# Patient Record
Sex: FEMALE | Race: WHITE | NOT HISPANIC OR LATINO | Employment: FULL TIME | URBAN - METROPOLITAN AREA
[De-identification: names, ages, dates, MRNs, and addresses within clinical notes are randomized per-mention and may not be internally consistent; named-entity substitution may affect disease eponyms.]

---

## 2023-03-08 LAB
ALANINE AMINOTRANSFERASE (SGPT) (U/L) IN SER/PLAS: 8 U/L (ref 7–45)
ALBUMIN (G/DL) IN SER/PLAS: 4.3 G/DL (ref 3.4–5)
ALKALINE PHOSPHATASE (U/L) IN SER/PLAS: 42 U/L (ref 33–110)
ANION GAP IN SER/PLAS: 10 MMOL/L (ref 10–20)
ASPARTATE AMINOTRANSFERASE (SGOT) (U/L) IN SER/PLAS: 13 U/L (ref 9–39)
BASOPHILS (10*3/UL) IN BLOOD BY AUTOMATED COUNT: 0.07 X10E9/L (ref 0–0.1)
BASOPHILS/100 LEUKOCYTES IN BLOOD BY AUTOMATED COUNT: 0.9 % (ref 0–2)
BILIRUBIN TOTAL (MG/DL) IN SER/PLAS: 0.3 MG/DL (ref 0–1.2)
CALCIUM (MG/DL) IN SER/PLAS: 9.4 MG/DL (ref 8.6–10.3)
CARBON DIOXIDE, TOTAL (MMOL/L) IN SER/PLAS: 29 MMOL/L (ref 21–32)
CHLORIDE (MMOL/L) IN SER/PLAS: 107 MMOL/L (ref 98–107)
CREATININE (MG/DL) IN SER/PLAS: 0.68 MG/DL (ref 0.5–1.05)
EOSINOPHILS (10*3/UL) IN BLOOD BY AUTOMATED COUNT: 0.34 X10E9/L (ref 0–0.7)
EOSINOPHILS/100 LEUKOCYTES IN BLOOD BY AUTOMATED COUNT: 4.4 % (ref 0–6)
ERYTHROCYTE DISTRIBUTION WIDTH (RATIO) BY AUTOMATED COUNT: 14.4 % (ref 11.5–14.5)
ERYTHROCYTE MEAN CORPUSCULAR HEMOGLOBIN CONCENTRATION (G/DL) BY AUTOMATED: 32.3 G/DL (ref 32–36)
ERYTHROCYTE MEAN CORPUSCULAR VOLUME (FL) BY AUTOMATED COUNT: 97 FL (ref 80–100)
ERYTHROCYTES (10*6/UL) IN BLOOD BY AUTOMATED COUNT: 4.24 X10E12/L (ref 4–5.2)
GFR FEMALE: >90 ML/MIN/1.73M2
GLUCOSE (MG/DL) IN SER/PLAS: 83 MG/DL (ref 74–99)
HEMATOCRIT (%) IN BLOOD BY AUTOMATED COUNT: 41.2 % (ref 36–46)
HEMOGLOBIN (G/DL) IN BLOOD: 13.3 G/DL (ref 12–16)
IMMATURE GRANULOCYTES/100 LEUKOCYTES IN BLOOD BY AUTOMATED COUNT: 0.3 % (ref 0–0.9)
LACTATE DEHYDROGENASE (U/L) IN SER/PLAS BY LAC->PYR RXN: 169 U/L (ref 84–246)
LEUKOCYTES (10*3/UL) IN BLOOD BY AUTOMATED COUNT: 7.7 X10E9/L (ref 4.4–11.3)
LYMPHOCYTES (10*3/UL) IN BLOOD BY AUTOMATED COUNT: 1.81 X10E9/L (ref 1.2–4.8)
LYMPHOCYTES/100 LEUKOCYTES IN BLOOD BY AUTOMATED COUNT: 23.4 % (ref 13–44)
MONOCYTES (10*3/UL) IN BLOOD BY AUTOMATED COUNT: 0.63 X10E9/L (ref 0.1–1)
MONOCYTES/100 LEUKOCYTES IN BLOOD BY AUTOMATED COUNT: 8.2 % (ref 2–10)
NEUTROPHILS (10*3/UL) IN BLOOD BY AUTOMATED COUNT: 4.86 X10E9/L (ref 1.2–7.7)
NEUTROPHILS/100 LEUKOCYTES IN BLOOD BY AUTOMATED COUNT: 62.8 % (ref 40–80)
PLATELETS (10*3/UL) IN BLOOD AUTOMATED COUNT: 222 X10E9/L (ref 150–450)
POTASSIUM (MMOL/L) IN SER/PLAS: 4.6 MMOL/L (ref 3.5–5.3)
PROTEIN TOTAL: 6.6 G/DL (ref 6.4–8.2)
SODIUM (MMOL/L) IN SER/PLAS: 141 MMOL/L (ref 136–145)
UREA NITROGEN (MG/DL) IN SER/PLAS: 11 MG/DL (ref 6–23)

## 2023-03-15 LAB
CHLAMYDIA TRACH., AMPLIFIED: NEGATIVE
CLUE CELLS: PRESENT
N. GONORRHEA, AMPLIFIED: NEGATIVE
NUGENT SCORE: 10
TRICHOMONAS VAGINALIS: NEGATIVE
YEAST: ABNORMAL

## 2023-03-28 LAB — FOLLITROPIN (IU/L) IN SER/PLAS: 47.8 IU/L

## 2023-03-29 DIAGNOSIS — E53.8 VITAMIN B12 DEFICIENCY: ICD-10-CM

## 2023-03-29 RX ORDER — ALBUTEROL SULFATE 90 UG/1
2 AEROSOL, METERED RESPIRATORY (INHALATION)
COMMUNITY
Start: 2019-07-15 | End: 2023-05-19 | Stop reason: SDUPTHER

## 2023-03-29 RX ORDER — LEVETIRACETAM 500 MG/1
500 TABLET, FILM COATED ORAL 2 TIMES DAILY
COMMUNITY
Start: 2022-08-29 | End: 2024-06-03 | Stop reason: ALTCHOICE

## 2023-03-29 RX ORDER — ACETAMINOPHEN 500 MG
1 TABLET ORAL DAILY
COMMUNITY
Start: 2022-02-23

## 2023-03-29 RX ORDER — MINERAL OIL
1 ENEMA (ML) RECTAL DAILY PRN
COMMUNITY

## 2023-03-29 RX ORDER — SYRINGE WITH NEEDLE, 1 ML 25GX5/8"
SYRINGE, EMPTY DISPOSABLE MISCELLANEOUS
COMMUNITY
Start: 2023-02-18 | End: 2023-03-29 | Stop reason: SDUPTHER

## 2023-03-29 RX ORDER — MOMETASONE FUROATE 50 UG/1
1 SPRAY, METERED NASAL DAILY
COMMUNITY

## 2023-03-29 RX ORDER — COPPER 313.4 MG/1
INTRAUTERINE DEVICE INTRAUTERINE
COMMUNITY

## 2023-03-29 RX ORDER — ACETAMINOPHEN 500 MG
500 TABLET ORAL
COMMUNITY
Start: 2021-04-07

## 2023-03-29 RX ORDER — CYANOCOBALAMIN 1000 UG/ML
1000 INJECTION, SOLUTION INTRAMUSCULAR; SUBCUTANEOUS 2 TIMES WEEKLY
COMMUNITY
Start: 2019-05-30 | End: 2024-04-01 | Stop reason: SDUPTHER

## 2023-03-29 RX ORDER — CHOLECALCIFEROL (VITAMIN D3) 25 MCG
TABLET ORAL
COMMUNITY
Start: 2020-10-27 | End: 2023-05-18 | Stop reason: ALTCHOICE

## 2023-03-29 RX ORDER — SYRINGE WITH NEEDLE, 1 ML 25GX5/8"
SYRINGE, EMPTY DISPOSABLE MISCELLANEOUS
Qty: 8 EACH | Refills: 11 | Status: SHIPPED | OUTPATIENT
Start: 2023-03-29 | End: 2024-04-01 | Stop reason: SDUPTHER

## 2023-05-18 PROBLEM — R05.9 COUGH: Status: ACTIVE | Noted: 2023-05-18

## 2023-05-18 PROBLEM — D49.6 BRAIN TUMOR (MULTI): Status: ACTIVE | Noted: 2023-05-18

## 2023-05-18 PROBLEM — N93.0 BLEEDING AFTER INTERCOURSE: Status: ACTIVE | Noted: 2023-05-18

## 2023-05-18 PROBLEM — F41.9 ANXIETY: Status: ACTIVE | Noted: 2023-05-18

## 2023-05-18 PROBLEM — N83.291 OTHER OVARIAN CYST, RIGHT SIDE: Status: ACTIVE | Noted: 2022-12-13

## 2023-05-18 PROBLEM — E89.0 STATUS POST PARTIAL THYROIDECTOMY: Status: ACTIVE | Noted: 2023-05-18

## 2023-05-18 PROBLEM — N94.6 DYSMENORRHEA: Status: ACTIVE | Noted: 2023-05-18

## 2023-05-18 PROBLEM — R51.9 NEW ONSET HEADACHE: Status: ACTIVE | Noted: 2023-05-18

## 2023-05-18 PROBLEM — N91.2 AMENORRHEA: Status: ACTIVE | Noted: 2023-05-18

## 2023-05-18 PROBLEM — R63.4 WEIGHT LOSS: Status: ACTIVE | Noted: 2023-05-18

## 2023-05-18 PROBLEM — G43.909 MIGRAINES: Status: ACTIVE | Noted: 2023-05-18

## 2023-05-18 PROBLEM — Q62.5 DUPLICATED URETER, RIGHT: Status: ACTIVE | Noted: 2023-05-18

## 2023-05-18 PROBLEM — R06.89 DECREASED BREATH SOUNDS: Status: ACTIVE | Noted: 2023-05-18

## 2023-05-18 PROBLEM — Q14.1 CONGENITAL HYPERTROPHY OF RETINAL PIGMENT EPITHELIUM: Status: ACTIVE | Noted: 2023-05-18

## 2023-05-18 PROBLEM — N85.2 ENLARGED UTERUS: Status: ACTIVE | Noted: 2023-05-18

## 2023-05-18 PROBLEM — B96.89 BACTERIAL VAGINOSIS: Status: ACTIVE | Noted: 2023-05-18

## 2023-05-18 PROBLEM — N64.89 ACQUIRED BREAST DEFORMITY: Status: ACTIVE | Noted: 2023-05-18

## 2023-05-18 PROBLEM — E04.1 RIGHT THYROID NODULE: Status: ACTIVE | Noted: 2023-05-18

## 2023-05-18 PROBLEM — E66.9 OBESITY (BMI 30.0-34.9): Status: ACTIVE | Noted: 2023-05-18

## 2023-05-18 PROBLEM — R06.00 DYSPNEA: Status: ACTIVE | Noted: 2023-05-18

## 2023-05-18 PROBLEM — R30.0 BURNING WITH URINATION: Status: ACTIVE | Noted: 2023-05-18

## 2023-05-18 PROBLEM — N97.0 ANOVULATORY (DYSFUNCTIONAL UTERINE) BLEEDING: Status: ACTIVE | Noted: 2023-05-18

## 2023-05-18 PROBLEM — Z85.820 PERSONAL HISTORY OF MALIGNANT MELANOMA OF SKIN: Status: ACTIVE | Noted: 2022-09-26

## 2023-05-18 PROBLEM — R33.9 INCOMPLETE EMPTYING OF BLADDER: Status: ACTIVE | Noted: 2023-05-18

## 2023-05-18 PROBLEM — N89.8 VAGINAL DISCHARGE: Status: ACTIVE | Noted: 2023-05-18

## 2023-05-18 PROBLEM — E66.3 OVERWEIGHT: Status: ACTIVE | Noted: 2023-05-18

## 2023-05-18 PROBLEM — C79.31: Status: ACTIVE | Noted: 2022-12-13

## 2023-05-18 PROBLEM — Z86.39 PERSONAL HISTORY OF OTHER ENDOCRINE, NUTRITIONAL AND METABOLIC DISEASE: Status: ACTIVE | Noted: 2022-12-20

## 2023-05-18 PROBLEM — N76.0 BACTERIAL VAGINOSIS: Status: ACTIVE | Noted: 2023-05-18

## 2023-05-18 PROBLEM — R82.89 ABNORMAL URINE CYTOLOGY: Status: ACTIVE | Noted: 2023-05-18

## 2023-05-18 PROBLEM — R10.9 RIGHT FLANK PAIN: Status: ACTIVE | Noted: 2023-05-18

## 2023-05-18 PROBLEM — Z90.49 ACQUIRED ABSENCE OF OTHER SPECIFIED PARTS OF DIGESTIVE TRACT: Status: ACTIVE | Noted: 2022-12-13

## 2023-05-18 PROBLEM — H53.9 VISION DISTURBANCE: Status: ACTIVE | Noted: 2023-05-18

## 2023-05-18 PROBLEM — R11.0 NAUSEA IN ADULT: Status: ACTIVE | Noted: 2023-05-18

## 2023-05-18 PROBLEM — R59.0 LOCALIZED ENLARGED LYMPH NODES: Status: ACTIVE | Noted: 2022-09-26

## 2023-05-18 PROBLEM — R73.09 ELEVATED GLUCOSE: Status: ACTIVE | Noted: 2023-05-18

## 2023-05-18 PROBLEM — I95.9 HYPOTENSION: Status: ACTIVE | Noted: 2023-05-18

## 2023-05-18 PROBLEM — R63.5 WEIGHT GAIN: Status: ACTIVE | Noted: 2023-05-18

## 2023-05-18 PROBLEM — J45.901 ASTHMA WITH ACUTE EXACERBATION (HHS-HCC): Status: ACTIVE | Noted: 2023-05-18

## 2023-05-18 PROBLEM — L03.90 CELLULITIS: Status: ACTIVE | Noted: 2023-05-18

## 2023-05-18 PROBLEM — J30.9 ALLERGIC RHINITIS: Status: ACTIVE | Noted: 2023-05-18

## 2023-05-18 PROBLEM — R42 DIZZINESS: Status: ACTIVE | Noted: 2023-05-18

## 2023-05-18 PROBLEM — I89.0 LYMPHEDEMA: Status: ACTIVE | Noted: 2023-05-18

## 2023-05-18 PROBLEM — R56.9 UNSPECIFIED CONVULSIONS (MULTI): Status: ACTIVE | Noted: 2022-12-20

## 2023-05-18 PROBLEM — E55.9 VITAMIN D DEFICIENCY: Status: ACTIVE | Noted: 2023-05-18

## 2023-05-18 PROBLEM — E27.8 OTHER SPECIFIED DISORDERS OF ADRENAL GLAND (MULTI): Status: ACTIVE | Noted: 2022-12-13

## 2023-05-18 PROBLEM — Z97.5 CONTRACEPTION, DEVICE INTRAUTERINE: Status: ACTIVE | Noted: 2023-05-18

## 2023-05-18 PROBLEM — E66.811 OBESITY (BMI 30.0-34.9): Status: ACTIVE | Noted: 2023-05-18

## 2023-05-19 ENCOUNTER — OFFICE VISIT (OUTPATIENT)
Dept: PRIMARY CARE | Facility: CLINIC | Age: 43
End: 2023-05-19
Payer: COMMERCIAL

## 2023-05-19 VITALS
TEMPERATURE: 98 F | SYSTOLIC BLOOD PRESSURE: 104 MMHG | DIASTOLIC BLOOD PRESSURE: 72 MMHG | HEIGHT: 68 IN | OXYGEN SATURATION: 98 % | WEIGHT: 191 LBS | BODY MASS INDEX: 28.95 KG/M2 | HEART RATE: 66 BPM

## 2023-05-19 DIAGNOSIS — D49.6 BRAIN TUMOR (MULTI): ICD-10-CM

## 2023-05-19 DIAGNOSIS — Z12.31 SCREENING MAMMOGRAM, ENCOUNTER FOR: ICD-10-CM

## 2023-05-19 DIAGNOSIS — J45.20 MILD INTERMITTENT ASTHMA, UNSPECIFIED WHETHER COMPLICATED (HHS-HCC): ICD-10-CM

## 2023-05-19 DIAGNOSIS — C77.9 MALIGNANT MELANOMA METASTATIC TO LYMPH NODE (MULTI): ICD-10-CM

## 2023-05-19 DIAGNOSIS — R56.9 SEIZURE (MULTI): ICD-10-CM

## 2023-05-19 DIAGNOSIS — C79.31 MALIGNANT MELANOMA METASTATIC TO BRAIN (MULTI): ICD-10-CM

## 2023-05-19 DIAGNOSIS — J30.9 ALLERGIC RHINITIS, UNSPECIFIED SEASONALITY, UNSPECIFIED TRIGGER: ICD-10-CM

## 2023-05-19 DIAGNOSIS — G47.9 SLEEP DIFFICULTIES: ICD-10-CM

## 2023-05-19 DIAGNOSIS — Z00.00 WELL ADULT EXAM: Primary | ICD-10-CM

## 2023-05-19 PROCEDURE — 1036F TOBACCO NON-USER: CPT | Performed by: INTERNAL MEDICINE

## 2023-05-19 PROCEDURE — 99396 PREV VISIT EST AGE 40-64: CPT | Performed by: INTERNAL MEDICINE

## 2023-05-19 PROCEDURE — 99214 OFFICE O/P EST MOD 30 MIN: CPT | Performed by: INTERNAL MEDICINE

## 2023-05-19 PROCEDURE — 93000 ELECTROCARDIOGRAM COMPLETE: CPT | Performed by: INTERNAL MEDICINE

## 2023-05-19 RX ORDER — ALBUTEROL SULFATE 90 UG/1
2 AEROSOL, METERED RESPIRATORY (INHALATION)
Qty: 18 G | Refills: 3 | Status: SHIPPED | OUTPATIENT
Start: 2023-05-19

## 2023-05-19 RX ORDER — TRAZODONE HYDROCHLORIDE 50 MG/1
TABLET ORAL
Qty: 30 TABLET | Refills: 1 | Status: SHIPPED | OUTPATIENT
Start: 2023-05-19 | End: 2023-11-06 | Stop reason: WASHOUT

## 2023-05-19 RX ORDER — ESCITALOPRAM OXALATE 10 MG/1
TABLET ORAL
Qty: 15 TABLET | Refills: 5 | Status: SHIPPED | OUTPATIENT
Start: 2023-05-19 | End: 2023-11-06 | Stop reason: WASHOUT

## 2023-05-19 ASSESSMENT — PATIENT HEALTH QUESTIONNAIRE - PHQ9
1. LITTLE INTEREST OR PLEASURE IN DOING THINGS: NOT AT ALL
SUM OF ALL RESPONSES TO PHQ9 QUESTIONS 1 AND 2: 0
2. FEELING DOWN, DEPRESSED OR HOPELESS: NOT AT ALL

## 2023-05-19 NOTE — PROGRESS NOTES
"Subjective       Current Issues:  Current concerns include finished immunotx tx  Feels well  Has son and dtr   Dtr in hs  Menses last in jan   .saw dr gimenez  Sexually active  Iud for 5 years  Will see new gyn  Had miscarriage then got iud  No cp no sob  Really does not feels she ever had a sz  Wants off med  Will discuss w neuro  Sleep difficulties    Paraguard  No coffee after 9 am       Sleep: poor night  No bowel or bladder issues  No cp or sob or depression    Review of Nutrition:  Current diet:   Exercise discussed    Gen:  no fever  HEENT:  no trouble swallowing  CV:  no dyspnea, cyanosis  Lungs:  no shortness of breath  GI:  no constipation, no blood in stool  Vascular:  no edema  Neuro:   no weakness  Skin:  no rash  MS:no joint swelling  Gu:  no urinary complaints  All other systems have been reviewed and are negative for complaint      Screening Questions:  Objective   /72   Pulse 66   Temp 36.7 °C (98 °F) (Temporal)   Ht 1.727 m (5' 8\")   Wt 86.6 kg (191 lb)   SpO2 98%   BMI 29.04 kg/m²       General:   alert and oriented, in no acute distress   Gait:   normal   Skin:   normal   Oral cavity:   lips, mucosa, and tongue normal; teeth and gums normal   Eyes:   sclerae white, pupils equal and reactive   Ears:   normal bilaterally Tms grey   Neck:   no adenopathy and thyroid not enlarged, symmetric, no tenderness/mass/nodules   Lungs:  clear to auscultation bilaterally   Heart:   regular rate and rhythm, S1, S2 normal, no murmur, click, rub or gallop   Abdomen:  soft, non-tender; bowel sounds normal; no masses, no organomegaly   : Not examined       Extremities:  extremities normal, warm and well-perfused; no cyanosis, clubbing, or edema,   Neuro:  normal without focal findings and muscle tone and strength normal and symmetric       Problem List Items Addressed This Visit          Nervous    Brain tumor (CMS/HCC)    Relevant Medications    escitalopram (Lexapro) 10 mg tablet    Other Relevant " Orders    FSH    Luteinizing hormone    Comprehensive Metabolic Panel    Lipid Panel    TSH with reflex to Free T4 if abnormal    Vitamin D, Total    CBC and Auto Differential    Malignant melanoma metastatic to brain (CMS/HCC)    Relevant Medications    escitalopram (Lexapro) 10 mg tablet    Other Relevant Orders    FSH    Luteinizing hormone    Comprehensive Metabolic Panel    Lipid Panel    TSH with reflex to Free T4 if abnormal    Vitamin D, Total    CBC and Auto Differential       Respiratory    Mild asthma    Relevant Medications    albuterol 90 mcg/actuation inhaler       Other    Allergic rhinitis    Relevant Medications    escitalopram (Lexapro) 10 mg tablet    Other Relevant Orders    FSH    Luteinizing hormone    Comprehensive Metabolic Panel    Lipid Panel    TSH with reflex to Free T4 if abnormal    Vitamin D, Total    CBC and Auto Differential    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)    Relevant Medications    escitalopram (Lexapro) 10 mg tablet    Other Relevant Orders    FSH    Luteinizing hormone    Comprehensive Metabolic Panel    Lipid Panel    TSH with reflex to Free T4 if abnormal    Vitamin D, Total    CBC and Auto Differential     Other Visit Diagnoses       Well adult exam    -  Primary    Relevant Medications    escitalopram (Lexapro) 10 mg tablet    Other Relevant Orders    FSH    Luteinizing hormone    Comprehensive Metabolic Panel    Lipid Panel    TSH with reflex to Free T4 if abnormal    Vitamin D, Total    CBC and Auto Differential    Seizure (CMS/HCC)        pt does not think she ever had a sz  had eeg  per neuro    Relevant Medications    escitalopram (Lexapro) 10 mg tablet    Other Relevant Orders    FSH    Luteinizing hormone    Comprehensive Metabolic Panel    Lipid Panel    TSH with reflex to Free T4 if abnormal    Vitamin D, Total    CBC and Auto Differential    Screening mammogram, encounter for        Relevant Orders    BI mammo bilateral screening  tomosynthesis    Sleep difficulties        Relevant Medications    traZODone (Desyrel) 50 mg tablet    Other Relevant Orders    ECG 12 lead        Referral to gyn for ? Cont iud/exam       Chronic conditions reviewed in the assessment and plan.    Continue medications unless specified otherwise.  Previous labs reviewed.   Other specialty provider notes reviewed.

## 2023-05-27 ENCOUNTER — LAB (OUTPATIENT)
Dept: LAB | Facility: LAB | Age: 43
End: 2023-05-27
Payer: COMMERCIAL

## 2023-05-27 DIAGNOSIS — Z00.00 WELL ADULT EXAM: ICD-10-CM

## 2023-05-27 DIAGNOSIS — R56.9 SEIZURE (MULTI): ICD-10-CM

## 2023-05-27 DIAGNOSIS — J30.9 ALLERGIC RHINITIS, UNSPECIFIED SEASONALITY, UNSPECIFIED TRIGGER: ICD-10-CM

## 2023-05-27 DIAGNOSIS — C77.9 MALIGNANT MELANOMA METASTATIC TO LYMPH NODE (MULTI): ICD-10-CM

## 2023-05-27 DIAGNOSIS — C79.31 MALIGNANT MELANOMA METASTATIC TO BRAIN (MULTI): ICD-10-CM

## 2023-05-27 DIAGNOSIS — D49.6 BRAIN TUMOR (MULTI): ICD-10-CM

## 2023-05-27 LAB
ALANINE AMINOTRANSFERASE (SGPT) (U/L) IN SER/PLAS: 9 U/L (ref 7–45)
ALBUMIN (G/DL) IN SER/PLAS: 4.7 G/DL (ref 3.4–5)
ALKALINE PHOSPHATASE (U/L) IN SER/PLAS: 46 U/L (ref 33–110)
ANION GAP IN SER/PLAS: 10 MMOL/L (ref 10–20)
ASPARTATE AMINOTRANSFERASE (SGOT) (U/L) IN SER/PLAS: 12 U/L (ref 9–39)
BASOPHILS (10*3/UL) IN BLOOD BY AUTOMATED COUNT: 0.09 X10E9/L (ref 0–0.1)
BASOPHILS/100 LEUKOCYTES IN BLOOD BY AUTOMATED COUNT: 1.3 % (ref 0–2)
BILIRUBIN TOTAL (MG/DL) IN SER/PLAS: 0.4 MG/DL (ref 0–1.2)
CALCIDIOL (25 OH VITAMIN D3) (NG/ML) IN SER/PLAS: 42 NG/ML
CALCIUM (MG/DL) IN SER/PLAS: 9.7 MG/DL (ref 8.6–10.3)
CARBON DIOXIDE, TOTAL (MMOL/L) IN SER/PLAS: 29 MMOL/L (ref 21–32)
CHLORIDE (MMOL/L) IN SER/PLAS: 107 MMOL/L (ref 98–107)
CHOLESTEROL (MG/DL) IN SER/PLAS: 202 MG/DL (ref 0–199)
CHOLESTEROL IN HDL (MG/DL) IN SER/PLAS: 53.5 MG/DL
CHOLESTEROL/HDL RATIO: 3.8
CREATININE (MG/DL) IN SER/PLAS: 0.74 MG/DL (ref 0.5–1.05)
EOSINOPHILS (10*3/UL) IN BLOOD BY AUTOMATED COUNT: 0.59 X10E9/L (ref 0–0.7)
EOSINOPHILS/100 LEUKOCYTES IN BLOOD BY AUTOMATED COUNT: 8.7 % (ref 0–6)
ERYTHROCYTE DISTRIBUTION WIDTH (RATIO) BY AUTOMATED COUNT: 13.5 % (ref 11.5–14.5)
ERYTHROCYTE MEAN CORPUSCULAR HEMOGLOBIN CONCENTRATION (G/DL) BY AUTOMATED: 33.1 G/DL (ref 32–36)
ERYTHROCYTE MEAN CORPUSCULAR VOLUME (FL) BY AUTOMATED COUNT: 97 FL (ref 80–100)
ERYTHROCYTES (10*6/UL) IN BLOOD BY AUTOMATED COUNT: 4.47 X10E12/L (ref 4–5.2)
GFR FEMALE: >90 ML/MIN/1.73M2
GLUCOSE (MG/DL) IN SER/PLAS: 89 MG/DL (ref 74–99)
HEMATOCRIT (%) IN BLOOD BY AUTOMATED COUNT: 43.2 % (ref 36–46)
HEMOGLOBIN (G/DL) IN BLOOD: 14.3 G/DL (ref 12–16)
IMMATURE GRANULOCYTES/100 LEUKOCYTES IN BLOOD BY AUTOMATED COUNT: 0.3 % (ref 0–0.9)
LDL: 135 MG/DL (ref 0–99)
LEUKOCYTES (10*3/UL) IN BLOOD BY AUTOMATED COUNT: 6.8 X10E9/L (ref 4.4–11.3)
LYMPHOCYTES (10*3/UL) IN BLOOD BY AUTOMATED COUNT: 2.27 X10E9/L (ref 1.2–4.8)
LYMPHOCYTES/100 LEUKOCYTES IN BLOOD BY AUTOMATED COUNT: 33.6 % (ref 13–44)
MONOCYTES (10*3/UL) IN BLOOD BY AUTOMATED COUNT: 0.47 X10E9/L (ref 0.1–1)
MONOCYTES/100 LEUKOCYTES IN BLOOD BY AUTOMATED COUNT: 7 % (ref 2–10)
NEUTROPHILS (10*3/UL) IN BLOOD BY AUTOMATED COUNT: 3.31 X10E9/L (ref 1.2–7.7)
NEUTROPHILS/100 LEUKOCYTES IN BLOOD BY AUTOMATED COUNT: 49.1 % (ref 40–80)
PLATELETS (10*3/UL) IN BLOOD AUTOMATED COUNT: 226 X10E9/L (ref 150–450)
POTASSIUM (MMOL/L) IN SER/PLAS: 4.2 MMOL/L (ref 3.5–5.3)
PROTEIN TOTAL: 7.1 G/DL (ref 6.4–8.2)
SODIUM (MMOL/L) IN SER/PLAS: 142 MMOL/L (ref 136–145)
THYROTROPIN (MIU/L) IN SER/PLAS BY DETECTION LIMIT <= 0.05 MIU/L: 2.91 MIU/L (ref 0.44–3.98)
TRIGLYCERIDE (MG/DL) IN SER/PLAS: 69 MG/DL (ref 0–149)
UREA NITROGEN (MG/DL) IN SER/PLAS: 9 MG/DL (ref 6–23)
VLDL: 14 MG/DL (ref 0–40)

## 2023-05-27 PROCEDURE — 36415 COLL VENOUS BLD VENIPUNCTURE: CPT

## 2023-05-27 PROCEDURE — 84443 ASSAY THYROID STIM HORMONE: CPT

## 2023-05-27 PROCEDURE — 83002 ASSAY OF GONADOTROPIN (LH): CPT

## 2023-05-27 PROCEDURE — 82306 VITAMIN D 25 HYDROXY: CPT

## 2023-05-27 PROCEDURE — 83001 ASSAY OF GONADOTROPIN (FSH): CPT

## 2023-05-27 PROCEDURE — 85025 COMPLETE CBC W/AUTO DIFF WBC: CPT

## 2023-05-27 PROCEDURE — 80061 LIPID PANEL: CPT

## 2023-05-27 PROCEDURE — 80053 COMPREHEN METABOLIC PANEL: CPT

## 2023-05-28 LAB
FOLLITROPIN (IU/L) IN SER/PLAS: 83.4 IU/L
LUTEINIZING HORMONE (IU/ML) IN SER/PLAS: 61.5 IU/L

## 2023-06-19 ENCOUNTER — APPOINTMENT (OUTPATIENT)
Dept: PRIMARY CARE | Facility: CLINIC | Age: 43
End: 2023-06-19
Payer: COMMERCIAL

## 2023-07-19 LAB
ALANINE AMINOTRANSFERASE (SGPT) (U/L) IN SER/PLAS: 9 U/L (ref 7–45)
ALBUMIN (G/DL) IN SER/PLAS: 4 G/DL (ref 3.4–5)
ALKALINE PHOSPHATASE (U/L) IN SER/PLAS: 36 U/L (ref 33–110)
ANION GAP IN SER/PLAS: 9 MMOL/L (ref 10–20)
ASPARTATE AMINOTRANSFERASE (SGOT) (U/L) IN SER/PLAS: 14 U/L (ref 9–39)
BASOPHILS (10*3/UL) IN BLOOD BY AUTOMATED COUNT: 0.08 X10E9/L (ref 0–0.1)
BASOPHILS/100 LEUKOCYTES IN BLOOD BY AUTOMATED COUNT: 1.1 % (ref 0–2)
BILIRUBIN TOTAL (MG/DL) IN SER/PLAS: 0.6 MG/DL (ref 0–1.2)
CALCIUM (MG/DL) IN SER/PLAS: 9 MG/DL (ref 8.6–10.3)
CARBON DIOXIDE, TOTAL (MMOL/L) IN SER/PLAS: 27 MMOL/L (ref 21–32)
CHLORIDE (MMOL/L) IN SER/PLAS: 105 MMOL/L (ref 98–107)
CREATININE (MG/DL) IN SER/PLAS: 0.67 MG/DL (ref 0.5–1.05)
EOSINOPHILS (10*3/UL) IN BLOOD BY AUTOMATED COUNT: 0.4 X10E9/L (ref 0–0.7)
EOSINOPHILS/100 LEUKOCYTES IN BLOOD BY AUTOMATED COUNT: 5.5 % (ref 0–6)
ERYTHROCYTE DISTRIBUTION WIDTH (RATIO) BY AUTOMATED COUNT: 13.5 % (ref 11.5–14.5)
ERYTHROCYTE MEAN CORPUSCULAR HEMOGLOBIN CONCENTRATION (G/DL) BY AUTOMATED: 33.2 G/DL (ref 32–36)
ERYTHROCYTE MEAN CORPUSCULAR VOLUME (FL) BY AUTOMATED COUNT: 98 FL (ref 80–100)
ERYTHROCYTES (10*6/UL) IN BLOOD BY AUTOMATED COUNT: 4.02 X10E12/L (ref 4–5.2)
GFR FEMALE: >90 ML/MIN/1.73M2
GLUCOSE (MG/DL) IN SER/PLAS: 77 MG/DL (ref 74–99)
HEMATOCRIT (%) IN BLOOD BY AUTOMATED COUNT: 39.2 % (ref 36–46)
HEMOGLOBIN (G/DL) IN BLOOD: 13 G/DL (ref 12–16)
IMMATURE GRANULOCYTES/100 LEUKOCYTES IN BLOOD BY AUTOMATED COUNT: 0.4 % (ref 0–0.9)
LACTATE DEHYDROGENASE (U/L) IN SER/PLAS BY LAC->PYR RXN: 122 U/L (ref 84–246)
LEUKOCYTES (10*3/UL) IN BLOOD BY AUTOMATED COUNT: 7.3 X10E9/L (ref 4.4–11.3)
LYMPHOCYTES (10*3/UL) IN BLOOD BY AUTOMATED COUNT: 2.11 X10E9/L (ref 1.2–4.8)
LYMPHOCYTES/100 LEUKOCYTES IN BLOOD BY AUTOMATED COUNT: 29 % (ref 13–44)
MONOCYTES (10*3/UL) IN BLOOD BY AUTOMATED COUNT: 0.61 X10E9/L (ref 0.1–1)
MONOCYTES/100 LEUKOCYTES IN BLOOD BY AUTOMATED COUNT: 8.4 % (ref 2–10)
NEUTROPHILS (10*3/UL) IN BLOOD BY AUTOMATED COUNT: 4.05 X10E9/L (ref 1.2–7.7)
NEUTROPHILS/100 LEUKOCYTES IN BLOOD BY AUTOMATED COUNT: 55.6 % (ref 40–80)
PLATELETS (10*3/UL) IN BLOOD AUTOMATED COUNT: 189 X10E9/L (ref 150–450)
POTASSIUM (MMOL/L) IN SER/PLAS: 3.9 MMOL/L (ref 3.5–5.3)
PROTEIN TOTAL: 6.3 G/DL (ref 6.4–8.2)
SODIUM (MMOL/L) IN SER/PLAS: 137 MMOL/L (ref 136–145)
UREA NITROGEN (MG/DL) IN SER/PLAS: 10 MG/DL (ref 6–23)

## 2023-11-02 ENCOUNTER — LAB (OUTPATIENT)
Dept: LAB | Facility: LAB | Age: 43
End: 2023-11-02
Payer: COMMERCIAL

## 2023-11-02 ENCOUNTER — ANCILLARY PROCEDURE (OUTPATIENT)
Dept: RADIOLOGY | Facility: CLINIC | Age: 43
End: 2023-11-02
Payer: COMMERCIAL

## 2023-11-02 VITALS — BODY MASS INDEX: 28.34 KG/M2 | WEIGHT: 187 LBS | HEIGHT: 68 IN

## 2023-11-02 DIAGNOSIS — Z86.39 PERSONAL HISTORY OF OTHER ENDOCRINE, NUTRITIONAL AND METABOLIC DISEASE: ICD-10-CM

## 2023-11-02 DIAGNOSIS — C79.31 SECONDARY MALIGNANT NEOPLASM OF BRAIN (MULTI): ICD-10-CM

## 2023-11-02 DIAGNOSIS — C43.9 MALIGNANT MELANOMA OF SKIN, UNSPECIFIED (MULTI): Primary | ICD-10-CM

## 2023-11-02 DIAGNOSIS — E04.1 NONTOXIC SINGLE THYROID NODULE: ICD-10-CM

## 2023-11-02 LAB
ALBUMIN SERPL BCP-MCNC: 4.6 G/DL (ref 3.4–5)
ALP SERPL-CCNC: 36 U/L (ref 33–110)
ALT SERPL W P-5'-P-CCNC: 9 U/L (ref 7–45)
ANION GAP SERPL CALC-SCNC: 14 MMOL/L (ref 10–20)
AST SERPL W P-5'-P-CCNC: 14 U/L (ref 9–39)
BASOPHILS # BLD AUTO: 0.11 X10*3/UL (ref 0–0.1)
BASOPHILS NFR BLD AUTO: 1.4 %
BILIRUB SERPL-MCNC: 0.7 MG/DL (ref 0–1.2)
BUN SERPL-MCNC: 8 MG/DL (ref 6–23)
CALCIUM SERPL-MCNC: 9.4 MG/DL (ref 8.6–10.3)
CHLORIDE SERPL-SCNC: 102 MMOL/L (ref 98–107)
CO2 SERPL-SCNC: 29 MMOL/L (ref 21–32)
CREAT SERPL-MCNC: 0.68 MG/DL (ref 0.5–1.05)
EOSINOPHIL # BLD AUTO: 0.6 X10*3/UL (ref 0–0.7)
EOSINOPHIL NFR BLD AUTO: 7.7 %
ERYTHROCYTE [DISTWIDTH] IN BLOOD BY AUTOMATED COUNT: 13.1 % (ref 11.5–14.5)
GFR SERPL CREATININE-BSD FRML MDRD: >90 ML/MIN/1.73M*2
GLUCOSE SERPL-MCNC: 80 MG/DL (ref 74–99)
HCT VFR BLD AUTO: 40 % (ref 36–46)
HGB BLD-MCNC: 13.3 G/DL (ref 12–16)
IMM GRANULOCYTES # BLD AUTO: 0.03 X10*3/UL (ref 0–0.7)
IMM GRANULOCYTES NFR BLD AUTO: 0.4 % (ref 0–0.9)
LDH SERPL L TO P-CCNC: 130 U/L (ref 84–246)
LYMPHOCYTES # BLD AUTO: 2.09 X10*3/UL (ref 1.2–4.8)
LYMPHOCYTES NFR BLD AUTO: 26.9 %
MCH RBC QN AUTO: 32.5 PG (ref 26–34)
MCHC RBC AUTO-ENTMCNC: 33.3 G/DL (ref 32–36)
MCV RBC AUTO: 98 FL (ref 80–100)
MONOCYTES # BLD AUTO: 0.62 X10*3/UL (ref 0.1–1)
MONOCYTES NFR BLD AUTO: 8 %
NEUTROPHILS # BLD AUTO: 4.31 X10*3/UL (ref 1.2–7.7)
NEUTROPHILS NFR BLD AUTO: 55.6 %
NRBC BLD-RTO: 0 /100 WBCS (ref 0–0)
PLATELET # BLD AUTO: 220 X10*3/UL (ref 150–450)
POTASSIUM SERPL-SCNC: 3.7 MMOL/L (ref 3.5–5.3)
PROT SERPL-MCNC: 7.1 G/DL (ref 6.4–8.2)
RBC # BLD AUTO: 4.09 X10*6/UL (ref 4–5.2)
SODIUM SERPL-SCNC: 141 MMOL/L (ref 136–145)
TSH SERPL-ACNC: 2.47 MIU/L (ref 0.44–3.98)
WBC # BLD AUTO: 7.8 X10*3/UL (ref 4.4–11.3)

## 2023-11-02 PROCEDURE — 74177 CT ABD & PELVIS W/CONTRAST: CPT

## 2023-11-02 PROCEDURE — 71260 CT THORAX DX C+: CPT | Performed by: STUDENT IN AN ORGANIZED HEALTH CARE EDUCATION/TRAINING PROGRAM

## 2023-11-02 PROCEDURE — A9575 INJ GADOTERATE MEGLUMI 0.1ML: HCPCS | Performed by: INTERNAL MEDICINE

## 2023-11-02 PROCEDURE — 36415 COLL VENOUS BLD VENIPUNCTURE: CPT

## 2023-11-02 PROCEDURE — 2550000001 HC RX 255 CONTRASTS: Performed by: INTERNAL MEDICINE

## 2023-11-02 PROCEDURE — 84443 ASSAY THYROID STIM HORMONE: CPT

## 2023-11-02 PROCEDURE — 74177 CT ABD & PELVIS W/CONTRAST: CPT | Performed by: STUDENT IN AN ORGANIZED HEALTH CARE EDUCATION/TRAINING PROGRAM

## 2023-11-02 PROCEDURE — 83615 LACTATE (LD) (LDH) ENZYME: CPT

## 2023-11-02 PROCEDURE — 70553 MRI BRAIN STEM W/O & W/DYE: CPT | Performed by: RADIOLOGY

## 2023-11-02 PROCEDURE — 85025 COMPLETE CBC W/AUTO DIFF WBC: CPT

## 2023-11-02 PROCEDURE — 70553 MRI BRAIN STEM W/O & W/DYE: CPT

## 2023-11-02 PROCEDURE — 80053 COMPREHEN METABOLIC PANEL: CPT

## 2023-11-02 RX ORDER — GADOTERATE MEGLUMINE 376.9 MG/ML
18 INJECTION INTRAVENOUS
Status: COMPLETED | OUTPATIENT
Start: 2023-11-02 | End: 2023-11-02

## 2023-11-02 RX ADMIN — IOHEXOL 75 ML: 350 INJECTION, SOLUTION INTRAVENOUS at 11:23

## 2023-11-02 RX ADMIN — GADOTERATE MEGLUMINE 18 ML: 376.9 INJECTION INTRAVENOUS at 12:03

## 2023-11-06 PROBLEM — N88.8 OTHER SPECIFIED NONINFLAMMATORY DISORDERS OF CERVIX UTERI: Status: ACTIVE | Noted: 2023-07-19

## 2023-11-06 PROBLEM — E04.1 NONTOXIC SINGLE THYROID NODULE: Status: ACTIVE | Noted: 2023-11-06

## 2023-11-06 PROBLEM — E66.3 OVERWEIGHT WITH BODY MASS INDEX (BMI) OF 25 TO 25.9 IN ADULT: Status: ACTIVE | Noted: 2023-11-06

## 2023-11-06 PROBLEM — M54.9 DORSALGIA: Status: ACTIVE | Noted: 2019-03-04

## 2023-11-06 PROBLEM — N39.0 URINARY TRACT INFECTION: Status: ACTIVE | Noted: 2018-06-30

## 2023-11-06 PROBLEM — D36.7 BENIGN NEOPLASM OF LOWER EXTREMITY: Status: ACTIVE | Noted: 2020-08-27

## 2023-11-06 PROBLEM — D22.60 MELANOCYTIC NEVI OF UNSPECIFIED UPPER LIMB, INCLUDING SHOULDER: Status: ACTIVE | Noted: 2020-08-27

## 2023-11-06 PROBLEM — D25.9 LEIOMYOMA OF UTERUS: Status: ACTIVE | Noted: 2023-08-01

## 2023-11-06 PROBLEM — C43.9 METASTATIC MELANOMA (MULTI): Status: ACTIVE | Noted: 2023-11-06

## 2023-11-06 PROBLEM — D36.7 BENIGN NEOPLASM OF UPPER EXTREMITY: Status: ACTIVE | Noted: 2020-08-27

## 2023-11-06 PROBLEM — Z85.820 HISTORY OF MELANOMA: Status: ACTIVE | Noted: 2020-08-27

## 2023-11-06 PROBLEM — R06.02 SHORTNESS OF BREATH: Status: ACTIVE | Noted: 2019-03-04

## 2023-11-06 PROBLEM — O20.9 HEMORRHAGE IN EARLY PREGNANCY (HHS-HCC): Status: ACTIVE | Noted: 2018-10-15

## 2023-11-06 PROBLEM — R35.0 FREQUENCY OF MICTURITION: Status: ACTIVE | Noted: 2018-06-30

## 2023-11-06 PROBLEM — Z79.899 OTHER LONG TERM (CURRENT) DRUG THERAPY: Status: ACTIVE | Noted: 2019-03-04

## 2023-11-06 PROBLEM — I61.9 INTRACEREBRAL HEMORRHAGE (MULTI): Status: ACTIVE | Noted: 2023-11-06

## 2023-11-06 PROBLEM — E11.9 DIABETES MELLITUS WITHOUT COMPLICATION (MULTI): Status: ACTIVE | Noted: 2019-03-18

## 2023-11-06 PROBLEM — R56.9 UNSPECIFIED CONVULSIONS (MULTI): Status: ACTIVE | Noted: 2023-04-04

## 2023-11-06 PROBLEM — M79.89 OTHER SPECIFIED SOFT TISSUE DISORDERS: Status: ACTIVE | Noted: 2023-08-19

## 2023-11-06 PROBLEM — Z91.89 PERSONAL HISTORY PRESENTING HAZARDS TO HEALTH: Status: ACTIVE | Noted: 2023-11-06

## 2023-11-06 PROBLEM — N83.209 OVARIAN CYST: Status: ACTIVE | Noted: 2022-12-13

## 2023-11-06 PROBLEM — T63.441A TOXIC EFFECT OF VENOM OF BEES: Status: ACTIVE | Noted: 2023-08-19

## 2023-11-06 PROBLEM — D36.7 BENIGN NEOPLASM OF TRUNK: Status: ACTIVE | Noted: 2020-08-27

## 2023-11-06 PROBLEM — R51.9 HEADACHE: Status: ACTIVE | Noted: 2023-11-06

## 2023-11-06 PROBLEM — R41.82 CHANGE IN MENTAL STATUS: Status: ACTIVE | Noted: 2023-11-06

## 2023-11-06 PROBLEM — D22.70 MELANOCYTIC NEVI OF LOWER LIMB, INCLUDING HIP: Status: ACTIVE | Noted: 2020-08-27

## 2023-11-06 PROBLEM — O03.9: Status: ACTIVE | Noted: 2018-11-07

## 2023-11-06 PROBLEM — K76.9 LIVER DISEASE: Status: ACTIVE | Noted: 2023-07-29

## 2023-11-06 PROBLEM — Z98.890 OTHER SPECIFIED POSTPROCEDURAL STATES: Status: ACTIVE | Noted: 2023-04-03

## 2023-11-06 PROBLEM — C77.9 SECONDARY AND UNSPECIFIED MALIGNANT NEOPLASM OF LYMPH NODE, UNSPECIFIED (MULTI): Status: ACTIVE | Noted: 2023-05-18

## 2023-11-06 PROBLEM — L81.4 LENTIGINES: Status: ACTIVE | Noted: 2020-08-27

## 2023-11-06 PROBLEM — Z88.2 ALLERGY TO SULFA DRUGS: Status: ACTIVE | Noted: 2019-03-04

## 2023-11-06 PROBLEM — R16.0 HEPATOMEGALY, NOT ELSEWHERE CLASSIFIED: Status: ACTIVE | Noted: 2023-07-29

## 2023-11-06 PROBLEM — E27.9 DISORDER OF ADRENAL GLAND, UNSPECIFIED (MULTI): Status: ACTIVE | Noted: 2023-07-29

## 2023-11-06 PROBLEM — T63.441A BEE STING: Status: ACTIVE | Noted: 2023-11-06

## 2023-11-06 RX ORDER — AZITHROMYCIN 250 MG/1
TABLET, FILM COATED ORAL
COMMUNITY
Start: 2023-08-01 | End: 2023-12-15 | Stop reason: ALTCHOICE

## 2023-11-06 RX ORDER — METRONIDAZOLE 500 MG/1
TABLET ORAL
COMMUNITY
Start: 2023-06-16 | End: 2023-12-15 | Stop reason: ALTCHOICE

## 2023-11-09 ENCOUNTER — OFFICE VISIT (OUTPATIENT)
Dept: HEMATOLOGY/ONCOLOGY | Facility: CLINIC | Age: 43
End: 2023-11-09
Payer: COMMERCIAL

## 2023-11-09 VITALS
WEIGHT: 190.48 LBS | SYSTOLIC BLOOD PRESSURE: 122 MMHG | BODY MASS INDEX: 28.87 KG/M2 | RESPIRATION RATE: 16 BRPM | HEIGHT: 68 IN | TEMPERATURE: 96.3 F | DIASTOLIC BLOOD PRESSURE: 80 MMHG | HEART RATE: 53 BPM | OXYGEN SATURATION: 98 %

## 2023-11-09 DIAGNOSIS — C79.31 MALIGNANT MELANOMA METASTATIC TO BRAIN (MULTI): Primary | ICD-10-CM

## 2023-11-09 PROCEDURE — 1036F TOBACCO NON-USER: CPT | Performed by: NURSE PRACTITIONER

## 2023-11-09 PROCEDURE — 3079F DIAST BP 80-89 MM HG: CPT | Performed by: NURSE PRACTITIONER

## 2023-11-09 PROCEDURE — 99215 OFFICE O/P EST HI 40 MIN: CPT | Performed by: NURSE PRACTITIONER

## 2023-11-09 PROCEDURE — 3074F SYST BP LT 130 MM HG: CPT | Performed by: NURSE PRACTITIONER

## 2023-11-09 PROCEDURE — 99205 OFFICE O/P NEW HI 60 MIN: CPT | Performed by: NURSE PRACTITIONER

## 2023-11-09 ASSESSMENT — ENCOUNTER SYMPTOMS
EYES NEGATIVE: 1
RESPIRATORY NEGATIVE: 1
ENDOCRINE NEGATIVE: 1
ROS GI COMMENTS: GERD
NEUROLOGICAL NEGATIVE: 1
CONSTITUTIONAL NEGATIVE: 1
DEPRESSION: 0
CARDIOVASCULAR NEGATIVE: 1
PSYCHIATRIC NEGATIVE: 1
LOSS OF SENSATION IN FEET: 0
GASTROINTESTINAL NEGATIVE: 1
OCCASIONAL FEELINGS OF UNSTEADINESS: 0
HEMATOLOGIC/LYMPHATIC NEGATIVE: 1
MUSCULOSKELETAL NEGATIVE: 1

## 2023-11-09 ASSESSMENT — LIFESTYLE VARIABLES
HOW MANY STANDARD DRINKS CONTAINING ALCOHOL DO YOU HAVE ON A TYPICAL DAY: 1 OR 2
AUDIT-C TOTAL SCORE: 1
HOW OFTEN DO YOU HAVE A DRINK CONTAINING ALCOHOL: MONTHLY OR LESS
SKIP TO QUESTIONS 9-10: 1
HOW OFTEN DO YOU HAVE SIX OR MORE DRINKS ON ONE OCCASION: NEVER

## 2023-11-09 ASSESSMENT — PATIENT HEALTH QUESTIONNAIRE - PHQ9
SUM OF ALL RESPONSES TO PHQ9 QUESTIONS 1 AND 2: 0
2. FEELING DOWN, DEPRESSED OR HOPELESS: NOT AT ALL
1. LITTLE INTEREST OR PLEASURE IN DOING THINGS: NOT AT ALL

## 2023-11-09 ASSESSMENT — COLUMBIA-SUICIDE SEVERITY RATING SCALE - C-SSRS
1. IN THE PAST MONTH, HAVE YOU WISHED YOU WERE DEAD OR WISHED YOU COULD GO TO SLEEP AND NOT WAKE UP?: NO
2. HAVE YOU ACTUALLY HAD ANY THOUGHTS OF KILLING YOURSELF?: NO
6. HAVE YOU EVER DONE ANYTHING, STARTED TO DO ANYTHING, OR PREPARED TO DO ANYTHING TO END YOUR LIFE?: NO

## 2023-11-09 ASSESSMENT — PAIN SCALES - GENERAL: PAINLEVEL: 0-NO PAIN

## 2023-11-09 NOTE — LETTER
November 9, 2023     Patient: Tori Taylor   YOB: 1980   Date of Visit: 11/9/2023       To the [employer],    It is my medical opinion that Tori Taylor should continue to work remote at this time. She is currently under my care receiving certain treatments, and has driving restrictions associated with them. These will remain in place until further evaluation.     If you have any questions or concerns, please don't hesitate to call. I can be reached at [phone].    Sincerely,    DAPHNE Negro-CNP    CC: No Recipients

## 2023-11-09 NOTE — PATIENT INSTRUCTIONS
Ms. Taylor,   It was a pleasure meeting you today.     As discussed we will be conducting surveillance CT scans and MRI brain in 3 months on 02/05/2024. We will meet again on 02/08/2024. I have made a referral to Dr. John Musa in neuro oncology for you.      Our offices will be reaching out to you to make all the appointments. I am always available at the phone numbers listed below for an earlier appointment should you wish one.     In case of an emergency please dial 911 or report to your nearest Emergency Room.  For all other questions, please do not hesitate to reach out to us at the number listed below.     Thank you for choosing [facility] at [facility].   We appreciate your visit.      MD Estephania Coello, DAPHNE Villalta RN     Sarcoma and Cutaneous Oncology team     Phone: [phone]  Fax: [phone].

## 2023-11-09 NOTE — PROGRESS NOTES
.Patient ID: Tori Templeton is a 43 y.o. female.  Referring Physician: No referring provider defined for this encounter.  Primary Care Provider: Silvia Sanon MD     Chief Complaint   Patient presents with    Follow-up     HPI  Cancer History:          Skin - Melanoma         AJCC Edition: 7th, Diagnosis Date: 16-Jun-2014, Stage III, T1a N1a M0          Melanoma of the Skin         AJCC Edition: 8th (AJCC), Diagnosis Date: 28-Jun-2020, Stage IV, pTX cN1 cM1d(1)      Treatment Synopsis:    Ms. TORI TEMPLETON is diagnosed with stage 3 melanoma in 2014, treated by Dr. Mariano Brown with adjuvant Interferon therapy. 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 Detailed history as below (recorded by Dr. Steve Vega).  - History of metastatic melanoma diagnosed in 2014 s/p local excision of right forearm with partial thickness flap and axillary LND s/p 10 months of adjuvant interferon alpha therapy, stopped due to thyroiditis.  - Solitary brain metastasis left parietal lobe, status post left-sided craniotomy for hemorrhagic tumor resection and duraplasty with pericranial graft by Dr. Ruth Veliz on  6/26/2020.  - Completed adjuvant SBRT therapy post-resection site by Dr. Leticia Mendoza--dates 7/27-31/2020  - Pending PET/CT for 4 mm solitary RUL pulmonary nodule, sub cm liver lesion, and enlarged left adrenal gland.  - 8/7/2020:  Discussion for immunotherapy vs. 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  - 8/21/2020: Discussed results of 8/13/2020 PET/CT, discuss immunotherapy options after tumor board meeting 8/17/2020.  Consent for Pembrolizumab.  - Patient received 5 Pembrolizumab. infusions since September 2020.  Hospitalized for confusion episode concerning for focal seizure.  MRI more suggesting of immunotherapy flare rather than progression of disease (missed last 2 doses due to symptoms).  - Plan to continue  "Keppra and resumed Pembrolizumab 2/1/2021  - Restaging scans 5/2022 with ALMA in brain or PET CT C/A/P  - Completed 2 years of Pembrolizumab, restaging  - Followed with serial imaging.  - 7/2023: CT C/A/P with an enlarging 1 cm liver lesion, uncertain etiology.  Stable pulmonary nodule.  Cervical cyst for which she will see gynecology.  No changes in MRI brain.  - 7/29/23: MRI Liver does not show any pathologic lesion.      She established care with our medical oncology team (Dr. Wilmer Welch) on 8/10/23.      Treatment History:    2014-7-3: Cancer Related Surgery: OPERATION/PROCEDURE:    Wide excision malignant melanoma of right arm, 10 x 5 cm skin      excision.  Full-thickness skin graft to right arm skin and soft tissue      defect, 6 x 3 cm.  Right axillary sentinel lymph node biopsy.  2014-7-3: Cancer Related Surgery: Wide local excision and sentinel lymph node biopsy with 1 of 2 nodes positive for metastatic disease  2014-7-3: Cancer Related Surgery: Right axillary raji dissection with 0/42 nodes with evidence of disease  2014-9-2: Chemotherapy: commenced adjuvant high dose IFN alpha 2B     2020-9-14: New Treatment Plan: First dose Pembrolizumab 200mg IV every 3 weeks (5 doses, then missed 2 doses for concern of new findings on MRI)--Resumed #6 2/1/2021    Subjective   Please refer to \"Notes/Cancer History\" above for complete History of present illness.     Ms. Tori Taylor     - Presents to clinic alone.  - Feels well overall.  - No constitutional symptoms.  - Continues to follow closely with derm for skin checks.   - Has questions about Keppra and driving. Has not been following with anyone from neuro oncology.     Review of Systems:   Review of Systems   Constitutional: Negative.    HENT:  Negative.     Eyes: Negative.    Respiratory: Negative.     Cardiovascular: Negative.    Gastrointestinal: Negative.         GERD   Endocrine: Negative.    Genitourinary: Negative.     Musculoskeletal: Negative.  "   Skin: Negative.    Neurological: Negative.    Hematological: Negative.    Psychiatric/Behavioral: Negative.           MEDICAL HISTORY  Past Medical History:   Diagnosis Date    Lymphedema, not elsewhere classified 02/10/2020    Lymphedema    Malignant melanoma of right upper limb, including shoulder (CMS/[facility]) 06/27/2016    Melanoma of right upper arm    Malignant melanoma of unspecified upper limb, including shoulder (CMS/[facility]) 04/17/2020    Melanoma of upper limb    Nontoxic single thyroid nodule 05/12/2021    Right thyroid nodule    Other abnormal glucose 06/15/2020    Elevated glucose    Personal history of irradiation     History of radiation therapy    Personal history of malignant melanoma of skin     History of malignant melanoma    Personal history of other diseases of the respiratory system     Personal history of asthma    Personal history of other endocrine, nutritional and metabolic disease     History of hypothyroidism    Personal history of other medical treatment     H/O chest x-ray    Personal history of other specified conditions 10/21/2020    History of weight loss    Secondary malignant neoplasm of brain (CMS/[facility]) 09/18/2020    Malignant melanoma metastatic to brain    Unspecified asthma with (acute) exacerbation 02/10/2020    Asthma with acute exacerbation       FAMILY HISTORY  Family History   Problem Relation Name Age of Onset    Deep vein thrombosis Mother  40    Diabetes Father         TOBACCO HISTORY  Tobacco Use: Low Risk  (5/19/2023)    Patient History     Smoking Tobacco Use: Never     Smokeless Tobacco Use: Never     Passive Exposure: Not on file       SOCIAL HISTORY  Social Connections: Not on file   , has a son and daughter. Works full time for the [employer]      Outpatient Medication Profile:  Current Outpatient Medications on File Prior to Visit   Medication Sig Dispense Refill    acetaminophen (Tylenol) 500 mg tablet Take 1 tablet (500 mg) by  "mouth.      albuterol 90 mcg/actuation inhaler Inhale 2 puffs 4 times a day. 18 g 3    BD Luer-Juliana Syringe 3 mL 25 gauge x 1\" syringe USE 1 SYRINGE TWICE WEEKLY FOR B12 INJECTIONS 8 each 11    cholecalciferol (Vitamin D-3) 5,000 Units tablet Take 1 tablet (5,000 Units) by mouth once daily.      copper (ParaGard T 380A) 380 square mm IUD by intrauterine route.      cyanocobalamin (Vitamin B-12) 1,000 mcg/mL injection 1 mL (1,000 mcg) 2 times a week.      fexofenadine (Allegra) 180 mg tablet Take 1 tablet (180 mg) by mouth once daily as needed.      Keppra 500 mg tablet Take 1 tablet (500 mg) by mouth 2 times a day.      mometasone (Nasonex) 50 mcg/actuation nasal spray Administer 1 spray into affected nostril(s) once daily.      azithromycin (Zithromax) 250 mg tablet TAKE AS DIRECTED UNTIL GONE      metroNIDAZOLE (Flagyl) 500 mg tablet TAKE 1 TABLET BY MOUTH TWICE DAILY UNTIL FINISHED. NO ALCOHOL DURING THE COURSE OF TREATMENT AND UP TO 3 DAYS AFTER      [DISCONTINUED] escitalopram (Lexapro) 10 mg tablet Take one daily for 2 weeks then 2 per day 15 tablet 5    [DISCONTINUED] traZODone (Desyrel) 50 mg tablet 1/2/ to one po at bedtime prn 30 tablet 1     No current facility-administered medications on file prior to visit.         Performance Status:  Asymptomatic     Vitals and Measurements:   /80 (BP Location: Right arm, Patient Position: Sitting)   Pulse 53   Temp 35.7 °C (96.3 °F) (Temporal)   Resp 16   Ht (S) 1.728 m (5' 8.03\")   Wt 86.4 kg (190 lb 7.6 oz)   SpO2 98%   BMI 28.94 kg/m²       Physical Exam:   Physical Exam  Constitutional:       Appearance: Normal appearance.   HENT:      Head: Normocephalic and atraumatic.      Nose: Nose normal.      Mouth/Throat:      Mouth: Mucous membranes are moist.      Pharynx: Oropharynx is clear.   Eyes:      Conjunctiva/sclera: Conjunctivae normal.   Cardiovascular:      Rate and Rhythm: Normal rate and regular rhythm.      Pulses: Normal pulses.      Heart " sounds: Normal heart sounds.   Pulmonary:      Effort: Pulmonary effort is normal.      Breath sounds: Normal breath sounds.   Abdominal:      General: Bowel sounds are normal.      Palpations: Abdomen is soft.   Musculoskeletal:         General: Normal range of motion.      Cervical back: Neck supple.   Skin:     General: Skin is warm and dry.   Neurological:      General: No focal deficit present.      Mental Status: She is alert and oriented to person, place, and time.   Psychiatric:         Mood and Affect: Mood normal.         Behavior: Behavior normal.      Lab Results:  I have reviewed these laboratory results:     Lab Results   Component Value Date    WBC 7.8 11/02/2023    HGB 13.3 11/02/2023    HCT 40.0 11/02/2023    MCV 98 11/02/2023     11/02/2023         Chemistry    Lab Results   Component Value Date/Time     11/02/2023 1113    K 3.7 11/02/2023 1113     11/02/2023 1113    CO2 29 11/02/2023 1113    BUN 8 11/02/2023 1113    CREATININE 0.68 11/02/2023 1113    Lab Results   Component Value Date/Time    CALCIUM 9.4 11/02/2023 1113    ALKPHOS 36 11/02/2023 1113    AST 14 11/02/2023 1113    ALT 9 11/02/2023 1113    BILITOT 0.7 11/02/2023 1113        Lab Results   Component Value Date    TSH 2.47 11/02/2023    THYROIDPAB 29 09/07/2021        Radiology Result:  I have reviewed the latest Imaging in PACS and the findings are noted in this note. I discussed the results of the latest imaging with the patient. All previous imaging were reviewed at the time it was completed. Full records are available in the EMR for review as well.     === 11/02/23 ===    CT CHEST ABDOMEN PELVIS W IV CONTRAST    - Impression -  CHEST:  1. No intrathoracic metastatic disease.  2. Status post right axillary lymph node dissection with no recurrent  enlarged lymph nodes.    ABDOMEN-PELVIS:  1. Stable heterogenous appearance of the right hepatic lobe with a  few hypodensities (previously characterized in liver MRI  dated  07/29/2023 as benign observations).  2. No new concerning findings in the abdomen or pelvis.  3. Stable left adrenal adenoma measuring 2.2 cm.  4. Other ancillary findings are unchanged.    Signed by: Shyam Jimenez 11/3/2023 12:46 PM  Dictation workstation:   [id number]    === 11/02/23 ===    MR BRAIN W AND WO CONTRAST    - Impression -  1.  There is no evidence of acute hemorrhage, mass lesion or acute  infarction.  .  2. Stable radiation changes and postsurgical changes of left parietal  lobe craniotomy with pericranial graft placement.    I personally reviewed the images/study and I agree with the findings  as stated by Dr. Alonso Tejada. This study was interpreted at [place], [place].    Signed by: Ray Asencio 11/2/2023 2:25 PM  Dictation workstation:   [id number]    CT chest abdomen pelvis w IV contrast    Result Date: 11/3/2023  Impression: CHEST: 1. No intrathoracic metastatic disease. 2. Status post right axillary lymph node dissection with no recurrent enlarged lymph nodes.   ABDOMEN-PELVIS: 1. Stable heterogenous appearance of the right hepatic lobe with a few hypodensities (previously characterized in liver MRI dated 07/29/2023 as benign observations). 2. No new concerning findings in the abdomen or pelvis. 3. Stable left adrenal adenoma measuring 2.2 cm. 4. Other ancillary findings are unchanged.     MACRO: None   Signed by: Shyam Jimenez 11/3/2023 12:46 PM Dictation workstation:   [id number]    MR brain w and wo IV contrast    Result Date: 11/2/2023  Impression: 1.  There is no evidence of acute hemorrhage, mass lesion or acute infarction.  . 2. Stable radiation changes and postsurgical changes of left parietal lobe craniotomy with pericranial graft placement.     I personally reviewed the images/study and I agree with the findings as stated by Dr. Alonso Tejada. This study was interpreted at [facility],  [place].   MACRO: None   Signed by: Ray Asencio 11/2/2023 2:25 PM Dictation workstation:   [id number]     Pathology Results:  I have reviewed the full pathology report recorded in the EMR. The pertinent portions indicating diagnosis are listed here in the note. for details please refer to the full report recorded in the EMR.    Surgical Pathology [Jul 10 2020 6:44PM] ([id number])     Specimens: LEFT BRAIN MASS /Received fresh for intraoperative consultation, labeled with the patient's     Name TORI TEMPLETON     Accession #: [id number]   Pathologist: BREE CAMARGO MD   Date of Procedure: 6/26/2020    Date Received: 6/26/2020   Date Reported 7/1/2020   Submitting Physician: WESTON OBREGON MD   Location: [facility] Other External #     FINAL DIAGNOSIS   A. LEFT BRAIN MASS, REMOVAL:   --METASTATIC MALIGNANT MELANOMA.      Note: SOX10 immunoreactivity is identified within tumor cell nuclei.     The gross and/or microscopic findings were reviewed in conjunction with pathology resident, Cheng Castro MD.      Assessment and Plan:   Assessment/Plan   Ms. Tori Templeton is a 43 y.o. female with a diagnosis of stage 4 melanoma. She was diagnosed with stage 4 melanoma with solitary metastasis to the brain which was resected and radiated (SBRT) and then she received 2 years of Keytruda (last 09/2022) with no recurrence to date. Please see the evolution of the case listed above in the cancer history.     We discussed that we will be conducting CT scan every 3 months till Sept 2024 and then every 6 months till Sept 2027 and then once a year till Sept 2032. Of course, this frequency will change according to the symptoms of the patient. MRI brain will be  ordered according to the same rule.     # Melanoma  - CT CAP and brain MRI 11/02/2023 showing stable changes.   - RTC 02/08/2024 at [place].  - CT scan, brain MRI and labs prior on 02/05/2024.  - Continue skin checks with dermatology.    # Hx brain mets  -  Referral made to Dr. Musa in neuro oncology.      # Health maintenance  - Continues to follow with her PCP for wellness visits and age appropriate cancer screenings.   - Continue to follow with OBGYN.      DISCLAIMER:   In preparing for this visit and writing this note, I reviewed all the previous electronic medical records (labs, imaging and medical charts) of the patient available in the physician portal. Significant findings which helped in decision making are recorded  in this chart.     The plan was discussed with the patient. We gave her ample opportunities to ask questions. All questions were answered to her satisfaction and she verbalized understanding.

## 2023-12-01 ENCOUNTER — APPOINTMENT (OUTPATIENT)
Dept: NEUROSURGERY | Facility: HOSPITAL | Age: 43
End: 2023-12-01
Payer: COMMERCIAL

## 2023-12-08 DIAGNOSIS — R73.09 ELEVATED GLUCOSE: Primary | ICD-10-CM

## 2023-12-15 DIAGNOSIS — R73.09 ELEVATED GLUCOSE: Primary | ICD-10-CM

## 2023-12-15 PROBLEM — L90.5 SCAR CONDITION AND FIBROSIS OF SKIN: Status: ACTIVE | Noted: 2020-08-27

## 2023-12-15 PROBLEM — E11.9 DIABETES MELLITUS WITHOUT COMPLICATION (MULTI): Status: RESOLVED | Noted: 2019-03-18 | Resolved: 2023-12-15

## 2023-12-15 PROBLEM — D48.5 NEOPLASM OF UNCERTAIN BEHAVIOR OF SKIN: Status: ACTIVE | Noted: 2020-08-27

## 2023-12-15 PROBLEM — R90.0 INTRACRANIAL MASS: Status: ACTIVE | Noted: 2023-12-15

## 2023-12-15 PROBLEM — Z86.39 HISTORY OF HYPOTHYROIDISM: Status: ACTIVE | Noted: 2023-12-15

## 2023-12-15 PROBLEM — R00.2 PALPITATIONS: Status: ACTIVE | Noted: 2021-03-23

## 2023-12-15 PROBLEM — R59.0 AXILLARY LYMPHADENOPATHY: Status: ACTIVE | Noted: 2023-12-15

## 2023-12-15 PROBLEM — Z97.5 USES INTRAUTERINE DEVICE FOR BIRTH CONTROL: Status: ACTIVE | Noted: 2023-08-01

## 2023-12-15 PROBLEM — C79.31 MALIGNANT NEOPLASM METASTATIC TO BRAIN (MULTI): Status: ACTIVE | Noted: 2022-06-20

## 2023-12-15 RX ORDER — MUPIROCIN 20 MG/G
OINTMENT TOPICAL
COMMUNITY
Start: 2023-12-01 | End: 2024-02-08 | Stop reason: WASHOUT

## 2024-02-05 ENCOUNTER — HOSPITAL ENCOUNTER (OUTPATIENT)
Dept: RADIOLOGY | Facility: CLINIC | Age: 44
Discharge: HOME | End: 2024-02-05
Payer: COMMERCIAL

## 2024-02-05 ENCOUNTER — LAB (OUTPATIENT)
Dept: LAB | Facility: LAB | Age: 44
End: 2024-02-05
Payer: COMMERCIAL

## 2024-02-05 DIAGNOSIS — R73.09 ELEVATED GLUCOSE: ICD-10-CM

## 2024-02-05 DIAGNOSIS — C79.31 MALIGNANT MELANOMA METASTATIC TO BRAIN (MULTI): ICD-10-CM

## 2024-02-05 LAB
ALBUMIN SERPL BCP-MCNC: 4.9 G/DL (ref 3.4–5)
ALP SERPL-CCNC: 41 U/L (ref 33–110)
ALT SERPL W P-5'-P-CCNC: 9 U/L (ref 7–45)
ANION GAP SERPL CALC-SCNC: 10 MMOL/L (ref 10–20)
AST SERPL W P-5'-P-CCNC: 13 U/L (ref 9–39)
BASOPHILS # BLD AUTO: 0.1 X10*3/UL (ref 0–0.1)
BASOPHILS NFR BLD AUTO: 1.2 %
BILIRUB SERPL-MCNC: 0.4 MG/DL (ref 0–1.2)
BUN SERPL-MCNC: 17 MG/DL (ref 6–23)
CALCIUM SERPL-MCNC: 9.7 MG/DL (ref 8.6–10.3)
CHLORIDE SERPL-SCNC: 102 MMOL/L (ref 98–107)
CO2 SERPL-SCNC: 29 MMOL/L (ref 21–32)
CREAT SERPL-MCNC: 0.73 MG/DL (ref 0.5–1.05)
EGFRCR SERPLBLD CKD-EPI 2021: >90 ML/MIN/1.73M*2
EOSINOPHIL # BLD AUTO: 0.68 X10*3/UL (ref 0–0.7)
EOSINOPHIL NFR BLD AUTO: 8.5 %
ERYTHROCYTE [DISTWIDTH] IN BLOOD BY AUTOMATED COUNT: 13 % (ref 11.5–14.5)
EST. AVERAGE GLUCOSE BLD GHB EST-MCNC: 97 MG/DL
GLUCOSE SERPL-MCNC: 77 MG/DL (ref 74–99)
HBA1C MFR BLD: 5 %
HCT VFR BLD AUTO: 43.9 % (ref 36–46)
HGB BLD-MCNC: 14.8 G/DL (ref 12–16)
IMM GRANULOCYTES # BLD AUTO: 0.02 X10*3/UL (ref 0–0.7)
IMM GRANULOCYTES NFR BLD AUTO: 0.2 % (ref 0–0.9)
LDH SERPL L TO P-CCNC: 115 U/L (ref 84–246)
LYMPHOCYTES # BLD AUTO: 2.18 X10*3/UL (ref 1.2–4.8)
LYMPHOCYTES NFR BLD AUTO: 27.1 %
MCH RBC QN AUTO: 33 PG (ref 26–34)
MCHC RBC AUTO-ENTMCNC: 33.7 G/DL (ref 32–36)
MCV RBC AUTO: 98 FL (ref 80–100)
MONOCYTES # BLD AUTO: 0.54 X10*3/UL (ref 0.1–1)
MONOCYTES NFR BLD AUTO: 6.7 %
NEUTROPHILS # BLD AUTO: 4.51 X10*3/UL (ref 1.2–7.7)
NEUTROPHILS NFR BLD AUTO: 56.3 %
NRBC BLD-RTO: 0 /100 WBCS (ref 0–0)
PLATELET # BLD AUTO: 231 X10*3/UL (ref 150–450)
POTASSIUM SERPL-SCNC: 4.1 MMOL/L (ref 3.5–5.3)
PROT SERPL-MCNC: 7.6 G/DL (ref 6.4–8.2)
RBC # BLD AUTO: 4.48 X10*6/UL (ref 4–5.2)
SODIUM SERPL-SCNC: 137 MMOL/L (ref 136–145)
WBC # BLD AUTO: 8 X10*3/UL (ref 4.4–11.3)

## 2024-02-05 PROCEDURE — 74177 CT ABD & PELVIS W/CONTRAST: CPT | Performed by: STUDENT IN AN ORGANIZED HEALTH CARE EDUCATION/TRAINING PROGRAM

## 2024-02-05 PROCEDURE — 74177 CT ABD & PELVIS W/CONTRAST: CPT

## 2024-02-05 PROCEDURE — 70553 MRI BRAIN STEM W/O & W/DYE: CPT

## 2024-02-05 PROCEDURE — 36415 COLL VENOUS BLD VENIPUNCTURE: CPT

## 2024-02-05 PROCEDURE — 71260 CT THORAX DX C+: CPT | Performed by: STUDENT IN AN ORGANIZED HEALTH CARE EDUCATION/TRAINING PROGRAM

## 2024-02-05 PROCEDURE — A9575 INJ GADOTERATE MEGLUMI 0.1ML: HCPCS | Performed by: NURSE PRACTITIONER

## 2024-02-05 PROCEDURE — 85025 COMPLETE CBC W/AUTO DIFF WBC: CPT

## 2024-02-05 PROCEDURE — 2550000001 HC RX 255 CONTRASTS: Performed by: NURSE PRACTITIONER

## 2024-02-05 PROCEDURE — 70553 MRI BRAIN STEM W/O & W/DYE: CPT | Performed by: RADIOLOGY

## 2024-02-05 PROCEDURE — 83615 LACTATE (LD) (LDH) ENZYME: CPT

## 2024-02-05 PROCEDURE — 83036 HEMOGLOBIN GLYCOSYLATED A1C: CPT

## 2024-02-05 PROCEDURE — 80053 COMPREHEN METABOLIC PANEL: CPT

## 2024-02-05 RX ORDER — GADOTERATE MEGLUMINE 376.9 MG/ML
18 INJECTION INTRAVENOUS
Status: COMPLETED | OUTPATIENT
Start: 2024-02-05 | End: 2024-02-05

## 2024-02-05 RX ADMIN — IOHEXOL 75 ML: 350 INJECTION, SOLUTION INTRAVENOUS at 12:22

## 2024-02-05 RX ADMIN — GADOTERATE MEGLUMINE 18 ML: 376.9 INJECTION INTRAVENOUS at 11:50

## 2024-02-08 ENCOUNTER — OFFICE VISIT (OUTPATIENT)
Dept: HEMATOLOGY/ONCOLOGY | Facility: CLINIC | Age: 44
End: 2024-02-08
Payer: COMMERCIAL

## 2024-02-08 VITALS
BODY MASS INDEX: 30.27 KG/M2 | HEART RATE: 67 BPM | RESPIRATION RATE: 16 BRPM | DIASTOLIC BLOOD PRESSURE: 75 MMHG | SYSTOLIC BLOOD PRESSURE: 114 MMHG | OXYGEN SATURATION: 98 % | WEIGHT: 199.08 LBS | TEMPERATURE: 97.2 F

## 2024-02-08 DIAGNOSIS — C79.31 MALIGNANT MELANOMA METASTATIC TO BRAIN (MULTI): ICD-10-CM

## 2024-02-08 DIAGNOSIS — C79.31 MALIGNANT NEOPLASM METASTATIC TO BRAIN (MULTI): Primary | ICD-10-CM

## 2024-02-08 PROCEDURE — 99215 OFFICE O/P EST HI 40 MIN: CPT | Performed by: NURSE PRACTITIONER

## 2024-02-08 PROCEDURE — 1036F TOBACCO NON-USER: CPT | Performed by: NURSE PRACTITIONER

## 2024-02-08 ASSESSMENT — ENCOUNTER SYMPTOMS
DEPRESSION: 0
MUSCULOSKELETAL NEGATIVE: 1
PSYCHIATRIC NEGATIVE: 1
RESPIRATORY NEGATIVE: 1
HEMATOLOGIC/LYMPHATIC NEGATIVE: 1
OCCASIONAL FEELINGS OF UNSTEADINESS: 0
NEUROLOGICAL NEGATIVE: 1
ENDOCRINE NEGATIVE: 1
EYES NEGATIVE: 1
CARDIOVASCULAR NEGATIVE: 1
GASTROINTESTINAL NEGATIVE: 1
CONSTITUTIONAL NEGATIVE: 1
LOSS OF SENSATION IN FEET: 0

## 2024-02-08 ASSESSMENT — PAIN SCALES - GENERAL: PAINLEVEL: 0-NO PAIN

## 2024-02-08 NOTE — PATIENT INSTRUCTIONS
Ms. Taylor,   It was a pleasure meeting you today.     As discussed we will be conducting surveillance CT scans and MRI brain in 3 months on 05/06/2024. We will meet again in clinic on 05/09/2024. Please keep your appointments with neuro oncology, OBGYN and your PCP.      Our offices will be reaching out to you to make all the appointments. I am always available at the phone numbers listed below for an earlier appointment should you wish one.     In case of an emergency please dial 911 or report to your nearest Emergency Room.  For all other questions, please do not hesitate to reach out to us at the number listed below.     Thank you for choosing [facility] at [facility].   We appreciate your visit.      MD Estephania Coello APRN Taylor Costello, RN     Sarcoma and Cutaneous Oncology team     Phone: [phone]  Fax: [phone].

## 2024-02-08 NOTE — PROGRESS NOTES
.Patient ID: Tori Templeton is a 43 y.o. female.  Referring Physician: Estephania Anton, APRN-CNP  [id number] [place] [address]  Primary Care Provider: Silvia Sanon MD     Chief Complaint   Patient presents with    Follow-up     HPI  Cancer History:          Skin - Melanoma         AJCC Edition: 7th, Diagnosis Date: 16-Jun-2014, Stage III, T1a N1a M0          Melanoma of the Skin         AJCC Edition: 8th (AJCC), Diagnosis Date: 28-Jun-2020, Stage IV, pTX cN1 cM1d(1)      Treatment Synopsis:    Ms. TORI TEMPLETON is diagnosed with stage 3 melanoma in 2014, treated by Dr. Mariano Brown with adjuvant Interferon therapy. 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 Detailed history as below (recorded by Dr. Steve Vega).  - History of metastatic melanoma diagnosed in 2014 s/p local excision of right forearm with partial thickness flap and axillary LND s/p 10 months of adjuvant interferon alpha therapy, stopped due to thyroiditis.  - Solitary brain metastasis left parietal lobe, status post left-sided craniotomy for hemorrhagic tumor resection and duraplasty with pericranial graft by Dr. Ruth Veliz on  6/26/2020.  - Completed adjuvant SBRT therapy post-resection site by Dr. Leticia Mendoza--dates 7/27-31/2020  - Pending PET/CT for 4 mm solitary RUL pulmonary nodule, sub cm liver lesion, and enlarged left adrenal gland.  - 8/7/2020:  Discussion for immunotherapy vs. 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  - 8/21/2020: Discussed results of 8/13/2020 PET/CT, discuss immunotherapy options after tumor board meeting 8/17/2020.  Consent for Pembrolizumab.  - Patient received 5 Pembrolizumab. infusions since September 2020.  Hospitalized for confusion episode concerning for focal seizure.  MRI more suggesting of immunotherapy flare rather than progression of disease (missed last 2 doses due to  "symptoms).  - Plan to continue Keppra and resumed Pembrolizumab 2/1/2021  - Restaging scans 5/2022 with ALMA in brain or PET CT C/A/P  - Completed 2 years of Pembrolizumab, restaging  - Followed with serial imaging.  - 7/2023: CT C/A/P with an enlarging 1 cm liver lesion, uncertain etiology.  Stable pulmonary nodule.  Cervical cyst for which she will see gynecology.  No changes in MRI brain.  - 7/29/23: MRI Liver does not show any pathologic lesion.      She established care with our medical oncology team (Dr. Wilmer Welch) on 8/10/23.      Treatment History:    2014-7-3: Cancer Related Surgery: OPERATION/PROCEDURE:    Wide excision malignant melanoma of right arm, 10 x 5 cm skin      excision.  Full-thickness skin graft to right arm skin and soft tissue      defect, 6 x 3 cm.  Right axillary sentinel lymph node biopsy.  2014-7-3: Cancer Related Surgery: Wide local excision and sentinel lymph node biopsy with 1 of 2 nodes positive for metastatic disease  2014-7-3: Cancer Related Surgery: Right axillary raji dissection with 0/42 nodes with evidence of disease  2014-9-2: Chemotherapy: commenced adjuvant high dose IFN alpha 2B     2020-9-14: New Treatment Plan: First dose Pembrolizumab 200mg IV every 3 weeks (5 doses, then missed 2 doses for concern of new findings on MRI)--Resumed #6 2/1/2021    Subjective   Please refer to \"Notes/Cancer History\" above for complete History of present illness.     Ms. Tori Taylor     - Presents to clinic alone.  - Feels well overall.  - No constitutional symptoms.  - Continues to follow closely with derm for skin checks.   - Considering moving to [place] to be closer to her kids when her daughter attends college in the fall.      Review of Systems:   Review of Systems   Constitutional: Negative.    HENT:  Negative.     Eyes: Negative.    Respiratory: Negative.     Cardiovascular: Negative.    Gastrointestinal: Negative.    Endocrine: Negative.    Genitourinary: Negative. "     Musculoskeletal: Negative.    Skin: Negative.    Neurological: Negative.    Hematological: Negative.    Psychiatric/Behavioral: Negative.           MEDICAL HISTORY  Past Medical History:   Diagnosis Date    Lymphedema, not elsewhere classified 02/10/2020    Lymphedema    Malignant melanoma of right upper limb, including shoulder (CMS/[facility]) 06/27/2016    Melanoma of right upper arm    Malignant melanoma of unspecified upper limb, including shoulder (CMS/[facility]) 04/17/2020    Melanoma of upper limb    Nontoxic single thyroid nodule 05/12/2021    Right thyroid nodule    Other abnormal glucose 06/15/2020    Elevated glucose    Personal history of irradiation     History of radiation therapy    Personal history of malignant melanoma of skin     History of malignant melanoma    Personal history of other diseases of the respiratory system     Personal history of asthma    Personal history of other endocrine, nutritional and metabolic disease     History of hypothyroidism    Personal history of other medical treatment     H/O chest x-ray    Personal history of other specified conditions 10/21/2020    History of weight loss    Secondary malignant neoplasm of brain (CMS/[facility]) 09/18/2020    Malignant melanoma metastatic to brain    Unspecified asthma with (acute) exacerbation 02/10/2020    Asthma with acute exacerbation       FAMILY HISTORY  Family History   Problem Relation Name Age of Onset    Deep vein thrombosis Mother  40    Diabetes Father         TOBACCO HISTORY  Tobacco Use: Low Risk  (5/19/2023)    Patient History     Smoking Tobacco Use: Never     Smokeless Tobacco Use: Never     Passive Exposure: Not on file       SOCIAL HISTORY  Social Connections: Not on file   , has a son and daughter. Works full time for the [employer]      Outpatient Medication Profile:  Current Outpatient Medications on File Prior to Visit   Medication Sig Dispense Refill    acetaminophen (Tylenol) 500 mg  "tablet Take 1 tablet (500 mg) by mouth.      albuterol 90 mcg/actuation inhaler Inhale 2 puffs 4 times a day. 18 g 3    BD Luer-Juliana Syringe 3 mL 25 gauge x 1\" syringe USE 1 SYRINGE TWICE WEEKLY FOR B12 INJECTIONS 8 each 11    cholecalciferol (Vitamin D-3) 5,000 Units tablet Take 1 tablet (5,000 Units) by mouth once daily.      copper (ParaGard T 380A) 380 square mm IUD by intrauterine route.      cyanocobalamin (Vitamin B-12) 1,000 mcg/mL injection 1 mL (1,000 mcg) 2 times a week.      fexofenadine (Allegra) 180 mg tablet Take 1 tablet (180 mg) by mouth once daily as needed.      Keppra 500 mg tablet Take 1 tablet (500 mg) by mouth 2 times a day.      mometasone (Nasonex) 50 mcg/actuation nasal spray Administer 1 spray into affected nostril(s) once daily.      [DISCONTINUED] mupirocin (Bactroban) 2 % ointment        No current facility-administered medications on file prior to visit.         Performance Status:  Asymptomatic     Vitals and Measurements:   /75 (BP Location: Right arm, Patient Position: Sitting)   Pulse 67   Temp 36.2 °C (97.2 °F) (Temporal)   Resp 16   Wt 90.3 kg (199 lb 1.2 oz)   LMP 01/15/2024   SpO2 98%   BMI 30.27 kg/m²       Physical Exam:   Physical Exam  Constitutional:       Appearance: Normal appearance.   HENT:      Head: Normocephalic and atraumatic.      Nose: Nose normal.      Mouth/Throat:      Mouth: Mucous membranes are moist.      Pharynx: Oropharynx is clear.   Eyes:      Conjunctiva/sclera: Conjunctivae normal.   Cardiovascular:      Rate and Rhythm: Normal rate and regular rhythm.      Pulses: Normal pulses.      Heart sounds: Normal heart sounds.   Pulmonary:      Effort: Pulmonary effort is normal.      Breath sounds: Normal breath sounds.   Abdominal:      General: Bowel sounds are normal.      Palpations: Abdomen is soft.   Musculoskeletal:         General: Normal range of motion.      Cervical back: Neck supple.   Skin:     General: Skin is warm and dry. "   Neurological:      General: No focal deficit present.      Mental Status: She is alert and oriented to person, place, and time.   Psychiatric:         Mood and Affect: Mood normal.         Behavior: Behavior normal.        Lab Results:  I have reviewed these laboratory results:     Lab Results   Component Value Date    WBC 8.0 02/05/2024    HGB 14.8 02/05/2024    HCT 43.9 02/05/2024    MCV 98 02/05/2024     02/05/2024         Chemistry    Lab Results   Component Value Date/Time     02/05/2024 1051    K 4.1 02/05/2024 1051     02/05/2024 1051    CO2 29 02/05/2024 1051    BUN 17 02/05/2024 1051    CREATININE 0.73 02/05/2024 1051    Lab Results   Component Value Date/Time    CALCIUM 9.7 02/05/2024 1051    ALKPHOS 41 02/05/2024 1051    AST 13 02/05/2024 1051    ALT 9 02/05/2024 1051    BILITOT 0.4 02/05/2024 1051        Lab Results   Component Value Date    TSH 2.47 11/02/2023    THYROIDPAB 29 09/07/2021        Radiology Result:  I have reviewed the latest Imaging in PACS and the findings are noted in this note. I discussed the results of the latest imaging with the patient. All previous imaging were reviewed at the time it was completed. Full records are available in the EMR for review as well.     CT CHEST ABDOMEN PELVIS W IV CONTRAST; 2/5/2024 11:45 am   IMPRESSION:  CHEST:  1. No suspicious pulmonary nodules.  2. Status post right axillary lymph node dissection with no recurrent  or enlarged lymphadenopathy.      ABDOMEN-PELVIS:  1. No intra-abdominal or pelvic metastatic disease.  2. Stable slight heterogeneity of the right hepatic lobe with few  subcentimeter observations (mainly segment 5 measuring 0.7 cm),  previously described and characterized as benign observations in MRI  dated 07/29/2023). No new suspicious liver lesions.  3. Stable right adrenal adenoma measuring 2.2 cm.  4. Other ancillary findings are unchanged.    ========================================================  MR BRAIN W  AND WO IV CONTRAST;  2/5/2024 11:33 a     IMPRESSION:  Similar posttherapy changes, no evidence of progressive disease.     Pathology Results:  I have reviewed the full pathology report recorded in the EMR. The pertinent portions indicating diagnosis are listed here in the note. for details please refer to the full report recorded in the EMR.    Surgical Pathology [Jul 10 2020 6:44PM] ([id number])     Specimens: LEFT BRAIN MASS /Received fresh for intraoperative consultation, labeled with the patient's     Name TORI TEMPLETON     Accession #: [id number]   Pathologist: BREE CAMARGO MD   Date of Procedure: 6/26/2020    Date Received: 6/26/2020   Date Reported 7/1/2020   Submitting Physician: WESTON OBREGON MD   Location: [facility] Other External #     FINAL DIAGNOSIS   A. LEFT BRAIN MASS, REMOVAL:   --METASTATIC MALIGNANT MELANOMA.      Note: SOX10 immunoreactivity is identified within tumor cell nuclei.     The gross and/or microscopic findings were reviewed in conjunction with pathology resident, Cheng Castro MD.      Assessment and Plan:   Assessment/Plan   Ms. Tori Templeton is a 43 y.o. female with a diagnosis of stage 4 melanoma. She was diagnosed with stage 4 melanoma with solitary metastasis to the brain which was resected and radiated (SBRT) and then she received 2 years of Keytruda (last 09/2022) with no recurrence to date. Please see the evolution of the case listed above in the cancer history.     We discussed that we will be conducting CT scan every 3 months till Sept 2024 and then every 6 months till Sept 2027 and then once a year till Sept 2032. Of course, this frequency will change according to the symptoms of the patient. MRI brain will be  ordered according to the same rule.     # Melanoma  - CT CAP and brain MRI 02/05/2024 showing stable changes.   - RTC 05/09/2024 at [facility].  - CT scan and labs prior on 05/06/2024.   - Continue skin checks with dermatology.    # Hx brain  mets  - Referral made to Dr. Musa in neuro oncology.  She has a visit to [facility] 02/19/2024.    # Health maintenance  - Continues to follow with her PCP for wellness visits and age appropriate cancer screenings.   - Continue to follow with OBGYN.      DISCLAIMER:   In preparing for this visit and writing this note, I reviewed all the previous electronic medical records (labs, imaging and medical charts) of the patient available in the physician portal. Significant findings which helped in decision making are recorded  in this chart.     The plan was discussed with the patient. We gave her ample opportunities to ask questions. All questions were answered to her satisfaction and she verbalized understanding.

## 2024-02-19 ENCOUNTER — OFFICE VISIT (OUTPATIENT)
Dept: HEMATOLOGY/ONCOLOGY | Facility: HOSPITAL | Age: 44
End: 2024-02-19
Payer: COMMERCIAL

## 2024-02-19 VITALS
HEART RATE: 74 BPM | DIASTOLIC BLOOD PRESSURE: 74 MMHG | RESPIRATION RATE: 17 BRPM | OXYGEN SATURATION: 99 % | WEIGHT: 196.2 LBS | TEMPERATURE: 97 F | SYSTOLIC BLOOD PRESSURE: 124 MMHG | BODY MASS INDEX: 29.83 KG/M2

## 2024-02-19 DIAGNOSIS — C79.31 MALIGNANT MELANOMA METASTATIC TO BRAIN (MULTI): ICD-10-CM

## 2024-02-19 PROCEDURE — 1036F TOBACCO NON-USER: CPT | Performed by: PSYCHIATRY & NEUROLOGY

## 2024-02-19 PROCEDURE — 99215 OFFICE O/P EST HI 40 MIN: CPT | Performed by: PSYCHIATRY & NEUROLOGY

## 2024-02-19 ASSESSMENT — PATIENT HEALTH QUESTIONNAIRE - PHQ9
2. FEELING DOWN, DEPRESSED OR HOPELESS: NOT AT ALL
1. LITTLE INTEREST OR PLEASURE IN DOING THINGS: NOT AT ALL
SUM OF ALL RESPONSES TO PHQ9 QUESTIONS 1 AND 2: 0

## 2024-02-19 ASSESSMENT — ENCOUNTER SYMPTOMS
DEPRESSION: 0
OCCASIONAL FEELINGS OF UNSTEADINESS: 0
LOSS OF SENSATION IN FEET: 0

## 2024-02-19 ASSESSMENT — PAIN SCALES - GENERAL: PAINLEVEL: 0-NO PAIN

## 2024-02-19 NOTE — PATIENT INSTRUCTIONS
Please follow up with Dr. Musa in 12 weeks. You will have an MRI done at [facility] prior to your visit. Please call [phone] option 5, option 2 for any questions or concerns. Please call [phone] option 1, for any scheduling concerns or issues.

## 2024-02-19 NOTE — PROGRESS NOTES
Patient Visit Information:   Visit Type: Virtual Visit:  An interactive audio  and video telecommunication system which permits real time communications between the patient (at the originating site) and provider (at the distant site) was utilized to provide this telehealth service.   Verbal Consent for Encounter: Verbal consent was  requested and obtained from patient, or from parent/guardian if minor, on this date for a telehealth visit.      Cancer History:          Skin - Melanoma         AJCC Edition: 7th, Diagnosis Date: 16-Jun-2014, Stage III, T1a N1a M0          Melanoma of the Skin         AJCC Edition: 8th (AJCC), Diagnosis Date: 28-Jun-2020, Stage IV, pTX cN1 cM1d(1)      Treatment Synopsis:    --History of metastatic melanoma diagnosed in 2014 s/p local excision of right forearm with partial thickness flap and axillary LND s/p 10 months  of adjuvant interferon alpha therapy, stopped due to thyroiditis.  --Solitary brain metastasis left parietal lobe, status post left-sided craniotomy for hemorrhagic tumor resection and duraplasty with pericranial graft by Dr. Ruth Veliz  on 6/26/2020.  --Completed adjuvant SBRT therapy post-resection site by Dr. Leticia Mendoza--dates 7/27-31/2020  --Pending PET/CT for 4 mm solitary RUL pulmonary nodule, sub cm liver lesion, and enlarged left adrenal gland.  --8/7/2020:  Discussion for immunotherapy vs. 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  --8/21/2020: Discussed results of 8/13/2020 PET/CT, discuss immunotherapy options after tumor board meeting 8/17/2020.  Consent for Pembrolizumab.  --Patient received 5 Pembrolizumab. infusions since September 2020.  Hospitalized for confusion episode concerning for focal seizure.  MRI more  suggesting of immunotherapy flare rather than progression of disease (missed last 2 doses due to symptoms).  --Plan to continue Keppra and resumed Pembrolizumab 2/1/2021  --Restaging scans 5/2022 with ALMA in brain or PET CT  "C/A/P  --Completed 2 years of Pembrolizumab, restaging  --Followed with serial imaging.  -- 7/2023: CT C/A/P with an enlarging 1 cm liver lesion, uncertain etiology.  Stable pulmonary nodule.  Cervical cyst for which she will see gynecology.  No changes in MRI brain.        Treatment History:    2014-7-3: Cancer Related Surgery: OPERATION/PROCEDURE:    Wide excision malignant melanoma of right arm, 10 x 5 cm skin      excision.  Full-thickness skin graft to right arm skin and soft tissue      defect, 6 x 3 cm.  Right axillary sentinel lymph node biopsy.  2014-7-3: Cancer Related Surgery: Wide local excision and sentinel lymph node biopsy with 1 of 2 nodes positive for metastatic disease  2014-7-3: Cancer Related Surgery: Right axillary raji dissection with 0/42 nodes with evidence of disease  2014-9-2: Chemotherapy: commenced adjuvant high dose IFN alpha 2B     2020-9-14: New Treatment Plan: First dose Pembrolizumab 200mg IV every 3 weeks (5 doses, then missed 2 doses for concern of new findings on MRI)--Resumed #6 2/1/2021     History of Present Illness:      ID Statement:    KADE TEMPLETON is a 42 year old Female        Chief Complaint: Follow up of cutaneous melanoma   Interval History:    Neuro-Oncology History and Physical  Diagnosis: Cutaneous melanoma  Staging:  IV  KPS:  90  Molecular: BRAF V 600E +     1/5/21: This is a 41-year-old woman who presents for evaluation after an episode of altered awareness occurring on 12/6/2020.  She states that the episode initially started with flashing lights out  of her right eye, did not notice any out of her left eye, which was followed by an extended period of approximately 45 minutes of confusion. She also experienced confusion which she described as brain \"fogginess\". She slowly returned to her baseline.  She was seen in the emergency department and CT head done at that time did not demonstrate any acute changes. A follow-up MRI occurring on 12/16/2020 " demonstrated 3 new ovoid enhancing foci with flair hyperintensity within the brain parenchyma just deep  to the resection cavity. These were felt to potentially represent therapy effect however metastases were not excluded. She was discussed at tumor board and recommended for advanced imaging and EEG. A spot EEG was done and did not demonstrate any epileptiform  discharges. Advanced imaging is still pending. She presents here for further evaluation.     Since her episode the patient has not noticed any new neurological symptoms. She has not had any recurrence of her visual changes. 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 No weakness numbness headaches or speech difficulties. Her pembrolizumab is currently on hold per Dr. Caceres.     4/4/23: The patient is present virtually for follow up. She is feeling well.  Lost weight intentionally.  No significant complaints.  Asking about  tapering off of Keppra.     INTERVAL HISTORY (2/19/2024): Since the last visit with Dr. Vega, she has been doing very well neurologically. She denies any headaches, seizures, speech issues, visual loss, new focal weakness, gait difficulty, or sensory loss. She remains off of Pembrolizumab and is followed by Dr. Welch for the melanoma. Since she never had a verified seizure in the past, and the recent seizure work-up was negative, she is tapering off of the Keppra slowly per Dr. Vega -- now on Keppra 500 mg every other day. She continues to work full time from home as an  for the [employer] in [facility].      Review of Systems:   Review of Systems:    12 systems were reviewed and are negative except for as described above.        Allergies and Intolerances:       Allergies:         penicillins: Drug Category, Anaphylaxis, Active         Tape - Adhesive, Bandaids, Paper: Environment, Rash, Active          "penicillin: Drug, Unknown, Active         Xopenex: Drug, Unknown, Active         codeine: Drug, Unknown, Active         sulfa drugs: Drug Category, Unknown, Active     Outpatient Medication Profile:  * Patient Currently Takes Medications as of 20-Dec-2022 13:38 documented in Structured Notes         Keppra 500 mg oral tablet : 1 tab(s) orally 2 times a day , Start Date: 29-Aug-2022         prochlorperazine 10 mg oral tablet: 1 tab(s) orally every 8 hours, As  Needed -for nausea and vomiting , Start Date: 15-Feb-2022         ferrous sulfate 325 mg (65 mg elemental iron) oral delayed release tablet : 1 tab(s) orally every other day , Start Date: 28-Apr-2021         acetaminophen 325 mg oral tablet: 2 tab(s) orally every 6 hours, As needed,  Pain - Mild (1-3), Start Date: 28-Jun-2020         Lymphedema sleeve: Right Arm          Fit and Dispense         20- 30 mm Hg         I89.0         , Start Date: 05-Aug-2019         Vitamin D3 5000 intl units oral capsule: 1 cap(s) orally once a day         Allegra 24 Hour Allergy oral tablet: 1 tab(s) orally once a day         Vitamin B12 1000 mcg/mL injectable solution: injectable twice a week             Medical History:         Bilateral leg paresthesia: ICD-10: R20.2, Status: Active         Melanoma metastatic to brain: ICD-10: C79.31, Status: Active         History of thyroiditis: ICD-10: Z86.39, Status: Active         Malignant melanoma: ICD-10: C43.9, Status: Active         MALIG MELANOMA ARM (172.6): Status: Active, Description: Selected  in Cancer Staging Date of Diagnosis: 16-Jun-2014                  Copy From Visit ID: [id number] Problem List Type: Additional Dx         Abdominal pain: ICD-10: R10.9, Status: Active        Social History:   Social Substance History:  ·  Smoking Status former smoker (1)   ·  Additional History     quit tobacco 2013, prior light \"social\" smoker. Currently working from home. Single parent to two children.   (1)     Lab Results:     ·  " Results          Lab Results   Component Value Date    WBC 8.0 02/05/2024    HGB 14.8 02/05/2024    HCT 43.9 02/05/2024    MCV 98 02/05/2024     02/05/2024       CBC date/time       WBC     HGB     HCT     PLT     Neut      19-Jul-2023 10:08   7.3     13.0    39.2    189     4.05     BMP date/time       NA              K               CL              CO2             BUN             CREAT             19-Jul-2023 10:08   137             3.9             105             N/A             10              0.67     Hepatic date/time   T Pro   T Bili  AST     ALT     ALKP    ALB       23-Oct-2020 10:00   7.3     0.5     N/A     4(L)    40      4.7     LDH date/time       LDH     19-Jul-2023 10:08   122     Radiology Result:     ·  Results         The new and recent brain MRI scans were reviewed with the patient and family:          === 02/05/24 ===    MR BRAIN W AND WO CONTRAST    - Impression -  Similar posttherapy changes, no evidence of progressive disease.    MACRO:  None    Signed by: Estephania Palm 2/5/2024 3:59 PM  Dictation workstation:   [id number]     Impression:     Stable posttherapy changes, no evidence of progressive disease.      MRI Brain w/wo Contrast [Jul 19 2023  3:02PM]        Assessment and Plan:      Assessment and Plan:   Assessment:    41 y/o woman with metastatic melanoma on pembrolizumab who presents for follow up. Course complicated by ? complex seizure and grade I IRAE with lesions  in the brain. Symptoms resolved with prednisone. s/p 2 years of pembrolizumab. Note she has never received BRAF directed therapy.  On surveillance.  We reviewed the patient's most recent labs and imaging showing an enlarging 1 cm lesion in the liver.  Will get MRI of the liver to further evaluate this.  She will see her gynecologist  for the fibroid, cervical/ovarian cysts (has PCOS).  We had a long discussion regarding her keppra.  She is quite adamant that she wants to come off.  She has only had 1 event that  "could be considered possibly a seizure event over 2 years ago.  No further  episodes.  We discussed being very cautious and tapering off of keppra.  She will notify us STAT if she notices any new symptoms.  Will recheck B12 levels as she is on twice weekly B12 (did not respond to lower doses).  Also recheck ACTH as it was a bit  lower on last follow up.  Will refer to Dr. Welch and Estephania Anton.      # Metastatic melanoma  - Completed 2 years of pembrolizumab.  Continue surveillance.  - MRI liver for liver lesion  - Ovarian/cervical cyst as per OBGYN  - MRI brain in 12 weeks  - CT C/A/P in 12 weeks  - CBC, CMP, LDH in 12 weeks.     -Tori has melanoma and a history of brain metastasis; s/p surgical resection and RT.     -She is doing well systemically and is now off of active treatment with Pembro.     -The new brain MRI looks very stable and quiet; no recurrence or new tumors.     -We will continue to follow her off of any active CNS treatment.     -She is to continue close follow-up with Dr. Welch and Team.     -She will complete the taper off of Keppra in the next several weeks -- now doing 500 mg twice a week.     -We will have her return to this clinic in 12 weeks for further evaluation, and to monitor her off of Keppra.     -Prior to the visit she will undergo a new MRI brain at [facility].     -I spent > 40 minutes in face to face consultation to review and discuss the above; 50% of which or more was dedicated to counseling.         Note Recipients: Silvia Sanon MD Craven, MD Matthew - [phone]  Aure Mendoza MD Craven, Paul, MD MANGLA, RAMESH      Select \"Yes\" when ready to send to Provider(s) Listed Above:  Note sent to providers named above          Electronic Signatures:  John Musa)  (Signed 19-Feb-2024 0925 am)        Authored: Patient Visit Information, Cancer History,  History of Present Illness, Review of Systems, Allergies and Outpatient Medication Profile, Problem List, " Social History, Physical Exam, Results, Assessment and Plan, To Send Document via Auto Fax, Attestation

## 2024-04-01 ENCOUNTER — OFFICE VISIT (OUTPATIENT)
Dept: OBSTETRICS AND GYNECOLOGY | Facility: CLINIC | Age: 44
End: 2024-04-01
Payer: COMMERCIAL

## 2024-04-01 VITALS — BODY MASS INDEX: 30.35 KG/M2 | WEIGHT: 199.6 LBS | SYSTOLIC BLOOD PRESSURE: 114 MMHG | DIASTOLIC BLOOD PRESSURE: 70 MMHG

## 2024-04-01 DIAGNOSIS — E53.8 B12 DEFICIENCY: ICD-10-CM

## 2024-04-01 DIAGNOSIS — N95.1 VASOMOTOR SYMPTOMS DUE TO MENOPAUSE: Primary | ICD-10-CM

## 2024-04-01 DIAGNOSIS — Z79.890 HORMONE REPLACEMENT THERAPY: ICD-10-CM

## 2024-04-01 DIAGNOSIS — Z12.31 ENCOUNTER FOR SCREENING MAMMOGRAM FOR MALIGNANT NEOPLASM OF BREAST: ICD-10-CM

## 2024-04-01 DIAGNOSIS — E53.8 VITAMIN B12 DEFICIENCY: ICD-10-CM

## 2024-04-01 PROCEDURE — 99214 OFFICE O/P EST MOD 30 MIN: CPT | Performed by: OBSTETRICS & GYNECOLOGY

## 2024-04-01 RX ORDER — SYRINGE WITH NEEDLE, 1 ML 25GX5/8"
SYRINGE, EMPTY DISPOSABLE MISCELLANEOUS
Qty: 8 EACH | Refills: 11 | Status: SHIPPED | OUTPATIENT
Start: 2024-04-01 | End: 2024-04-09 | Stop reason: SDUPTHER

## 2024-04-01 RX ORDER — PROGESTERONE 100 MG/1
100 CAPSULE ORAL NIGHTLY
Qty: 90 CAPSULE | Refills: 4 | Status: SHIPPED | OUTPATIENT
Start: 2024-04-01

## 2024-04-01 RX ORDER — ESTRADIOL 0.03 MG/D
1 PATCH TRANSDERMAL
Qty: 4 PATCH | Refills: 12 | Status: SHIPPED | OUTPATIENT
Start: 2024-04-01 | End: 2024-05-02 | Stop reason: WASHOUT

## 2024-04-01 RX ORDER — CYANOCOBALAMIN 1000 UG/ML
1000 INJECTION, SOLUTION INTRAMUSCULAR; SUBCUTANEOUS 2 TIMES WEEKLY
Qty: 10 ML | Refills: 0 | Status: SHIPPED | OUTPATIENT
Start: 2024-04-01 | End: 2024-04-09 | Stop reason: SDUPTHER

## 2024-04-02 ENCOUNTER — TELEPHONE (OUTPATIENT)
Dept: HEMATOLOGY/ONCOLOGY | Facility: HOSPITAL | Age: 44
End: 2024-04-02
Payer: COMMERCIAL

## 2024-04-02 DIAGNOSIS — C79.31 MALIGNANT MELANOMA METASTATIC TO BRAIN (MULTI): Primary | ICD-10-CM

## 2024-04-02 NOTE — TELEPHONE ENCOUNTER
Pt left a voicemail on the RN triage line following up on MRI discussed at last visit.  There is no order and she would like to schedule prior to her 5/13 follow up appointment.

## 2024-04-04 NOTE — PROGRESS NOTES
GYN PROGRESS NOTE          Chief complaint: Vasomotor symptoms    HPI:  Patient answers are not available for this visit.  HPI       Annual Exam     Additional comments: Est pt seen cecilye              Comments    Pap 3/4/2021  Edu 2023  Paragard Iud for bc  Pt is having hot flashes primary tried to give her lexapro pt does not want.  Chaperone student            Last edited by Trisha Schneider MA on 2024  8:27 AM.          Reviewed case with patient, reviewed plans.    Reviewed risk-benefit safety profile of combination hormone replacement    ROS:  GEN - no fevers or chills  RESP - no SOB or cough  GYN - see HPI      HISTORY:  Past Medical History:   Diagnosis Date    Lymphedema, not elsewhere classified 02/10/2020    Lymphedema    Malignant melanoma of right upper limb, including shoulder (CMS/HCC) 2016    Melanoma of right upper arm    Malignant melanoma of unspecified upper limb, including shoulder (CMS/HCC) 2020    Melanoma of upper limb    Nontoxic single thyroid nodule 2021    Right thyroid nodule    Other abnormal glucose 06/15/2020    Elevated glucose    Personal history of irradiation     History of radiation therapy    Personal history of malignant melanoma of skin     History of malignant melanoma    Personal history of other diseases of the respiratory system     Personal history of asthma    Personal history of other endocrine, nutritional and metabolic disease     History of hypothyroidism    Personal history of other medical treatment     H/O chest x-ray    Personal history of other specified conditions 10/21/2020    History of weight loss    Secondary malignant neoplasm of brain (CMS/HCC) 2020    Malignant melanoma metastatic to brain    Unspecified asthma with (acute) exacerbation 02/10/2020    Asthma with acute exacerbation     Past Surgical History:   Procedure Laterality Date     SECTION, CLASSIC  2014     Section    CHOLECYSTECTOMY  2014     Cholecystectomy    OTHER SURGICAL HISTORY  07/17/2020    Brain surgery    OTHER SURGICAL HISTORY  08/11/2014    Axillary Lymphadenectomy    OTHER SURGICAL HISTORY  07/07/2014    Excision Of Lesion Arms Malignant    OTHER SURGICAL HISTORY  07/07/2014    Lymphatic Surgery Intraoperative Identification Of [place] Node    OTHER SURGICAL HISTORY  03/10/2015    Laparoscopic Aspiration Left Ovary Cyst    OTHER SURGICAL HISTORY  06/29/2021    Thyroid surgery    OTHER SURGICAL HISTORY  07/20/2021    Thyroidectomy    SENTINEL LYMPH NODE BIOPSY  07/07/2014    [place] Lymph Node Biopsy     Social History     Socioeconomic History    Marital status:      Spouse name: Not on file    Number of children: Not on file    Years of education: Not on file    Highest education level: Not on file   Occupational History    Not on file   Tobacco Use    Smoking status: Never    Smokeless tobacco: Never   Substance and Sexual Activity    Alcohol use: Yes     Comment: social    Drug use: Never    Sexual activity: Yes     Partners: Male     Birth control/protection: I.U.D.   Other Topics Concern    Not on file   Social History Narrative    Not on file     Social Determinants of Health     Financial Resource Strain: Not on file   Food Insecurity: Not on file   Transportation Needs: Not on file   Physical Activity: Not on file   Stress: Not on file   Social Connections: Not on file   Intimate Partner Violence: Not on file   Housing Stability: Not on file     Cancer-related family history is not on file.       PHYSICAL EXAM:  /70   Wt 90.5 kg (199 lb 9.6 oz)   LMP 01/28/2024   BMI 30.35 kg/m²   GEN:  A&O, NAD  HEENT:  head HC/AT, no visible goiter  PSYCH:  normal affect, non-anxious      IMPRESSION/PLAN:  43-year-old vasomotor symptoms    Start combination HRT          Robbin Taylor MD

## 2024-04-06 ENCOUNTER — LAB (OUTPATIENT)
Dept: LAB | Facility: LAB | Age: 44
End: 2024-04-06
Payer: COMMERCIAL

## 2024-04-06 DIAGNOSIS — E53.8 B12 DEFICIENCY: ICD-10-CM

## 2024-04-06 LAB — VIT B12 SERPL-MCNC: 311 PG/ML (ref 211–911)

## 2024-04-06 PROCEDURE — 36415 COLL VENOUS BLD VENIPUNCTURE: CPT

## 2024-04-06 PROCEDURE — 82607 VITAMIN B-12: CPT

## 2024-04-08 ENCOUNTER — PATIENT MESSAGE (OUTPATIENT)
Dept: PRIMARY CARE | Facility: CLINIC | Age: 44
End: 2024-04-08
Payer: COMMERCIAL

## 2024-04-09 DIAGNOSIS — E53.8 VITAMIN B12 DEFICIENCY: ICD-10-CM

## 2024-04-09 RX ORDER — SYRINGE WITH NEEDLE, 1 ML 25GX5/8"
SYRINGE, EMPTY DISPOSABLE MISCELLANEOUS
Qty: 8 EACH | Refills: 11 | Status: SHIPPED | OUTPATIENT
Start: 2024-04-09

## 2024-04-09 RX ORDER — CYANOCOBALAMIN 1000 UG/ML
1000 INJECTION, SOLUTION INTRAMUSCULAR; SUBCUTANEOUS 2 TIMES WEEKLY
Qty: 10 ML | Refills: 0 | Status: SHIPPED | OUTPATIENT
Start: 2024-04-11 | End: 2025-04-11

## 2024-04-29 ENCOUNTER — HOSPITAL ENCOUNTER (OUTPATIENT)
Dept: RADIOLOGY | Facility: CLINIC | Age: 44
Discharge: HOME | End: 2024-04-29
Payer: COMMERCIAL

## 2024-04-29 DIAGNOSIS — C79.31 MALIGNANT MELANOMA METASTATIC TO BRAIN (MULTI): ICD-10-CM

## 2024-04-29 DIAGNOSIS — C79.31 MALIGNANT NEOPLASM METASTATIC TO BRAIN (MULTI): ICD-10-CM

## 2024-04-29 PROCEDURE — 70553 MRI BRAIN STEM W/O & W/DYE: CPT

## 2024-04-29 PROCEDURE — 74177 CT ABD & PELVIS W/CONTRAST: CPT | Performed by: RADIOLOGY

## 2024-04-29 PROCEDURE — 2550000001 HC RX 255 CONTRASTS: Performed by: PSYCHIATRY & NEUROLOGY

## 2024-04-29 PROCEDURE — A9575 INJ GADOTERATE MEGLUMI 0.1ML: HCPCS | Performed by: PSYCHIATRY & NEUROLOGY

## 2024-04-29 PROCEDURE — 2550000001 HC RX 255 CONTRASTS: Performed by: NURSE PRACTITIONER

## 2024-04-29 PROCEDURE — 74177 CT ABD & PELVIS W/CONTRAST: CPT

## 2024-04-29 PROCEDURE — 71260 CT THORAX DX C+: CPT | Performed by: RADIOLOGY

## 2024-04-29 PROCEDURE — 70553 MRI BRAIN STEM W/O & W/DYE: CPT | Performed by: RADIOLOGY

## 2024-04-29 RX ORDER — GADOTERATE MEGLUMINE 376.9 MG/ML
18 INJECTION INTRAVENOUS
Status: COMPLETED | OUTPATIENT
Start: 2024-04-29 | End: 2024-04-29

## 2024-04-29 RX ADMIN — IOHEXOL 75 ML: 350 INJECTION, SOLUTION INTRAVENOUS at 10:33

## 2024-04-29 RX ADMIN — GADOTERATE MEGLUMINE 18 ML: 376.9 INJECTION INTRAVENOUS at 10:54

## 2024-05-02 ENCOUNTER — TELEMEDICINE (OUTPATIENT)
Dept: OBSTETRICS AND GYNECOLOGY | Facility: CLINIC | Age: 44
End: 2024-05-02
Payer: COMMERCIAL

## 2024-05-02 DIAGNOSIS — R23.2 VASOMOTOR FLUSHING: Primary | ICD-10-CM

## 2024-05-02 PROCEDURE — 99442 PR PHYS/QHP TELEPHONE EVALUATION 11-20 MIN: CPT | Performed by: OBSTETRICS & GYNECOLOGY

## 2024-05-02 RX ORDER — ESTRADIOL 0.05 MG/D
1 PATCH TRANSDERMAL
Qty: 12 PATCH | Refills: 3 | Status: SHIPPED | OUTPATIENT
Start: 2024-05-05 | End: 2024-05-06 | Stop reason: SDUPTHER

## 2024-05-02 NOTE — PROGRESS NOTES
GYN PROGRESS NOTE    Virtual or Telephone Consent    A telephone visit (audio only) between the patient (at the originating site) and the provider (at the distant site) was utilized to provide this telehealth service.   Verbal consent was requested and obtained from Tori Taylor on this date, 05/02/24 for a telehealth visit.   11 minute visit    Chief complaint: follow up    HPI:  Patient answers are not available for this visit.  - Her vasomotor symptoms have not improved and may be slightly worse.  - Noted for the last 2 weeks she has been experiencing leg cramping which she thought may have been due to dehydration but has since improved.      Reviewed case with patient, reviewed plans.    Recommend increasing the Estradiol weekly patch.    Her mammogram is due in the next year.      HISTORY:  Past Medical History:   Diagnosis Date    Lymphedema, not elsewhere classified 02/10/2020    Lymphedema    Malignant melanoma of right upper limb, including shoulder (Multi) 06/27/2016    Melanoma of right upper arm    Malignant melanoma of unspecified upper limb, including shoulder (Multi) 04/17/2020    Melanoma of upper limb    Nontoxic single thyroid nodule 05/12/2021    Right thyroid nodule    Other abnormal glucose 06/15/2020    Elevated glucose    Personal history of irradiation     History of radiation therapy    Personal history of malignant melanoma of skin     History of malignant melanoma    Personal history of other diseases of the respiratory system     Personal history of asthma    Personal history of other endocrine, nutritional and metabolic disease     History of hypothyroidism    Personal history of other medical treatment     H/O chest x-ray    Personal history of other specified conditions 10/21/2020    History of weight loss    Secondary malignant neoplasm of brain (Multi) 09/18/2020    Malignant melanoma metastatic to brain    Unspecified asthma with (acute) exacerbation ([facility]-H[facility]) 02/10/2020     Asthma with acute exacerbation     Past Surgical History:   Procedure Laterality Date     SECTION, CLASSIC  2014     Section    CHOLECYSTECTOMY  2014    Cholecystectomy    OTHER SURGICAL HISTORY  2020    Brain surgery    OTHER SURGICAL HISTORY  2014    Axillary Lymphadenectomy    OTHER SURGICAL HISTORY  2014    Excision Of Lesion Arms Malignant    OTHER SURGICAL HISTORY  2014    Lymphatic Surgery Intraoperative Identification Of [place] Node    OTHER SURGICAL HISTORY  03/10/2015    Laparoscopic Aspiration Left Ovary Cyst    OTHER SURGICAL HISTORY  2021    Thyroid surgery    OTHER SURGICAL HISTORY  2021    Thyroidectomy    SENTINEL LYMPH NODE BIOPSY  2014    [place] Lymph Node Biopsy     Social History     Socioeconomic History    Marital status:      Spouse name: Not on file    Number of children: Not on file    Years of education: Not on file    Highest education level: Not on file   Occupational History    Not on file   Tobacco Use    Smoking status: Never    Smokeless tobacco: Never   Substance and Sexual Activity    Alcohol use: Yes     Comment: social    Drug use: Never    Sexual activity: Yes     Partners: Male     Birth control/protection: I.U.D.   Other Topics Concern    Not on file   Social History Narrative    Not on file     Social Determinants of Health     Financial Resource Strain: Not on file   Food Insecurity: Not on file   Transportation Needs: Not on file   Physical Activity: Not on file   Stress: Not on file   Social Connections: Not on file   Intimate Partner Violence: Not on file   Housing Stability: Not on file     Cancer-related family history is not on file.       IMPRESSION/PLAN:  43-year-old vasomotor flushing.    - estradiol ([place]) 0.05 mg/24 hr patch    Follow up in 1 month    Jose LEROY am scribing for virtually, and in the presence of Dr. Robbin Taylor on  24 at 4:01 PM  Robbin OSORIO  MD [place]

## 2024-05-06 ENCOUNTER — APPOINTMENT (OUTPATIENT)
Dept: RADIOLOGY | Facility: CLINIC | Age: 44
End: 2024-05-06
Payer: COMMERCIAL

## 2024-05-06 DIAGNOSIS — R23.2 VASOMOTOR FLUSHING: ICD-10-CM

## 2024-05-06 RX ORDER — ESTRADIOL 0.05 MG/D
1 PATCH TRANSDERMAL
Qty: 8 PATCH | Refills: 6 | Status: SHIPPED | OUTPATIENT
Start: 2024-05-12 | End: 2025-05-12

## 2024-05-06 NOTE — TELEPHONE ENCOUNTER
Adhesive reaction to new patch.  [facility] had brand that she was previously using at the new dose.  Therefore, she is switching pharmacies.  She plans to pay OOP; needs an 8 patch box for reduced pricing.  Reviewed and approved by WESTON CHIU on 5/6/24 at 3:48 PM.

## 2024-05-08 ENCOUNTER — APPOINTMENT (OUTPATIENT)
Dept: RADIOLOGY | Facility: CLINIC | Age: 44
End: 2024-05-08
Payer: COMMERCIAL

## 2024-05-09 ENCOUNTER — OFFICE VISIT (OUTPATIENT)
Dept: HEMATOLOGY/ONCOLOGY | Facility: CLINIC | Age: 44
End: 2024-05-09
Payer: COMMERCIAL

## 2024-05-09 ENCOUNTER — LAB (OUTPATIENT)
Dept: LAB | Facility: CLINIC | Age: 44
End: 2024-05-09
Payer: COMMERCIAL

## 2024-05-09 VITALS
TEMPERATURE: 97.5 F | BODY MASS INDEX: 30.56 KG/M2 | DIASTOLIC BLOOD PRESSURE: 72 MMHG | RESPIRATION RATE: 16 BRPM | OXYGEN SATURATION: 99 % | SYSTOLIC BLOOD PRESSURE: 107 MMHG | HEART RATE: 57 BPM | WEIGHT: 201 LBS

## 2024-05-09 DIAGNOSIS — C43.9 METASTATIC MELANOMA (MULTI): Primary | ICD-10-CM

## 2024-05-09 DIAGNOSIS — C79.31 MALIGNANT NEOPLASM METASTATIC TO BRAIN (MULTI): ICD-10-CM

## 2024-05-09 DIAGNOSIS — C43.9 METASTATIC MELANOMA (MULTI): ICD-10-CM

## 2024-05-09 DIAGNOSIS — C77.9 MALIGNANT MELANOMA METASTATIC TO LYMPH NODE (MULTI): ICD-10-CM

## 2024-05-09 LAB
APPEARANCE UR: CLEAR
BILIRUB UR STRIP.AUTO-MCNC: NEGATIVE MG/DL
COLOR UR: COLORLESS
GLUCOSE UR STRIP.AUTO-MCNC: NORMAL MG/DL
KETONES UR STRIP.AUTO-MCNC: NEGATIVE MG/DL
LEUKOCYTE ESTERASE UR QL STRIP.AUTO: ABNORMAL
NITRITE UR QL STRIP.AUTO: NEGATIVE
PH UR STRIP.AUTO: 7 [PH]
PROT UR STRIP.AUTO-MCNC: NEGATIVE MG/DL
RBC # UR STRIP.AUTO: NEGATIVE /UL
RBC #/AREA URNS AUTO: NORMAL /HPF
SP GR UR STRIP.AUTO: 1.01
SQUAMOUS #/AREA URNS AUTO: NORMAL /HPF
UROBILINOGEN UR STRIP.AUTO-MCNC: NORMAL MG/DL
WBC #/AREA URNS AUTO: NORMAL /HPF

## 2024-05-09 PROCEDURE — 81001 URINALYSIS AUTO W/SCOPE: CPT

## 2024-05-09 PROCEDURE — 99215 OFFICE O/P EST HI 40 MIN: CPT | Performed by: NURSE PRACTITIONER

## 2024-05-09 ASSESSMENT — ENCOUNTER SYMPTOMS
HEMATOLOGIC/LYMPHATIC NEGATIVE: 1
EYES NEGATIVE: 1
PSYCHIATRIC NEGATIVE: 1
ENDOCRINE NEGATIVE: 1
GASTROINTESTINAL NEGATIVE: 1
CARDIOVASCULAR NEGATIVE: 1
MUSCULOSKELETAL NEGATIVE: 1
NEUROLOGICAL NEGATIVE: 1
CONSTITUTIONAL NEGATIVE: 1
RESPIRATORY NEGATIVE: 1

## 2024-05-09 ASSESSMENT — PAIN SCALES - GENERAL: PAINLEVEL: 0-NO PAIN

## 2024-05-09 NOTE — PROGRESS NOTES
.Patient ID: Tori Templeton is a 43 y.o. female.  Referring Physician: Estephania Anton, APRN-CNP  [id number] [place] [address]  Primary Care Provider: Silvia Sanon MD     Chief Complaint   Patient presents with    Follow-up     HPI  Cancer History:          Skin - Melanoma         AJCC Edition: 7th, Diagnosis Date: 16-Jun-2014, Stage III, T1a N1a M0          Melanoma of the Skin         AJCC Edition: 8th (AJCC), Diagnosis Date: 28-Jun-2020, Stage IV, pTX cN1 cM1d(1)      Treatment Synopsis:    Ms. TORI TEMPLETON is diagnosed with stage 3 melanoma in 2014, treated by Dr. Mariano Brown with adjuvant Interferon therapy. 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 Detailed history as below (recorded by Dr. Steve Vega).  - History of metastatic melanoma diagnosed in 2014 s/p local excision of right forearm with partial thickness flap and axillary LND s/p 10 months of adjuvant interferon alpha therapy, stopped due to thyroiditis.  - Solitary brain metastasis left parietal lobe, status post left-sided craniotomy for hemorrhagic tumor resection and duraplasty with pericranial graft by Dr. Ruth Veliz on  6/26/2020.  - Completed adjuvant SBRT therapy post-resection site by Dr. Leticia Mendoza--dates 7/27-31/2020  - Pending PET/CT for 4 mm solitary RUL pulmonary nodule, sub cm liver lesion, and enlarged left adrenal gland.  - 8/7/2020:  Discussion for immunotherapy vs. 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  - 8/21/2020: Discussed results of 8/13/2020 PET/CT, discuss immunotherapy options after tumor board meeting 8/17/2020.  Consent for Pembrolizumab.  - Patient received 5 Pembrolizumab. infusions since September 2020.  Hospitalized for confusion episode concerning for focal seizure.  MRI more suggesting of immunotherapy flare rather than progression of disease (missed last 2 doses due to  "symptoms).  - Plan to continue Keppra and resumed Pembrolizumab 2/1/2021  - Restaging scans 5/2022 with ALMA in brain or PET CT C/A/P  - Completed 2 years of Pembrolizumab, restaging  - Followed with serial imaging.  - 7/2023: CT C/A/P with an enlarging 1 cm liver lesion, uncertain etiology.  Stable pulmonary nodule.  Cervical cyst for which she will see gynecology.  No changes in MRI brain.  - 7/29/23: MRI Liver does not show any pathologic lesion.      She established care with our medical oncology team (Dr. Wilmer Welch) on 8/10/23.      Treatment History:    2014-7-3: Cancer Related Surgery: OPERATION/PROCEDURE:    Wide excision malignant melanoma of right arm, 10 x 5 cm skin      excision.  Full-thickness skin graft to right arm skin and soft tissue      defect, 6 x 3 cm.  Right axillary sentinel lymph node biopsy.  2014-7-3: Cancer Related Surgery: Wide local excision and sentinel lymph node biopsy with 1 of 2 nodes positive for metastatic disease  2014-7-3: Cancer Related Surgery: Right axillary raji dissection with 0/42 nodes with evidence of disease  2014-9-2: Chemotherapy: commenced adjuvant high dose IFN alpha 2B     2020-9-14: New Treatment Plan: First dose Pembrolizumab 200mg IV every 3 weeks (5 doses, then missed 2 doses for concern of new findings on MRI)--Resumed #6 2/1/2021. Last dose 09/2022.    Subjective   Please refer to \"Notes/Cancer History\" above for complete History of present illness.     Ms. Tori Taylor     - Presents to clinic alone.  - Feels well overall.  - No constitutional symptoms.  - Started on HRT for menopausal symptoms.   - Continues to follow closely with derm for skin checks.   - Met with Dr. Musa. Weaned off Keppra and started driving again.   - Considering moving to [place] to be closer to her kids when her daughter attends college in the fall.      Review of Systems:   Review of Systems   Constitutional: Negative.    HENT:  Negative.     Eyes: Negative.  "   Respiratory: Negative.     Cardiovascular: Negative.    Gastrointestinal: Negative.    Endocrine: Negative.    Genitourinary: Negative.     Musculoskeletal: Negative.         Mild lymphedema right arm since LND. Wears compression garment as needed   Skin: Negative.    Neurological: Negative.    Hematological: Negative.    Psychiatric/Behavioral: Negative.           MEDICAL HISTORY  Past Medical History:   Diagnosis Date    Lymphedema, not elsewhere classified 02/10/2020    Lymphedema    Malignant melanoma of right upper limb, including shoulder (Multi) 06/27/2016    Melanoma of right upper arm    Malignant melanoma of unspecified upper limb, including shoulder (Multi) 04/17/2020    Melanoma of upper limb    Nontoxic single thyroid nodule 05/12/2021    Right thyroid nodule    Other abnormal glucose 06/15/2020    Elevated glucose    Personal history of irradiation     History of radiation therapy    Personal history of malignant melanoma of skin     History of malignant melanoma    Personal history of other diseases of the respiratory system     Personal history of asthma    Personal history of other endocrine, nutritional and metabolic disease     History of hypothyroidism    Personal history of other medical treatment     H/O chest x-ray    Personal history of other specified conditions 10/21/2020    History of weight loss    Secondary malignant neoplasm of brain (Multi) 09/18/2020    Malignant melanoma metastatic to brain    Unspecified asthma with (acute) exacerbation ([facility]) 02/10/2020    Asthma with acute exacerbation       FAMILY HISTORY  Family History   Problem Relation Name Age of Onset    Deep vein thrombosis Mother  40    Diabetes Father         TOBACCO HISTORY  Tobacco Use: Low Risk  (4/1/2024)    Patient History     Smoking Tobacco Use: Never     Smokeless Tobacco Use: Never     Passive Exposure: Not on file       SOCIAL HISTORY  Social Connections: Not on file   , has a son and daughter.  "Works full time for the [employer] and [employer] Service      Outpatient Medication Profile:  Current Outpatient Medications on File Prior to Visit   Medication Sig Dispense Refill    acetaminophen (Tylenol) 500 mg tablet Take 1 tablet (500 mg) by mouth.      albuterol 90 mcg/actuation inhaler Inhale 2 puffs 4 times a day. 18 g 3    BD Luer-Juliana Syringe 3 mL 25 gauge x 1\" syringe USE 1 SYRINGE TWICE WEEKLY FOR B12 INJECTIONS 8 each 11    cholecalciferol (Vitamin D-3) 5,000 Units tablet Take 1 tablet (5,000 Units) by mouth once daily.      copper (ParaGard T 380A) 380 square mm IUD by intrauterine route.      cyanocobalamin (Vitamin B-12) 1,000 mcg/mL injection Inject 1 mL (1,000 mcg) into the muscle 2 times a week. 10 mL 0    [START ON 5/12/2024] estradiol (Climara) 0.05 mg/24 hr patch Place 1 patch over 7 days on the skin 1 (one) time per week. 8 patch 6    fexofenadine (Allegra) 180 mg tablet Take 1 tablet (180 mg) by mouth once daily as needed.      mometasone (Nasonex) 50 mcg/actuation nasal spray Administer 1 spray into affected nostril(s) once daily.      progesterone (Prometrium) 100 mg capsule Take 1 capsule (100 mg) by mouth once daily at bedtime. 90 capsule 4    Keppra 500 mg tablet Take 1 tablet (500 mg) by mouth 2 times a day.      [DISCONTINUED] estradiol (Climara) 0.05 mg/24 hr patch Place 1 patch over 7 days on the skin 1 (one) time per week. 12 patch 3     No current facility-administered medications on file prior to visit.         Performance Status:  Asymptomatic     Vitals and Measurements:   /72 (BP Location: Left arm, Patient Position: Sitting)   Pulse 57   Temp 36.4 °C (97.5 °F) (Temporal)   Resp 16   Wt 91.2 kg (201 lb)   SpO2 99%   BMI 30.56 kg/m²       Physical Exam:   Physical Exam  Constitutional:       Appearance: Normal appearance.   HENT:      Head: Normocephalic and atraumatic.      Nose: Nose normal.      Mouth/Throat:      Mouth: Mucous membranes are moist.      " Pharynx: Oropharynx is clear.   Eyes:      Conjunctiva/sclera: Conjunctivae normal.   Cardiovascular:      Rate and Rhythm: Normal rate and regular rhythm.      Pulses: Normal pulses.      Heart sounds: Normal heart sounds.   Pulmonary:      Effort: Pulmonary effort is normal.      Breath sounds: Normal breath sounds.   Abdominal:      General: Bowel sounds are normal.      Palpations: Abdomen is soft.   Musculoskeletal:         General: Normal range of motion.      Cervical back: Neck supple.   Skin:     General: Skin is warm and dry.   Neurological:      General: No focal deficit present.      Mental Status: She is alert and oriented to person, place, and time.   Psychiatric:         Mood and Affect: Mood normal.         Behavior: Behavior normal.      Lab Results:  I have reviewed these laboratory results:     Lab Results   Component Value Date    WBC 8.0 02/05/2024    HGB 14.8 02/05/2024    HCT 43.9 02/05/2024    MCV 98 02/05/2024     02/05/2024         Chemistry    Lab Results   Component Value Date/Time     02/05/2024 1051    K 4.1 02/05/2024 1051     02/05/2024 1051    CO2 29 02/05/2024 1051    BUN 17 02/05/2024 1051    CREATININE 0.73 02/05/2024 1051    Lab Results   Component Value Date/Time    CALCIUM 9.7 02/05/2024 1051    ALKPHOS 41 02/05/2024 1051    AST 13 02/05/2024 1051    ALT 9 02/05/2024 1051    BILITOT 0.4 02/05/2024 1051        Lab Results   Component Value Date    TSH 2.47 11/02/2023    THYROIDPAB 29 09/07/2021        Radiology Result:  I have reviewed the latest Imaging in PACS and the findings are noted in this note. I discussed the results of the latest imaging with the patient. All previous imaging were reviewed at the time it was completed. Full records are available in the EMR for review as well.     STUDY:  MR BRAIN W AND WO IV CONTRAST;  4/29/2024 10:53 am    IMPRESSION:  *Postoperative changes as described  *there is no evidence of mass, infarction or  hemorrhage.  =============================================  CT CHEST ABDOMEN PELVIS W IV CONTRAST; 4/29/2024 9:45 am     IMPRESSION:  CHEST:  1.  Stable CT appearance of the chest. Status post right axillary  lymph node dissection stable in appearance. Unchanged subcentimeter  lung nodules and nodular densities as above. Unchanged anterior  mediastinal soft tissue density likely residual or hyperplastic  thymic tissue. Continued attention on follow-up imaging recommended.  2. Additional findings as above.      ABDOMEN-PELVIS:  1.  Multiple unchanged subcentimeter liver lesions as described  including 8 mm heterogeneous focus inferior right hepatic lobe which  could represent hemangioma as noted on prior MRI 07/29/2023, however  incompletely characterized. There is also redemonstration apparent  contour deformity with associated hypoattenuation posterior right  hepatic lobe as described of uncertain significance but stable in  appearance. Continued attention on follow-up imaging recommended.  Consider follow-up MRI for further assessment and comparison with  prior study.  2. Unchanged low-density right adrenal nodule most consistent with  adenoma.  3. Mild nonspecific urinary bladder wall thickening.  4. Trace free pelvic fluid similar to prior.  5. Additional findings as above.      Pathology Results:  I have reviewed the full pathology report recorded in the EMR. The pertinent portions indicating diagnosis are listed here in the note. for details please refer to the full report recorded in the EMR.    Surgical Pathology [Jul 10 2020 6:44PM] ([id number])  Specimens: LEFT BRAIN MASS /Received fresh for intraoperative consultation, labeled with the patient's   Name KADE TEMPLETON     Accession #: [id number]   Pathologist: BREE CAMARGO MD   Date of Procedure: 6/26/2020    Date Received: 6/26/2020   Date Reported 7/1/2020   Submitting Physician: WESTON OBREGON MD   Location: OR Other External #      FINAL DIAGNOSIS   A. LEFT BRAIN MASS, REMOVAL:   --METASTATIC MALIGNANT MELANOMA.      Note: SOX10 immunoreactivity is identified within tumor cell nuclei.     The gross and/or microscopic findings were reviewed in conjunction with pathology resident, Cheng Castro MD.      Assessment and Plan:   Assessment/Plan   Ms. Tori Taylor is a 43 y.o. female with a diagnosis of stage 4 melanoma. She was diagnosed with stage 4 melanoma with solitary metastasis to the brain which was resected and radiated (SBRT) and then she received 2 years of Keytruda (last 09/2022) with no recurrence to date. Please see the evolution of the case listed above in the cancer history.     We discussed that we will be conducting CT scan every 3 months till Sept 2024 and then every 6 months till Sept 2027 and then once a year till Sept 2032. Of course, this frequency will change according to the symptoms of the patient. MRI brain will be  ordered according to the same rule.     # Melanoma  - Currently on surveillance.   - CT CAP 04/29/2024 showing stable changes.   - RTC 08/22/2024 at [place].  - CT scan and labs prior on 08/19/2024.   - Continue skin checks with dermatology. Follows with [facility] in [place].    # Hx brain mets  - Follows with Dr. Musa in neuro oncology. Next visit 05/13/2024.  - Stable brain MRI 04/29/2024.    # Mild bladder wall thickening on CT scan   - UA today to rule out infection.     # Health maintenance  - Continues to follow with her PCP for wellness visits and age appropriate cancer screenings.   - Continue to follow with OBGYN.      DISCLAIMER:   In preparing for this visit and writing this note, I reviewed all the previous electronic medical records (labs, imaging and medical charts) of the patient available in the physician portal. Significant findings which helped in decision making are recorded  in this chart.     The plan was discussed with the patient. We gave her ample opportunities to  ask questions. All questions were answered to her satisfaction and she verbalized understanding.

## 2024-05-09 NOTE — PATIENT INSTRUCTIONS
Ms. Taylor,   It was a pleasure meeting you today.     As discussed we will be conducting surveillance CT scans in 3 months on 08/19/2024. We will meet again in clinic on 08/22/2024. Please keep your appointments with neuro oncology, OBGYN and your PCP.      Our offices will be reaching out to you to make all the appointments. I am always available at the phone numbers listed below for an earlier appointment should you wish one.     In case of an emergency please dial 911 or report to your nearest Emergency Room.  For all other questions, please do not hesitate to reach out to us at the number listed below.     Thank you for choosing [facility].   We appreciate your visit.      MD Estephania Coello APRN     Sarcoma and Cutaneous Oncology team     Phone: [phone]  Fax: [phone].

## 2024-05-13 ENCOUNTER — OFFICE VISIT (OUTPATIENT)
Dept: HEMATOLOGY/ONCOLOGY | Facility: HOSPITAL | Age: 44
End: 2024-05-13
Payer: COMMERCIAL

## 2024-05-13 VITALS
OXYGEN SATURATION: 99 % | DIASTOLIC BLOOD PRESSURE: 80 MMHG | BODY MASS INDEX: 30.43 KG/M2 | HEART RATE: 61 BPM | WEIGHT: 200.1 LBS | TEMPERATURE: 96.1 F | SYSTOLIC BLOOD PRESSURE: 119 MMHG | RESPIRATION RATE: 16 BRPM

## 2024-05-13 DIAGNOSIS — C79.31 MALIGNANT MELANOMA METASTATIC TO BRAIN (MULTI): ICD-10-CM

## 2024-05-13 PROCEDURE — 99215 OFFICE O/P EST HI 40 MIN: CPT | Performed by: PSYCHIATRY & NEUROLOGY

## 2024-05-13 ASSESSMENT — PAIN SCALES - GENERAL: PAINLEVEL: 0-NO PAIN

## 2024-05-13 NOTE — PATIENT INSTRUCTIONS
Your next appointment with Dr. Musa is in 12 weeks.   You will have an MRI prior to that visit.  Please call us with any questions or concerns at [phone] opt. 5, opt. 2  For scheduling concerns please call [phone] option 1

## 2024-05-13 NOTE — PROGRESS NOTES
Patient Visit Information:   Visit Type: Virtual Visit:  An interactive audio  and video telecommunication system which permits real time communications between the patient (at the originating site) and provider (at the distant site) was utilized to provide this telehealth service.   Verbal Consent for Encounter: Verbal consent was  requested and obtained from patient, or from parent/guardian if minor, on this date for a telehealth visit.      Cancer History:          Skin - Melanoma         AJCC Edition: 7th, Diagnosis Date: 16-Jun-2014, Stage III, T1a N1a M0          Melanoma of the Skin         AJCC Edition: 8th (AJCC), Diagnosis Date: 28-Jun-2020, Stage IV, pTX cN1 cM1d(1)      Treatment Synopsis:    --History of metastatic melanoma diagnosed in 2014 s/p local excision of right forearm with partial thickness flap and axillary LND s/p 10 months  of adjuvant interferon alpha therapy, stopped due to thyroiditis.  --Solitary brain metastasis left parietal lobe, status post left-sided craniotomy for hemorrhagic tumor resection and duraplasty with pericranial graft by Dr. Ruth Veliz  on 6/26/2020.  --Completed adjuvant SBRT therapy post-resection site by Dr. Leticia Mendoza--dates 7/27-31/2020  --Pending PET/CT for 4 mm solitary RUL pulmonary nodule, sub cm liver lesion, and enlarged left adrenal gland.  --8/7/2020:  Discussion for immunotherapy vs. 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  --8/21/2020: Discussed results of 8/13/2020 PET/CT, discuss immunotherapy options after tumor board meeting 8/17/2020.  Consent for Pembrolizumab.  --Patient received 5 Pembrolizumab. infusions since September 2020.  Hospitalized for confusion episode concerning for focal seizure.  MRI more  suggesting of immunotherapy flare rather than progression of disease (missed last 2 doses due to symptoms).  --Plan to continue Keppra and resumed Pembrolizumab 2/1/2021  --Restaging scans 5/2022 with ALMA in brain or PET CT  "C/A/P  --Completed 2 years of Pembrolizumab, restaging  --Followed with serial imaging.  -- 7/2023: CT C/A/P with an enlarging 1 cm liver lesion, uncertain etiology.  Stable pulmonary nodule.  Cervical cyst for which she will see gynecology.  No changes in MRI brain.        Treatment History:    2014-7-3: Cancer Related Surgery: OPERATION/PROCEDURE:    Wide excision malignant melanoma of right arm, 10 x 5 cm skin      excision.  Full-thickness skin graft to right arm skin and soft tissue      defect, 6 x 3 cm.  Right axillary sentinel lymph node biopsy.  2014-7-3: Cancer Related Surgery: Wide local excision and sentinel lymph node biopsy with 1 of 2 nodes positive for metastatic disease  2014-7-3: Cancer Related Surgery: Right axillary raji dissection with 0/42 nodes with evidence of disease  2014-9-2: Chemotherapy: commenced adjuvant high dose IFN alpha 2B     2020-9-14: New Treatment Plan: First dose Pembrolizumab 200mg IV every 3 weeks (5 doses, then missed 2 doses for concern of new findings on MRI)--Resumed #6 2/1/2021     History of Present Illness:      ID Statement:    KADE TEMPLETON is a 42 year old Female        Chief Complaint: Follow up of cutaneous melanoma   Interval History:    Neuro-Oncology History and Physical  Diagnosis: Cutaneous melanoma  Staging:  IV  KPS:  90  Molecular: BRAF V 600E +     1/5/21: This is a 41-year-old woman who presents for evaluation after an episode of altered awareness occurring on 12/6/2020.  She states that the episode initially started with flashing lights out  of her right eye, did not notice any out of her left eye, which was followed by an extended period of approximately 45 minutes of confusion. She also experienced confusion which she described as brain \"fogginess\". She slowly returned to her baseline.  She was seen in the emergency department and CT head done at that time did not demonstrate any acute changes. A follow-up MRI occurring on 12/16/2020 " demonstrated 3 new ovoid enhancing foci with flair hyperintensity within the brain parenchyma just deep  to the resection cavity. These were felt to potentially represent therapy effect however metastases were not excluded. She was discussed at tumor board and recommended for advanced imaging and EEG. A spot EEG was done and did not demonstrate any epileptiform  discharges. Advanced imaging is still pending. She presents here for further evaluation.     Since her episode the patient has not noticed any new neurological symptoms. She has not had any recurrence of her visual changes. 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 No weakness numbness headaches or speech difficulties. Her pembrolizumab is currently on hold per Dr. Caceres.     4/4/23: The patient is present virtually for follow up. She is feeling well.  Lost weight intentionally.  No significant complaints.  Asking about  tapering off of Keppra.     INTERVAL HISTORY (5/13/2024): Since the last visit, she continues to do very well neurologically. She denies any headaches, seizures, speech issues, visual loss, new focal weakness, gait difficulty, or sensory loss. She remains off of Pembrolizumab and is followed by Dr. Welch for the melanoma; new CT scans pending for August. Since she never had a verified seizure in the past, and the recent seizure work-up was negative, she has been tapering off of the Keppra -- totally discontinued in early April. She continues to work full time from home as an  for the [employer] in [facility].      Review of Systems:   Review of Systems:    12 systems were reviewed and are negative except for as described above.        Allergies and Intolerances:       Allergies:         penicillins: Drug Category, Anaphylaxis, Active         Tape - Adhesive, Bandaids, Paper: Environment, Rash, Active          "penicillin: Drug, Unknown, Active         Xopenex: Drug, Unknown, Active         codeine: Drug, Unknown, Active         sulfa drugs: Drug Category, Unknown, Active     Outpatient Medication Profile:  * Patient Currently Takes Medications as of 20-Dec-2022 13:38 documented in Structured Notes         Keppra 500 mg oral tablet : 1 tab(s) orally 2 times a day , Start Date: 29-Aug-2022         prochlorperazine 10 mg oral tablet: 1 tab(s) orally every 8 hours, As  Needed -for nausea and vomiting , Start Date: 15-Feb-2022         ferrous sulfate 325 mg (65 mg elemental iron) oral delayed release tablet : 1 tab(s) orally every other day , Start Date: 28-Apr-2021         acetaminophen 325 mg oral tablet: 2 tab(s) orally every 6 hours, As needed,  Pain - Mild (1-3), Start Date: 28-Jun-2020         Lymphedema sleeve: Right Arm          Fit and Dispense         20- 30 mm Hg         I89.0         , Start Date: 05-Aug-2019         Vitamin D3 5000 intl units oral capsule: 1 cap(s) orally once a day         Allegra 24 Hour Allergy oral tablet: 1 tab(s) orally once a day         Vitamin B12 1000 mcg/mL injectable solution: injectable twice a week             Medical History:         Bilateral leg paresthesia: ICD-10: R20.2, Status: Active         Melanoma metastatic to brain: ICD-10: C79.31, Status: Active         History of thyroiditis: ICD-10: Z86.39, Status: Active         Malignant melanoma: ICD-10: C43.9, Status: Active         MALIG MELANOMA ARM (172.6): Status: Active, Description: Selected  in Cancer Staging Date of Diagnosis: 16-Jun-2014                  Copy From Visit ID: [id number] Problem List Type: Additional Dx         Abdominal pain: ICD-10: R10.9, Status: Active        Social History:   Social Substance History:  ·  Smoking Status former smoker (1)   ·  Additional History     quit tobacco 2013, prior light \"social\" smoker. Currently working from home. Single parent to two children.   (1)     Lab Results:     ·  " Results          Lab Results   Component Value Date    WBC 8.0 02/05/2024    HGB 14.8 02/05/2024    HCT 43.9 02/05/2024    MCV 98 02/05/2024     02/05/2024        Radiology Result:     ·  Results         The new and recent brain MRI scans were reviewed with the patient and family:       === 04/29/24 ===    MR BRAIN W AND WO CONTRAST    - Impression -  *Postoperative changes as described  *there is no evidence of mass, infarction or hemorrhage.    MACRO:  none    Signed by: Ray Asencio 4/29/2024 11:32 AM  Dictation workstation:   [id number]       === 02/05/24 ===    MR BRAIN W AND WO CONTRAST    - Impression -  Similar posttherapy changes, no evidence of progressive disease.    MACRO:  None    Signed by: Estephania Palm 2/5/2024 3:59 PM  Dictation workstation:   [id number]     Assessment and Plan:      Assessment and Plan:   Assessment:    41 y/o woman with metastatic melanoma on pembrolizumab who presents for follow up. Course complicated by ? complex seizure and grade I IRAE with lesions  in the brain. Symptoms resolved with prednisone. s/p 2 years of pembrolizumab. Note she has never received BRAF directed therapy.  On surveillance.  We reviewed the patient's most recent labs and imaging showing an enlarging 1 cm lesion in the liver.  Will get MRI of the liver to further evaluate this.  She will see her gynecologist  for the fibroid, cervical/ovarian cysts (has PCOS).  We had a long discussion regarding her keppra.  She is quite adamant that she wants to come off.  She has only had 1 event that could be considered possibly a seizure event over 2 years ago.  No further  episodes.  We discussed being very cautious and tapering off of keppra.  She will notify us STAT if she notices any new symptoms.  Will recheck B12 levels as she is on twice weekly B12 (did not respond to lower doses).  Also recheck ACTH as it was a bit  lower on last follow up.  Will refer to Dr. Welch and Estephania Anton.      #  Metastatic melanoma  - Completed 2 years of pembrolizumab.  Continue surveillance.      -Tori has melanoma and a history of brain metastasis; s/p surgical resection and RT.     -She is doing well systemically and is now off of active treatment with Pembro.     -The new brain MRI looks very stable; no recurrence or new tumors.     -We will continue to follow her off of any active CNS treatment.     -She is to continue close follow-up with Dr. Welch and Team.     -She has completed the taper off of Keppra in early April; no seizures or auras.     -We will have her return to this clinic in 12 weeks for further evaluation, and to monitor her off of Keppra.     -Prior to the visit she will undergo a new MRI brain at [facility].     -I spent > 40 minutes in face to face consultation to review and discuss the above; 50% of which or more was dedicated to counseling.     Electronic Signatures:  John Musa)  (Signed 13-May-2024 0855 am)        Authored: Patient Visit Information, Cancer History,  History of Present Illness, Review of Systems, Allergies and Outpatient Medication Profile, Problem List, Social History, Physical Exam, Results, Assessment and Plan, To Send Document via Auto Fax, Attestation

## 2024-06-03 ENCOUNTER — OFFICE VISIT (OUTPATIENT)
Dept: PRIMARY CARE | Facility: CLINIC | Age: 44
End: 2024-06-03
Payer: COMMERCIAL

## 2024-06-03 VITALS
WEIGHT: 204 LBS | TEMPERATURE: 97.5 F | SYSTOLIC BLOOD PRESSURE: 105 MMHG | HEIGHT: 68 IN | BODY MASS INDEX: 30.92 KG/M2 | DIASTOLIC BLOOD PRESSURE: 69 MMHG | HEART RATE: 66 BPM | OXYGEN SATURATION: 97 %

## 2024-06-03 DIAGNOSIS — Z12.11 COLON CANCER SCREENING: ICD-10-CM

## 2024-06-03 DIAGNOSIS — J45.20 MILD INTERMITTENT ASTHMA, UNSPECIFIED WHETHER COMPLICATED (HHS-HCC): ICD-10-CM

## 2024-06-03 DIAGNOSIS — E27.9 DISORDER OF ADRENAL GLAND, UNSPECIFIED (MULTI): ICD-10-CM

## 2024-06-03 DIAGNOSIS — E55.9 VITAMIN D DEFICIENCY: ICD-10-CM

## 2024-06-03 DIAGNOSIS — C79.31 MALIGNANT MELANOMA METASTATIC TO BRAIN (MULTI): ICD-10-CM

## 2024-06-03 DIAGNOSIS — Z85.820 HISTORY OF MELANOMA: ICD-10-CM

## 2024-06-03 DIAGNOSIS — E66.9 CLASS 1 OBESITY WITH BODY MASS INDEX (BMI) OF 31.0 TO 31.9 IN ADULT, UNSPECIFIED OBESITY TYPE, UNSPECIFIED WHETHER SERIOUS COMORBIDITY PRESENT: Primary | ICD-10-CM

## 2024-06-03 PROBLEM — N95.1 MENOPAUSAL SYNDROME: Status: ACTIVE | Noted: 2024-06-03

## 2024-06-03 PROBLEM — N93.0 BLEEDING AFTER INTERCOURSE: Status: RESOLVED | Noted: 2023-05-18 | Resolved: 2024-06-03

## 2024-06-03 PROCEDURE — 99214 OFFICE O/P EST MOD 30 MIN: CPT | Performed by: INTERNAL MEDICINE

## 2024-06-03 PROCEDURE — 3008F BODY MASS INDEX DOCD: CPT | Performed by: INTERNAL MEDICINE

## 2024-06-03 RX ORDER — LIRAGLUTIDE 6 MG/ML
0.6 INJECTION, SOLUTION SUBCUTANEOUS
Qty: 12 ML | Refills: 1 | Status: SHIPPED | OUTPATIENT
Start: 2024-06-09

## 2024-06-03 NOTE — PROGRESS NOTES
"Subjective   Patient ID: Tori Taylor is a 43 y.o. female who presents for Obesity (Struggling trying to loose weight/).  HPI  Craves sugar  Exercising  Can't lose wt  Would like med  No cp   No sob  Dtr leaving for college soon  Ins will do saxenda w prior auth  If not adipex     Review of Systems  Gen:  no fever  HEENT:  no trouble swallowing  CV:  no dyspnea, cyanosis  Lungs:  no shortness of breath  GI:  no constipation, no blood in stool  Vascular:  no edema  Neuro:   no weakness  Skin:  no rash  MS:no joint swelling  Gu:  no urinary complaints  All other systems have been reviewed and are negative for complaint    /69   Pulse 66   Temp 36.4 °C (97.5 °F) (Temporal)   Ht 1.727 m (5' 8\")   Wt 92.5 kg (204 lb)   SpO2 97%   BMI 31.02 kg/m²   Objective   Physical Exam  Lab Results   Component Value Date    WBC 8.0 02/05/2024    HGB 14.8 02/05/2024    HCT 43.9 02/05/2024    MCV 98 02/05/2024     02/05/2024     Lab Results   Component Value Date    GLUCOSE 77 02/05/2024    CALCIUM 9.7 02/05/2024     02/05/2024    K 4.1 02/05/2024    CO2 29 02/05/2024     02/05/2024    BUN 17 02/05/2024    CREATININE 0.73 02/05/2024     Social History     Socioeconomic History    Marital status:      Spouse name: None    Number of children: None    Years of education: None    Highest education level: None   Occupational History    None   Tobacco Use    Smoking status: Former     Types: Cigarettes    Smokeless tobacco: Never   Substance and Sexual Activity    Alcohol use: Yes     Comment: social    Drug use: Never    Sexual activity: Yes     Partners: Male     Birth control/protection: I.U.D.   Other Topics Concern    None   Social History Narrative    None     Social Determinants of Health     Financial Resource Strain: Not on file   Food Insecurity: Not on file   Transportation Needs: Not on file   Physical Activity: Not on file   Stress: Not on file   Social Connections: Not on file "   Intimate Partner Violence: Not on file   Housing Stability: Not on file     Family History   Problem Relation Name Age of Onset    Deep vein thrombosis Mother  40    Diabetes Father         General:  Alert and in  NAD  Lungs, CTAB  Skin:  no suspicious lesions,  warm and dry  Head :  Normocephalic  Neck/thyroid:  neck supple, full rom, no cervical lymphadenopathy  no thyromegaly  Heart:  RRR  no murmurs  Abdomen:  Normal , bs present, soft, nontender, not distended, no masses palpated  Extremities:  No clubbing, cyanosis, or edema  Neurologic:  Nonfocal  Psych: alert, normal mood      Tori was seen today for obesity.  Diagnoses and all orders for this visit:  Class 1 obesity with body mass index (BMI) of 31.0 to 31.9 in adult, unspecified obesity type, unspecified whether serious comorbidity present (Primary)  -     liraglutide, weight loss, (Saxenda) 3 mg/0.5 mL (18 mg/3 mL) pen injector injection; Inject 0.1 mL (0.6 mg) under the skin 1 (one) time per week.  Colon cancer screening  -     Cologuard® colon cancer screening; Future  -     Cologuard® colon cancer screening  Disorder of adrenal gland, unspecified (Multi)  Mild intermittent asthma, unspecified whether complicated (HHS-HCC)  History of melanoma  Malignant melanoma metastatic to brain (Multi)  Vitamin D deficiency  If saxenda not approved will do adipex  Consent/contract obtained    Chronic conditions reviewed in the assessment and plan.    Continue medications unless specified otherwise.  Previous labs reviewed.   Other specialty provider notes reviewed.   Follow up in 2 months.   The risks and benefits and side effects of medication were reviewed with the patient.

## 2024-06-04 PROBLEM — I95.9 HYPOTENSION: Status: RESOLVED | Noted: 2023-05-18 | Resolved: 2024-06-04

## 2024-06-04 PROBLEM — R06.02 SHORTNESS OF BREATH: Status: RESOLVED | Noted: 2019-03-04 | Resolved: 2024-06-04

## 2024-06-04 PROBLEM — R05.9 COUGH: Status: RESOLVED | Noted: 2023-05-18 | Resolved: 2024-06-04

## 2024-06-04 PROBLEM — O03.9: Status: RESOLVED | Noted: 2018-11-07 | Resolved: 2024-06-04

## 2024-06-04 PROBLEM — R41.82 CHANGE IN MENTAL STATUS: Status: RESOLVED | Noted: 2023-11-06 | Resolved: 2024-06-04

## 2024-06-04 PROBLEM — B96.89 BACTERIAL VAGINOSIS: Status: RESOLVED | Noted: 2023-05-18 | Resolved: 2024-06-04

## 2024-06-04 PROBLEM — O20.9 HEMORRHAGE IN EARLY PREGNANCY (HHS-HCC): Status: RESOLVED | Noted: 2018-10-15 | Resolved: 2024-06-04

## 2024-06-04 PROBLEM — N76.0 BACTERIAL VAGINOSIS: Status: RESOLVED | Noted: 2023-05-18 | Resolved: 2024-06-04

## 2024-06-04 PROBLEM — R30.0 BURNING WITH URINATION: Status: RESOLVED | Noted: 2023-05-18 | Resolved: 2024-06-04

## 2024-06-04 PROBLEM — R51.9 NEW ONSET HEADACHE: Status: RESOLVED | Noted: 2023-05-18 | Resolved: 2024-06-04

## 2024-06-04 PROBLEM — Z97.5 USES INTRAUTERINE DEVICE FOR BIRTH CONTROL: Status: RESOLVED | Noted: 2023-08-01 | Resolved: 2024-06-04

## 2024-06-04 PROBLEM — R00.2 PALPITATIONS: Status: RESOLVED | Noted: 2021-03-23 | Resolved: 2024-06-04

## 2024-06-04 PROBLEM — R42 DIZZINESS: Status: RESOLVED | Noted: 2023-05-18 | Resolved: 2024-06-04

## 2024-06-04 PROBLEM — I61.9 INTRACEREBRAL HEMORRHAGE (MULTI): Status: RESOLVED | Noted: 2023-11-06 | Resolved: 2024-06-04

## 2024-06-04 PROBLEM — R63.5 WEIGHT GAIN: Status: RESOLVED | Noted: 2023-05-18 | Resolved: 2024-06-04

## 2024-06-04 PROBLEM — Z86.39 HISTORY OF HYPOTHYROIDISM: Status: RESOLVED | Noted: 2023-12-15 | Resolved: 2024-06-04

## 2024-06-04 PROBLEM — O26.841: Status: RESOLVED | Noted: 2018-10-15 | Resolved: 2024-06-04

## 2024-06-04 PROBLEM — R56.9 UNSPECIFIED CONVULSIONS (MULTI): Status: RESOLVED | Noted: 2023-04-04 | Resolved: 2024-06-04

## 2024-06-04 PROBLEM — E66.3 OVERWEIGHT WITH BODY MASS INDEX (BMI) OF 25 TO 25.9 IN ADULT: Status: RESOLVED | Noted: 2023-11-06 | Resolved: 2024-06-04

## 2024-06-04 PROBLEM — N39.0 URINARY TRACT INFECTION: Status: RESOLVED | Noted: 2018-06-30 | Resolved: 2024-06-04

## 2024-06-04 PROBLEM — R63.4 WEIGHT LOSS: Status: RESOLVED | Noted: 2023-05-18 | Resolved: 2024-06-04

## 2024-06-04 PROBLEM — L03.90 CELLULITIS: Status: RESOLVED | Noted: 2023-05-18 | Resolved: 2024-06-04

## 2024-07-10 ENCOUNTER — TELEPHONE (OUTPATIENT)
Dept: PRIMARY CARE | Facility: CLINIC | Age: 44
End: 2024-07-10

## 2024-07-10 ENCOUNTER — OFFICE VISIT (OUTPATIENT)
Dept: PRIMARY CARE | Facility: CLINIC | Age: 44
End: 2024-07-10
Payer: COMMERCIAL

## 2024-07-10 VITALS
BODY MASS INDEX: 30.01 KG/M2 | HEIGHT: 68 IN | HEART RATE: 60 BPM | SYSTOLIC BLOOD PRESSURE: 113 MMHG | DIASTOLIC BLOOD PRESSURE: 75 MMHG | WEIGHT: 198 LBS | TEMPERATURE: 96.8 F | OXYGEN SATURATION: 98 %

## 2024-07-10 DIAGNOSIS — E55.9 VITAMIN D DEFICIENCY: ICD-10-CM

## 2024-07-10 DIAGNOSIS — E66.09 CLASS 1 OBESITY DUE TO EXCESS CALORIES WITH BODY MASS INDEX (BMI) OF 30.0 TO 30.9 IN ADULT, UNSPECIFIED WHETHER SERIOUS COMORBIDITY PRESENT: Primary | ICD-10-CM

## 2024-07-10 DIAGNOSIS — R53.83 OTHER FATIGUE: ICD-10-CM

## 2024-07-10 DIAGNOSIS — E66.9 CLASS 1 OBESITY WITH BODY MASS INDEX (BMI) OF 31.0 TO 31.9 IN ADULT, UNSPECIFIED OBESITY TYPE, UNSPECIFIED WHETHER SERIOUS COMORBIDITY PRESENT: ICD-10-CM

## 2024-07-10 PROCEDURE — 99214 OFFICE O/P EST MOD 30 MIN: CPT | Performed by: INTERNAL MEDICINE

## 2024-07-10 PROCEDURE — 1036F TOBACCO NON-USER: CPT | Performed by: INTERNAL MEDICINE

## 2024-07-10 PROCEDURE — 3008F BODY MASS INDEX DOCD: CPT | Performed by: INTERNAL MEDICINE

## 2024-07-10 RX ORDER — LIRAGLUTIDE 6 MG/ML
3 INJECTION, SOLUTION SUBCUTANEOUS
Qty: 12 ML | Refills: 3 | Status: SHIPPED | OUTPATIENT
Start: 2024-07-14 | End: 2024-07-11 | Stop reason: SDUPTHER

## 2024-07-10 ASSESSMENT — PATIENT HEALTH QUESTIONNAIRE - PHQ9
1. LITTLE INTEREST OR PLEASURE IN DOING THINGS: NOT AT ALL
2. FEELING DOWN, DEPRESSED OR HOPELESS: NOT AT ALL
SUM OF ALL RESPONSES TO PHQ9 QUESTIONS 1 AND 2: 0

## 2024-07-10 NOTE — PROGRESS NOTES
"Subjective   Patient ID: Tori Taylor is a 43 y.o. female who presents for Follow-up (Med follow up).  HPI  Doing well  Some fatigue  One hour after feels exhausted  Cannot eat spicy foods  No cp no sob  Dtr going to college  No abd pain      Review of Systems  Gen:  no fever  HEENT:  no trouble swallowing  CV:  no dyspnea, cyanosis  Lungs:  no shortness of breath  GI:  no constipation, no blood in stool  Vascular:  no edema  Neuro:   no weakness  Skin:  no rash  MS:no joint swelling  Gu:  no urinary complaints  All other systems have been reviewed and are negative for complaint    /75   Pulse 60   Temp 36 °C (96.8 °F) (Temporal)   Ht 1.727 m (5' 8\")   Wt 89.8 kg (198 lb)   SpO2 98%   BMI 30.11 kg/m²   Objective   Physical Exam  Lab Results   Component Value Date    WBC 8.0 02/05/2024    HGB 14.8 02/05/2024    HCT 43.9 02/05/2024    MCV 98 02/05/2024     02/05/2024     Lab Results   Component Value Date    GLUCOSE 77 02/05/2024    CALCIUM 9.7 02/05/2024     02/05/2024    K 4.1 02/05/2024    CO2 29 02/05/2024     02/05/2024    BUN 17 02/05/2024    CREATININE 0.73 02/05/2024     Social History     Socioeconomic History    Marital status:    Tobacco Use    Smoking status: Former     Types: Cigarettes    Smokeless tobacco: Never   Substance and Sexual Activity    Alcohol use: Yes     Comment: social    Drug use: Never    Sexual activity: Yes     Partners: Male     Birth control/protection: I.U.D.     Family History   Problem Relation Name Age of Onset    Deep vein thrombosis Mother  40    Diabetes Father         General:  Alert and in  NAD  Lungs, CTAB  Skin:  no suspicious lesions,  warm and dry  Head :  Normocephalic  Neck/thyroid:  neck supple, full rom, no cervical lymphadenopathy  no thyromegaly  Heart:  RRR  no murmurs  Abdomen:  Normal , bs present, soft, nontender, not distended, no masses palpated  Extremities:  No clubbing, cyanosis, or edema  Neurologic:  " Nonfocal  Psych: alert, normal mood      Tori was seen today for follow-up.  Diagnoses and all orders for this visit:  Class 1 obesity due to excess calories with body mass index (BMI) of 30.0 to 30.9 in adult, unspecified whether serious comorbidity present (Primary)  -     TSH with reflex to Free T4 if abnormal; Future  -     Comprehensive Metabolic Panel; Future  -     Vitamin B12; Future  -     CBC and Auto Differential; Future  -     Lipid Panel; Future  Vitamin D deficiency  -     TSH with reflex to Free T4 if abnormal; Future  -     Comprehensive Metabolic Panel; Future  -     Vitamin B12; Future  -     Vitamin D 25-Hydroxy,Total (for eval of Vitamin D levels); Future  -     CBC and Auto Differential; Future  -     Lipid Panel; Future  Other fatigue  -     TSH with reflex to Free T4 if abnormal; Future  -     Comprehensive Metabolic Panel; Future  -     Vitamin B12; Future  -     CBC and Auto Differential; Future  -     Lipid Panel; Future  Class 1 obesity with body mass index (BMI) of 31.0 to 31.9 in adult, unspecified obesity type, unspecified whether serious comorbidity present  -     liraglutide, weight loss, (Saxenda) 3 mg/0.5 mL (18 mg/3 mL) pen injector injection; Inject 0.5 mL (3 mg) under the skin 1 (one) time per week.    Fatigue monitor , fu sooner if worse  Chronic conditions reviewed in the assessment and plan.    Continue medications unless specified otherwise.  Previous labs reviewed.    Follow up in 3 months or prn.

## 2024-07-11 ENCOUNTER — TELEPHONE (OUTPATIENT)
Dept: PRIMARY CARE | Facility: CLINIC | Age: 44
End: 2024-07-11
Payer: COMMERCIAL

## 2024-07-11 DIAGNOSIS — E66.9 CLASS 1 OBESITY WITH BODY MASS INDEX (BMI) OF 31.0 TO 31.9 IN ADULT, UNSPECIFIED OBESITY TYPE, UNSPECIFIED WHETHER SERIOUS COMORBIDITY PRESENT: ICD-10-CM

## 2024-07-11 RX ORDER — LIRAGLUTIDE 6 MG/ML
3 INJECTION, SOLUTION SUBCUTANEOUS DAILY
Qty: 12 ML | Refills: 0 | Status: SHIPPED | OUTPATIENT
Start: 2024-07-11

## 2024-07-11 NOTE — TELEPHONE ENCOUNTER
Martha pharmacist called in regarding Rx for Saxenda that was sent in for Pt today . She stated that Rx was for 4 pens. She is inquiring if Rx can be changed to 5 pens so that Pt will have a 30 day supply.     Please advise

## 2024-08-08 ENCOUNTER — APPOINTMENT (OUTPATIENT)
Dept: RADIOLOGY | Facility: CLINIC | Age: 44
End: 2024-08-08
Payer: COMMERCIAL

## 2024-08-08 ENCOUNTER — HOSPITAL ENCOUNTER (OUTPATIENT)
Dept: RADIOLOGY | Facility: CLINIC | Age: 44
End: 2024-08-08
Payer: COMMERCIAL

## 2024-08-09 ENCOUNTER — HOSPITAL ENCOUNTER (OUTPATIENT)
Dept: RADIOLOGY | Facility: HOSPITAL | Age: 44
Discharge: HOME | End: 2024-08-09
Payer: COMMERCIAL

## 2024-08-09 DIAGNOSIS — C79.31 MALIGNANT MELANOMA METASTATIC TO BRAIN (MULTI): ICD-10-CM

## 2024-08-09 DIAGNOSIS — C77.9 MALIGNANT MELANOMA METASTATIC TO LYMPH NODE (MULTI): ICD-10-CM

## 2024-08-09 DIAGNOSIS — C79.31 MALIGNANT NEOPLASM METASTATIC TO BRAIN (MULTI): ICD-10-CM

## 2024-08-09 DIAGNOSIS — C43.9 METASTATIC MELANOMA (MULTI): ICD-10-CM

## 2024-08-09 PROCEDURE — A9575 INJ GADOTERATE MEGLUMI 0.1ML: HCPCS | Performed by: PSYCHIATRY & NEUROLOGY

## 2024-08-09 PROCEDURE — 74177 CT ABD & PELVIS W/CONTRAST: CPT

## 2024-08-09 PROCEDURE — 2550000001 HC RX 255 CONTRASTS: Performed by: NURSE PRACTITIONER

## 2024-08-09 PROCEDURE — 2550000001 HC RX 255 CONTRASTS: Performed by: PSYCHIATRY & NEUROLOGY

## 2024-08-09 PROCEDURE — 70553 MRI BRAIN STEM W/O & W/DYE: CPT

## 2024-08-09 RX ORDER — GADOTERATE MEGLUMINE 376.9 MG/ML
0.2 INJECTION INTRAVENOUS
Status: COMPLETED | OUTPATIENT
Start: 2024-08-09 | End: 2024-08-09

## 2024-08-12 ENCOUNTER — APPOINTMENT (OUTPATIENT)
Dept: RADIOLOGY | Facility: CLINIC | Age: 44
End: 2024-08-12
Payer: COMMERCIAL

## 2024-08-15 ENCOUNTER — LAB (OUTPATIENT)
Dept: LAB | Facility: LAB | Age: 44
End: 2024-08-15
Payer: COMMERCIAL

## 2024-08-15 DIAGNOSIS — R73.09 ELEVATED GLUCOSE: ICD-10-CM

## 2024-08-15 DIAGNOSIS — C79.31 MALIGNANT NEOPLASM METASTATIC TO BRAIN (MULTI): ICD-10-CM

## 2024-08-15 DIAGNOSIS — C77.9 MALIGNANT MELANOMA METASTATIC TO LYMPH NODE (MULTI): ICD-10-CM

## 2024-08-15 DIAGNOSIS — E66.09 CLASS 1 OBESITY DUE TO EXCESS CALORIES WITH BODY MASS INDEX (BMI) OF 30.0 TO 30.9 IN ADULT, UNSPECIFIED WHETHER SERIOUS COMORBIDITY PRESENT: ICD-10-CM

## 2024-08-15 DIAGNOSIS — C43.9 METASTATIC MELANOMA (MULTI): ICD-10-CM

## 2024-08-15 DIAGNOSIS — E55.9 VITAMIN D DEFICIENCY: ICD-10-CM

## 2024-08-15 DIAGNOSIS — R53.83 OTHER FATIGUE: ICD-10-CM

## 2024-08-15 LAB
25(OH)D3 SERPL-MCNC: 35 NG/ML (ref 30–100)
ALBUMIN SERPL BCP-MCNC: 4.3 G/DL (ref 3.4–5)
ALP SERPL-CCNC: 33 U/L (ref 33–110)
ALT SERPL W P-5'-P-CCNC: 8 U/L (ref 7–45)
ANION GAP SERPL CALC-SCNC: 10 MMOL/L (ref 10–20)
AST SERPL W P-5'-P-CCNC: 12 U/L (ref 9–39)
BASOPHILS # BLD AUTO: 0.05 X10*3/UL (ref 0–0.1)
BASOPHILS NFR BLD AUTO: 1 %
BILIRUB SERPL-MCNC: 0.7 MG/DL (ref 0–1.2)
BUN SERPL-MCNC: 10 MG/DL (ref 6–23)
CALCIUM SERPL-MCNC: 9.1 MG/DL (ref 8.6–10.3)
CHLORIDE SERPL-SCNC: 108 MMOL/L (ref 98–107)
CHOLEST SERPL-MCNC: 158 MG/DL (ref 0–199)
CHOLESTEROL/HDL RATIO: 3.6
CO2 SERPL-SCNC: 30 MMOL/L (ref 21–32)
CREAT SERPL-MCNC: 0.68 MG/DL (ref 0.5–1.05)
EGFRCR SERPLBLD CKD-EPI 2021: >90 ML/MIN/1.73M*2
EOSINOPHIL # BLD AUTO: 0.18 X10*3/UL (ref 0–0.7)
EOSINOPHIL NFR BLD AUTO: 3.5 %
ERYTHROCYTE [DISTWIDTH] IN BLOOD BY AUTOMATED COUNT: 12.5 % (ref 11.5–14.5)
GLUCOSE SERPL-MCNC: 72 MG/DL (ref 74–99)
HCT VFR BLD AUTO: 41.3 % (ref 36–46)
HDLC SERPL-MCNC: 43.4 MG/DL
HGB BLD-MCNC: 13.5 G/DL (ref 12–16)
IMM GRANULOCYTES # BLD AUTO: 0.01 X10*3/UL (ref 0–0.7)
IMM GRANULOCYTES NFR BLD AUTO: 0.2 % (ref 0–0.9)
LDH SERPL L TO P-CCNC: 123 U/L (ref 84–246)
LDLC SERPL CALC-MCNC: 94 MG/DL
LYMPHOCYTES # BLD AUTO: 2.43 X10*3/UL (ref 1.2–4.8)
LYMPHOCYTES NFR BLD AUTO: 47.8 %
MCH RBC QN AUTO: 32 PG (ref 26–34)
MCHC RBC AUTO-ENTMCNC: 32.7 G/DL (ref 32–36)
MCV RBC AUTO: 98 FL (ref 80–100)
MONOCYTES # BLD AUTO: 0.47 X10*3/UL (ref 0.1–1)
MONOCYTES NFR BLD AUTO: 9.3 %
NEUTROPHILS # BLD AUTO: 1.94 X10*3/UL (ref 1.2–7.7)
NEUTROPHILS NFR BLD AUTO: 38.2 %
NON HDL CHOLESTEROL: 115 MG/DL (ref 0–149)
NRBC BLD-RTO: 0 /100 WBCS (ref 0–0)
PLATELET # BLD AUTO: 202 X10*3/UL (ref 150–450)
POTASSIUM SERPL-SCNC: 3.9 MMOL/L (ref 3.5–5.3)
PROT SERPL-MCNC: 6.7 G/DL (ref 6.4–8.2)
RBC # BLD AUTO: 4.22 X10*6/UL (ref 4–5.2)
SODIUM SERPL-SCNC: 144 MMOL/L (ref 136–145)
TRIGL SERPL-MCNC: 105 MG/DL (ref 0–149)
TSH SERPL-ACNC: 2.31 MIU/L (ref 0.44–3.98)
VIT B12 SERPL-MCNC: 698 PG/ML (ref 211–911)
VLDL: 21 MG/DL (ref 0–40)
WBC # BLD AUTO: 5.1 X10*3/UL (ref 4.4–11.3)

## 2024-08-15 PROCEDURE — 83036 HEMOGLOBIN GLYCOSYLATED A1C: CPT

## 2024-08-15 PROCEDURE — 83615 LACTATE (LD) (LDH) ENZYME: CPT

## 2024-08-15 PROCEDURE — 85025 COMPLETE CBC W/AUTO DIFF WBC: CPT

## 2024-08-15 PROCEDURE — 80053 COMPREHEN METABOLIC PANEL: CPT

## 2024-08-15 PROCEDURE — 82607 VITAMIN B-12: CPT

## 2024-08-15 PROCEDURE — 84443 ASSAY THYROID STIM HORMONE: CPT

## 2024-08-15 PROCEDURE — 80061 LIPID PANEL: CPT

## 2024-08-15 PROCEDURE — 82306 VITAMIN D 25 HYDROXY: CPT

## 2024-08-15 PROCEDURE — 36415 COLL VENOUS BLD VENIPUNCTURE: CPT

## 2024-08-16 LAB
EST. AVERAGE GLUCOSE BLD GHB EST-MCNC: 103 MG/DL
HBA1C MFR BLD: 5.2 %

## 2024-08-19 ENCOUNTER — APPOINTMENT (OUTPATIENT)
Dept: RADIOLOGY | Facility: CLINIC | Age: 44
End: 2024-08-19
Payer: COMMERCIAL

## 2024-08-19 ENCOUNTER — OFFICE VISIT (OUTPATIENT)
Dept: HEMATOLOGY/ONCOLOGY | Facility: HOSPITAL | Age: 44
End: 2024-08-19
Payer: COMMERCIAL

## 2024-08-19 ENCOUNTER — TELEPHONE (OUTPATIENT)
Dept: PRIMARY CARE | Facility: CLINIC | Age: 44
End: 2024-08-19

## 2024-08-19 VITALS
SYSTOLIC BLOOD PRESSURE: 102 MMHG | OXYGEN SATURATION: 100 % | WEIGHT: 191.58 LBS | HEART RATE: 103 BPM | BODY MASS INDEX: 29.13 KG/M2 | RESPIRATION RATE: 18 BRPM | TEMPERATURE: 97 F | DIASTOLIC BLOOD PRESSURE: 63 MMHG

## 2024-08-19 DIAGNOSIS — E66.9 CLASS 1 OBESITY WITH BODY MASS INDEX (BMI) OF 31.0 TO 31.9 IN ADULT, UNSPECIFIED OBESITY TYPE, UNSPECIFIED WHETHER SERIOUS COMORBIDITY PRESENT: ICD-10-CM

## 2024-08-19 DIAGNOSIS — C79.31 MALIGNANT MELANOMA METASTATIC TO BRAIN (MULTI): Primary | ICD-10-CM

## 2024-08-19 DIAGNOSIS — C79.31 MALIGNANT MELANOMA METASTATIC TO BRAIN (MULTI): ICD-10-CM

## 2024-08-19 PROCEDURE — 99215 OFFICE O/P EST HI 40 MIN: CPT | Performed by: PSYCHIATRY & NEUROLOGY

## 2024-08-19 PROCEDURE — 1036F TOBACCO NON-USER: CPT | Performed by: PSYCHIATRY & NEUROLOGY

## 2024-08-19 RX ORDER — LIRAGLUTIDE 6 MG/ML
3 INJECTION, SOLUTION SUBCUTANEOUS DAILY
Qty: 12 ML | Refills: 0 | Status: SHIPPED | OUTPATIENT
Start: 2024-08-19 | End: 2024-08-22 | Stop reason: SDUPTHER

## 2024-08-19 ASSESSMENT — PAIN SCALES - GENERAL: PAINLEVEL: 0-NO PAIN

## 2024-08-19 NOTE — PROGRESS NOTES
Patient Visit Information:   Visit Type: Virtual Visit:  An interactive audio  and video telecommunication system which permits real time communications between the patient (at the originating site) and provider (at the distant site) was utilized to provide this telehealth service.   Verbal Consent for Encounter: Verbal consent was  requested and obtained from patient, or from parent/guardian if minor, on this date for a telehealth visit.      Cancer History:          Skin - Melanoma         AJCC Edition: 7th, Diagnosis Date: 16-Jun-2014, Stage III, T1a N1a M0          Melanoma of the Skin         AJCC Edition: 8th (AJCC), Diagnosis Date: 28-Jun-2020, Stage IV, pTX cN1 cM1d(1)      Treatment Synopsis:    --History of metastatic melanoma diagnosed in 2014 s/p local excision of right forearm with partial thickness flap and axillary LND s/p 10 months  of adjuvant interferon alpha therapy, stopped due to thyroiditis.  --Solitary brain metastasis left parietal lobe, status post left-sided craniotomy for hemorrhagic tumor resection and duraplasty with pericranial graft by Dr. Ruth Veliz  on 6/26/2020.  --Completed adjuvant SBRT therapy post-resection site by Dr. Leticia Mendoza--dates 7/27-31/2020  --Pending PET/CT for 4 mm solitary RUL pulmonary nodule, sub cm liver lesion, and enlarged left adrenal gland.  --8/7/2020:  Discussion for immunotherapy vs. 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  --8/21/2020: Discussed results of 8/13/2020 PET/CT, discuss immunotherapy options after tumor board meeting 8/17/2020.  Consent for Pembrolizumab.  --Patient received 5 Pembrolizumab. infusions since September 2020.  Hospitalized for confusion episode concerning for focal seizure.  MRI more  suggesting of immunotherapy flare rather than progression of disease (missed last 2 doses due to symptoms).  --Plan to continue Keppra and resumed Pembrolizumab 2/1/2021  --Restaging scans 5/2022 with ALMA in brain or PET CT  "C/A/P  --Completed 2 years of Pembrolizumab, restaging  --Followed with serial imaging.  -- 7/2023: CT C/A/P with an enlarging 1 cm liver lesion, uncertain etiology.  Stable pulmonary nodule.  Cervical cyst for which she will see gynecology.  No changes in MRI brain.     Treatment History:    2014-7-3: Cancer Related Surgery: OPERATION/PROCEDURE:    Wide excision malignant melanoma of right arm, 10 x 5 cm skin      excision.  Full-thickness skin graft to right arm skin and soft tissue      defect, 6 x 3 cm.  Right axillary sentinel lymph node biopsy.  2014-7-3: Cancer Related Surgery: Wide local excision and sentinel lymph node biopsy with 1 of 2 nodes positive for metastatic disease  2014-7-3: Cancer Related Surgery: Right axillary raji dissection with 0/42 nodes with evidence of disease  2014-9-2: Chemotherapy: commenced adjuvant high dose IFN alpha 2B     2020-9-14: New Treatment Plan: First dose Pembrolizumab 200mg IV every 3 weeks (5 doses, then missed 2 doses for concern of new findings on MRI)--Resumed #6 2/1/2021     History of Present Illness:      ID Statement:    KADE TEMPLETON is a 42 year old Female        Chief Complaint: Follow up of cutaneous melanoma   Interval History:    Neuro-Oncology History and Physical  Diagnosis: Cutaneous melanoma  Staging:  IV  KPS:  90  Molecular: BRAF V 600E +     1/5/21: This is a 41-year-old woman who presents for evaluation after an episode of altered awareness occurring on 12/6/2020.  She states that the episode initially started with flashing lights out  of her right eye, did not notice any out of her left eye, which was followed by an extended period of approximately 45 minutes of confusion. She also experienced confusion which she described as brain \"fogginess\". She slowly returned to her baseline.  She was seen in the emergency department and CT head done at that time did not demonstrate any acute changes. A follow-up MRI occurring on 12/16/2020 " demonstrated 3 new ovoid enhancing foci with flair hyperintensity within the brain parenchyma just deep  to the resection cavity. These were felt to potentially represent therapy effect however metastases were not excluded. She was discussed at tumor board and recommended for advanced imaging and EEG. A spot EEG was done and did not demonstrate any epileptiform  discharges. Advanced imaging is still pending. She presents here for further evaluation.     Since her episode the patient has not noticed any new neurological symptoms. She has not had any recurrence of her visual changes. 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 No weakness numbness headaches or speech difficulties. Her pembrolizumab is currently on hold per Dr. Caceres.     4/4/23: The patient is present virtually for follow up. She is feeling well.  Lost weight intentionally.  No significant complaints.  Asking about  tapering off of Keppra.     INTERVAL HISTORY (8/19/2024): Since the last visit, she continues to do very well neurologically. She denies any headaches, seizures, speech issues, visual loss, new focal weakness, gait difficulty, or sensory loss. She is getting over a viral URI, and still has some sinus congestion. She remains off of Pembrolizumab and is followed by Dr. Welch for the melanoma. Since she never had a verified seizure in the past, and the recent seizure work-up was negative, she has been tapered off of Keppra. She continues to work full time from home as an  for the [employer] in [employer].      Review of Systems:   Review of Systems:    12 systems were reviewed and are negative except for as described above.        Allergies and Intolerances:       Allergies:         penicillins: Drug Category, Anaphylaxis, Active         Tape - Adhesive, Bandaids, Paper: Environment, Rash, Active          "penicillin: Drug, Unknown, Active         Xopenex: Drug, Unknown, Active         codeine: Drug, Unknown, Active         sulfa drugs: Drug Category, Unknown, Active     Outpatient Medication Profile:  * Patient Currently Takes Medications as of 20-Dec-2022 13:38 documented in Structured Notes         Keppra 500 mg oral tablet : 1 tab(s) orally 2 times a day , Start Date: 29-Aug-2022         prochlorperazine 10 mg oral tablet: 1 tab(s) orally every 8 hours, As  Needed -for nausea and vomiting , Start Date: 15-Feb-2022         ferrous sulfate 325 mg (65 mg elemental iron) oral delayed release tablet : 1 tab(s) orally every other day , Start Date: 28-Apr-2021         acetaminophen 325 mg oral tablet: 2 tab(s) orally every 6 hours, As needed,  Pain - Mild (1-3), Start Date: 28-Jun-2020         Lymphedema sleeve: Right Arm          Fit and Dispense         20- 30 mm Hg         I89.0         , Start Date: 05-Aug-2019         Vitamin D3 5000 intl units oral capsule: 1 cap(s) orally once a day         Allegra 24 Hour Allergy oral tablet: 1 tab(s) orally once a day         Vitamin B12 1000 mcg/mL injectable solution: injectable twice a week          Medical History:         Bilateral leg paresthesia: ICD-10: R20.2, Status: Active         Melanoma metastatic to brain: ICD-10: C79.31, Status: Active         History of thyroiditis: ICD-10: Z86.39, Status: Active         Malignant melanoma: ICD-10: C43.9, Status: Active         MALIG MELANOMA ARM (172.6): Status: Active, Description: Selected  in Cancer Staging Date of Diagnosis: 16-Jun-2014                  Copy From Visit ID: [id number] Problem List Type: Additional Dx         Abdominal pain: ICD-10: R10.9, Status: Active        Social History:   Social Substance History:  ·  Smoking Status former smoker (1)   ·  Additional History     quit tobacco 2013, prior light \"social\" smoker. Currently working from home. Single parent to two children.   (1)     Lab Results:     ·  Results  "         Lab Results   Component Value Date    WBC 5.1 08/15/2024    HGB 13.5 08/15/2024    HCT 41.3 08/15/2024    MCV 98 08/15/2024     08/15/2024        Radiology Result:     ·  Results         The new and recent brain MRI scans were reviewed with the patient and family:       === 08/09/24 ===    MR BRAIN W AND WO CONTRAST    - Impression -  1. Postoperative changes from resection of a mass located within the  left parieto-occipital lobes.    2. Circumferential enhancement surrounding the resection cavity is  unchanged, however there is increase in 7 mm focus of linear  enhancement located within the left parietal lobe located medial to  the surgical cavity. Appearance of this lesion is not typical for  metastasis and could reflect evolving posttreatment changes,  attention on follow-up imaging.    3. Otherwise, no intracranial masses or lesions.    This study was interpreted at .    MACRO:  None    Signed by: Logan Sanchez 8/9/2024 3:06 PM  Dictation workstation:   [id number]      === 04/29/24 ===    MR BRAIN W AND WO CONTRAST    - Impression -  *Postoperative changes as described  *there is no evidence of mass, infarction or hemorrhage.    MACRO:  none    Signed by: Ray Asencio 4/29/2024 11:32 AM  Dictation workstation:   [id number]     Assessment and Plan:   Assessment:    43 y/o woman with metastatic melanoma on pembrolizumab who presents for follow up. Course complicated by ? complex seizure and grade I IRAE with lesions  in the brain. Symptoms resolved with prednisone. s/p 2 years of pembrolizumab. Note she has never received BRAF directed therapy.  On surveillance.  We reviewed the patient's most recent labs and imaging showing an enlarging 1 cm lesion in the liver.  Will get MRI of the liver to further evaluate this.  She will see her gynecologist  for the fibroid, cervical/ovarian cysts (has PCOS).  We had a long discussion regarding her keppra.   She is quite adamant that she wants to come off.  She has only had 1 event that could be considered possibly a seizure event over 2 years ago.  No further  episodes.  We discussed being very cautious and tapering off of keppra.  She will notify us STAT if she notices any new symptoms.  Will recheck B12 levels as she is on twice weekly B12 (did not respond to lower doses).  Also recheck ACTH as it was a bit  lower on last follow up.  Will refer to Dr. Welch and Estephania Anton.      # Metastatic melanoma  - Completed 2 years of pembrolizumab.  Continue surveillance.      -Tori has melanoma and a history of brain metastasis; s/p surgical resection and RT.     -She is doing well systemically and is now off of active treatment with Pembro.     -The new brain MRI looks very stable except for some increased curvilinear enhancement that is most consistent with RT treatment effect; no recurrence or new tumors.     -We will continue to follow her off of any active CNS treatment.     -She is to continue close follow-up with Dr. Welch and Team.     -She has completed the taper off of Keppra; no seizures or auras.     -We will have her return to this clinic in 16 weeks for further evaluation, and to monitor her off of Keppra.     -Prior to the visit she will undergo a new MRI brain at [facility].     -I spent > 40 minutes in face to face consultation to review and discuss the above; 50% of which or more was dedicated to counseling.     Electronic Signatures:  John Musa)  (Signed 19-Aug-2024 0855 am)        Authored: Patient Visit Information, Cancer History,  History of Present Illness, Review of Systems, Allergies and Outpatient Medication Profile, Problem List, Social History, Physical Exam, Results, Assessment and Plan, To Send Document via Auto Fax, Attestation

## 2024-08-19 NOTE — TELEPHONE ENCOUNTER
I called Pt to relay result and she requested a refill  on -  liraglutide, weight loss, (Saxenda) 3 mg/0.5 mL (18 mg/3 mL) pen injector injection . She stated that her current weight is 191 lbs and she is 5'8 . No side effects and she would like it to go to [facility] in [place] .

## 2024-08-19 NOTE — PATIENT INSTRUCTIONS
Your next appointment will be in 4 months with Dia Corona PA-C.  You will have an MRI before this visit.     Please call us with any questions or concerns at [phone] opt. 5, opt. 2  For scheduling concerns please call [phone] option 1

## 2024-08-22 ENCOUNTER — OFFICE VISIT (OUTPATIENT)
Dept: HEMATOLOGY/ONCOLOGY | Facility: CLINIC | Age: 44
End: 2024-08-22
Payer: COMMERCIAL

## 2024-08-22 VITALS
HEART RATE: 64 BPM | SYSTOLIC BLOOD PRESSURE: 102 MMHG | BODY MASS INDEX: 29.25 KG/M2 | DIASTOLIC BLOOD PRESSURE: 73 MMHG | WEIGHT: 192.4 LBS | OXYGEN SATURATION: 98 % | TEMPERATURE: 96.8 F | RESPIRATION RATE: 16 BRPM

## 2024-08-22 DIAGNOSIS — C77.9 MALIGNANT MELANOMA METASTATIC TO LYMPH NODE (MULTI): ICD-10-CM

## 2024-08-22 DIAGNOSIS — C79.31 MALIGNANT NEOPLASM METASTATIC TO BRAIN (MULTI): ICD-10-CM

## 2024-08-22 DIAGNOSIS — E66.9 CLASS 1 OBESITY WITH BODY MASS INDEX (BMI) OF 31.0 TO 31.9 IN ADULT, UNSPECIFIED OBESITY TYPE, UNSPECIFIED WHETHER SERIOUS COMORBIDITY PRESENT: ICD-10-CM

## 2024-08-22 DIAGNOSIS — C43.9 METASTATIC MELANOMA (MULTI): ICD-10-CM

## 2024-08-22 PROCEDURE — 99215 OFFICE O/P EST HI 40 MIN: CPT | Performed by: NURSE PRACTITIONER

## 2024-08-22 ASSESSMENT — ENCOUNTER SYMPTOMS
ENDOCRINE NEGATIVE: 1
RESPIRATORY NEGATIVE: 1
MUSCULOSKELETAL NEGATIVE: 1
NEUROLOGICAL NEGATIVE: 1
CONSTITUTIONAL NEGATIVE: 1
GASTROINTESTINAL NEGATIVE: 1
PSYCHIATRIC NEGATIVE: 1
HEMATOLOGIC/LYMPHATIC NEGATIVE: 1
EYES NEGATIVE: 1
CARDIOVASCULAR NEGATIVE: 1

## 2024-08-22 ASSESSMENT — PAIN SCALES - GENERAL: PAINLEVEL: 0-NO PAIN

## 2024-08-22 NOTE — PROGRESS NOTES
.Patient ID: Tori Templeton is a 43 y.o. female.  Referring Physician: Estephania Anton, APRN-CNP  [id number] [place] [address]  Primary Care Provider: Silvia Sanon MD     Oncology follow up    HPI  Cancer History:          Skin - Melanoma         AJCC Edition: 7th, Diagnosis Date: 16-Jun-2014, Stage III, T1a N1a M0          Melanoma of the Skin         AJCC Edition: 8th (AJCC), Diagnosis Date: 28-Jun-2020, Stage IV, pTX cN1 cM1d(1)      Treatment Synopsis:    Ms. TORI TEMPLETON is diagnosed with stage 3 melanoma in 2014, treated by Dr. Mariano Brown with adjuvant Interferon therapy. 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 Detailed history as below (recorded by Dr. Steve Vega).  - History of metastatic melanoma diagnosed in 2014 s/p local excision of right forearm with partial thickness flap and axillary LND s/p 10 months of adjuvant interferon alpha therapy, stopped due to thyroiditis.  - Solitary brain metastasis left parietal lobe, status post left-sided craniotomy for hemorrhagic tumor resection and duraplasty with pericranial graft by Dr. Ruth Veliz on  6/26/2020.  - Completed adjuvant SBRT therapy post-resection site by Dr. Leticia Mendoza--dates 7/27-31/2020  - Pending PET/CT for 4 mm solitary RUL pulmonary nodule, sub cm liver lesion, and enlarged left adrenal gland.  - 8/7/2020:  Discussion for immunotherapy vs. 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  - 8/21/2020: Discussed results of 8/13/2020 PET/CT, discuss immunotherapy options after tumor board meeting 8/17/2020.  Consent for Pembrolizumab.  - Patient received 5 Pembrolizumab. infusions since September 2020.  Hospitalized for confusion episode concerning for focal seizure.  MRI more suggesting of immunotherapy flare rather than progression of disease (missed last 2 doses due to symptoms).  - Plan to continue Keppra and  "resumed Pembrolizumab 2/1/2021  - Restaging scans 5/2022 with ALMA in brain or PET CT C/A/P  - Completed 2 years of Pembrolizumab, restaging  - Followed with serial imaging.  - 7/2023: CT C/A/P with an enlarging 1 cm liver lesion, uncertain etiology.  Stable pulmonary nodule.  Cervical cyst for which she will see gynecology.  No changes in MRI brain.  - 7/29/23: MRI Liver does not show any pathologic lesion.      She established care with our medical oncology team (Dr. Wilmer Welch) on 8/10/23.      Treatment History:    2014-7-3: Cancer Related Surgery: OPERATION/PROCEDURE:    Wide excision malignant melanoma of right arm, 10 x 5 cm skin      excision.  Full-thickness skin graft to right arm skin and soft tissue      defect, 6 x 3 cm.  Right axillary sentinel lymph node biopsy.  2014-7-3: Cancer Related Surgery: Wide local excision and sentinel lymph node biopsy with 1 of 2 nodes positive for metastatic disease  2014-7-3: Cancer Related Surgery: Right axillary raji dissection with 0/42 nodes with evidence of disease  2014-9-2: Chemotherapy: commenced adjuvant high dose IFN alpha 2B     2020-9-14: New Treatment Plan: First dose Pembrolizumab 200mg IV every 3 weeks (5 doses, then missed 2 doses for concern of new findings on MRI)--Resumed #6 2/1/2021. Last dose 09/2022.    Subjective   Please refer to \"Notes/Cancer History\" above for complete History of present illness.     Ms. Tori Taylor     - Presents to clinic alone.  - Feels well overall.  - No constitutional symptoms.  - Continues to follow closely with derm for skin checks. Missed her last visit because she was sick but is going to reschedule asap.   - Daughter started college in [place]. Her son is in [place]. She is considering moving sometime in the future to be closer to them.      Review of Systems:   Review of Systems   Constitutional: Negative.    HENT:  Negative.          Allergies   Eyes: Negative.    Respiratory: Negative.   "   Cardiovascular: Negative.    Gastrointestinal: Negative.    Endocrine: Negative.    Genitourinary: Negative.     Musculoskeletal: Negative.         Mild lymphedema right arm since LND. Wears compression garment as needed   Skin: Negative.    Neurological: Negative.    Hematological: Negative.    Psychiatric/Behavioral: Negative.           MEDICAL HISTORY  Past Medical History:   Diagnosis Date    Lymphedema, not elsewhere classified 02/10/2020    Lymphedema    Malignant melanoma of right upper limb, including shoulder (Multi) 06/27/2016    Melanoma of right upper arm    Malignant melanoma of unspecified upper limb, including shoulder (Multi) 04/17/2020    Melanoma of upper limb    Nontoxic single thyroid nodule 05/12/2021    Right thyroid nodule    Other abnormal glucose 06/15/2020    Elevated glucose    Personal history of irradiation     History of radiation therapy    Personal history of malignant melanoma of skin     History of malignant melanoma    Personal history of other diseases of the respiratory system     Personal history of asthma    Personal history of other endocrine, nutritional and metabolic disease     History of hypothyroidism    Personal history of other medical treatment     H/O chest x-ray    Personal history of other specified conditions 10/21/2020    History of weight loss    Secondary malignant neoplasm of brain (Multi) 09/18/2020    Malignant melanoma metastatic to brain    Unspecified asthma with (acute) exacerbation ([facility]) 02/10/2020    Asthma with acute exacerbation       FAMILY HISTORY  Family History   Problem Relation Name Age of Onset    Deep vein thrombosis Mother  40    Diabetes Father         TOBACCO HISTORY  Tobacco Use: Medium Risk (8/19/2024)    Patient History     Smoking Tobacco Use: Former     Smokeless Tobacco Use: Never     Passive Exposure: Not on file       SOCIAL HISTORY  Social Connections: Not on file   , has a son and daughter. Works full time remotely  "for the [employer]      Outpatient Medication Profile:  Current Outpatient Medications on File Prior to Visit   Medication Sig Dispense Refill    acetaminophen (Tylenol) 500 mg tablet Take 1 tablet (500 mg) by mouth.      albuterol 90 mcg/actuation inhaler Inhale 2 puffs 4 times a day. 18 g 3    BD Luer-Juliana Syringe 3 mL 25 gauge x 1\" syringe USE 1 SYRINGE TWICE WEEKLY FOR B12 INJECTIONS 8 each 11    cholecalciferol (Vitamin D-3) 5,000 Units tablet Take 1 tablet (5,000 Units) by mouth once daily.      copper (ParaGard T 380A) 380 square mm IUD by intrauterine route.      cyanocobalamin (Vitamin B-12) 1,000 mcg/mL injection Inject 1 mL (1,000 mcg) into the muscle 2 times a week. 10 mL 0    estradiol (Climara) 0.05 mg/24 hr patch Place 1 patch over 7 days on the skin 1 (one) time per week. 8 patch 6    fexofenadine (Allegra) 180 mg tablet Take 1 tablet (180 mg) by mouth once daily as needed.      liraglutide, weight loss, (Saxenda) 3 mg/0.5 mL (18 mg/3 mL) pen injector injection Inject 0.5 mL (3 mg) under the skin once daily. 12 mL 0    mometasone (Nasonex) 50 mcg/actuation nasal spray Administer 1 spray into affected nostril(s) once daily.      progesterone (Prometrium) 100 mg capsule Take 1 capsule (100 mg) by mouth once daily at bedtime. 90 capsule 4    [DISCONTINUED] liraglutide, weight loss, (Saxenda) 3 mg/0.5 mL (18 mg/3 mL) pen injector injection Inject 0.5 mL (3 mg) under the skin once daily. 12 mL 0     No current facility-administered medications on file prior to visit.         Performance Status:  Asymptomatic     Vitals and Measurements:   Vitals:    08/22/24 1130   BP: 102/73   Pulse: 64   Resp: 16   Temp: 36 °C (96.8 °F)   TempSrc: Temporal   SpO2: 98%   Weight: 87.3 kg (192 lb 6.4 oz)   PainSc: 0-No pain        Physical Exam:   Physical Exam  Constitutional:       Appearance: Normal appearance.   HENT:      Head: Normocephalic and atraumatic.      Nose: Nose normal.      " Mouth/Throat:      Mouth: Mucous membranes are moist.      Pharynx: Oropharynx is clear.   Eyes:      Conjunctiva/sclera: Conjunctivae normal.   Cardiovascular:      Rate and Rhythm: Normal rate and regular rhythm.      Pulses: Normal pulses.      Heart sounds: Normal heart sounds.   Pulmonary:      Effort: Pulmonary effort is normal.      Breath sounds: Normal breath sounds.   Abdominal:      General: Bowel sounds are normal.      Palpations: Abdomen is soft.   Musculoskeletal:         General: Normal range of motion.      Cervical back: Neck supple.   Skin:     General: Skin is warm and dry.   Neurological:      General: No focal deficit present.      Mental Status: She is alert and oriented to person, place, and time.   Psychiatric:         Mood and Affect: Mood normal.         Behavior: Behavior normal.      Lab Results:  I have reviewed these laboratory results:     Lab Results   Component Value Date    WBC 5.1 08/15/2024    HGB 13.5 08/15/2024    HCT 41.3 08/15/2024    MCV 98 08/15/2024     08/15/2024         Chemistry    Lab Results   Component Value Date/Time     08/15/2024 1121    K 3.9 08/15/2024 1121     (H) 08/15/2024 1121    CO2 30 08/15/2024 1121    BUN 10 08/15/2024 1121    CREATININE 0.68 08/15/2024 1121    Lab Results   Component Value Date/Time    CALCIUM 9.1 08/15/2024 1121    ALKPHOS 33 08/15/2024 1121    AST 12 08/15/2024 1121    ALT 8 08/15/2024 1121    BILITOT 0.7 08/15/2024 1121        Lab Results   Component Value Date    TSH 2.31 08/15/2024    THYROIDPAB 29 09/07/2021        Radiology Result:  I have reviewed the latest Imaging in PACS and the findings are noted in this note. I discussed the results of the latest imaging with the patient. All previous imaging were reviewed at the time it was completed. Full records are available in the EMR for review as well.     STUDY:  MR BRAIN W AND WO IV CONTRAST; ;  8/9/2024 1:41 pm    IMPRESSION:  1. Postoperative changes from  resection of a mass located within the  left parieto-occipital lobes.      2. Circumferential enhancement surrounding the resection cavity is  unchanged, however there is increase in 7 mm focus of linear  enhancement located within the left parietal lobe located medial to  the surgical cavity. Appearance of this lesion is not typical for  metastasis and could reflect evolving posttreatment changes,  attention on follow-up imaging.      3. Otherwise, no intracranial masses or lesions.  =============================================  STUDY:  CT CHEST ABDOMEN PELVIS W IV CONTRAST;  8/9/2024 2:00 pm    IMPRESSION:  Melanoma restaging scan. When compared to the prior examination dated  04/29/2024, there has been no significant interval change and no  definite evidence of new metastatic disease. Additional stable  chronic and incidental findings described above.      Pathology Results:  I have reviewed the full pathology report recorded in the EMR. The pertinent portions indicating diagnosis are listed here in the note. for details please refer to the full report recorded in the EMR.    Surgical Pathology [Jul 10 2020 6:44PM] ([id number])  Specimens: LEFT BRAIN MASS /Received fresh for intraoperative consultation, labeled with the patient's   Name TORI TEMPLETON     Accession #: [id number]   Pathologist: BREE CAMARGO MD   Date of Procedure: 6/26/2020    Date Received: 6/26/2020   Date Reported 7/1/2020   Submitting Physician: WESTON OBREGON MD   Location: OR Other External #     FINAL DIAGNOSIS   A. LEFT BRAIN MASS, REMOVAL:   -METASTATIC MALIGNANT MELANOMA.     Note: SOX10 immunoreactivity is identified within tumor cell nuclei.     The gross and/or microscopic findings were reviewed in conjunction with pathology resident, Cheng Castro MD.      Assessment and Plan:   Assessment/Plan   Ms. Tori Templeton is a 43 y.o. female with a diagnosis of stage 4 melanoma with solitary metastasis to the  brain. She received resection of the met, followed by radiation (SBRT) and 2 years of Keytruda (last 09/2022). No recurrence to date. Please see the evolution of the case listed above in the cancer history.     We discussed that we will be conducting CT scan every 3 months till Sept 2024 and then every 6 months till Sept 2027 and then once a year till Sept 2032. Of course, this frequency will change according to the symptoms of the patient.    # Melanoma  - Currently on surveillance.   - CT CAP 08/09/2024 showing stable changes.   - RTC 02/20/2025 at [place].  - CT scan and labs prior on 02/17/2025.   - Continue skin checks with dermatology. Follows with [facility] in [place].    # Hx brain mets  - Follows with Dr. Musa in neuro oncology. Next visit 12/16/2024.  - Stable brain MRI 08/09/20224.    # Health maintenance  - Continues to follow with her PCP for wellness visits and age appropriate cancer screenings.   - Continue to follow with OBGYN.      DISCLAIMER:   In preparing for this visit and writing this note, I reviewed all the previous electronic medical records (labs, imaging and medical charts) of the patient available in the physician portal. Significant findings which helped in decision making are recorded  in this chart.     The plan was discussed with the patient. We gave her ample opportunities to ask questions. All questions were answered to her satisfaction and she verbalized understanding.

## 2024-08-22 NOTE — TELEPHONE ENCOUNTER
Patient calling requesting refill on saxenda   Patient takes daily asking for refill   Formatted review for accuracy

## 2024-08-23 RX ORDER — LIRAGLUTIDE 6 MG/ML
3 INJECTION, SOLUTION SUBCUTANEOUS DAILY
Qty: 15 ML | Refills: 1 | Status: SHIPPED | OUTPATIENT
Start: 2024-08-23 | End: 2024-10-22

## 2024-08-31 DIAGNOSIS — R23.2 VASOMOTOR FLUSHING: ICD-10-CM

## 2024-09-03 RX ORDER — ESTRADIOL 0.05 MG/D
1 PATCH TRANSDERMAL
Qty: 12 PATCH | Refills: 5 | Status: SHIPPED | OUTPATIENT
Start: 2024-09-08 | End: 2025-09-08

## 2024-09-11 DIAGNOSIS — J45.20 MILD INTERMITTENT ASTHMA, UNSPECIFIED WHETHER COMPLICATED (HHS-HCC): ICD-10-CM

## 2024-09-11 RX ORDER — ALBUTEROL SULFATE 90 UG/1
2 INHALANT RESPIRATORY (INHALATION)
Qty: 18 G | Refills: 3 | Status: SHIPPED | OUTPATIENT
Start: 2024-09-11

## 2024-09-11 NOTE — TELEPHONE ENCOUNTER
Patient is requesting refill on albuterol inhaler per patient she has been on albuterol for several years   Formatted

## 2024-09-26 ENCOUNTER — TELEMEDICINE (OUTPATIENT)
Dept: OBSTETRICS AND GYNECOLOGY | Facility: CLINIC | Age: 44
End: 2024-09-26
Payer: COMMERCIAL

## 2024-09-26 DIAGNOSIS — R23.2 VASOMOTOR FLUSHING: Primary | ICD-10-CM

## 2024-09-26 PROCEDURE — 99442 PR PHYS/QHP TELEPHONE EVALUATION 11-20 MIN: CPT | Performed by: OBSTETRICS & GYNECOLOGY

## 2024-09-26 RX ORDER — ESTRADIOL 0.06 MG/D
1 PATCH TRANSDERMAL
Qty: 12 PATCH | Refills: 4 | Status: SHIPPED | OUTPATIENT
Start: 2024-09-29

## 2024-09-26 NOTE — PROGRESS NOTES
GYN PROGRESS NOTE    Virtual or Telephone Consent    A telephone visit (audio only) between the patient (at the originating site) and the provider (at the distant site) was utilized to provide this telehealth service.   Verbal consent was requested and obtained from Tori Taylor on this date, 24 for a telehealth visit.   11 minute visit    Chief complaint: follow up    HPI:  Patient answers are not available for this visit.  - Initially she had great improvement but the about 2 weeks ago her vasomotor symptoms increased as well as sleeping issues.    Reviewed case with patient, reviewed plans.    Discussed the risk benefits of continuing Climara.    HISTORY:  Past Medical History:   Diagnosis Date    Lymphedema, not elsewhere classified 02/10/2020    Lymphedema    Malignant melanoma of right upper limb, including shoulder (Multi) 2016    Melanoma of right upper arm    Malignant melanoma of unspecified upper limb, including shoulder (Multi) 2020    Melanoma of upper limb    Nontoxic single thyroid nodule 2021    Right thyroid nodule    Other abnormal glucose 06/15/2020    Elevated glucose    Personal history of irradiation     History of radiation therapy    Personal history of malignant melanoma of skin     History of malignant melanoma    Personal history of other diseases of the respiratory system     Personal history of asthma    Personal history of other endocrine, nutritional and metabolic disease     History of hypothyroidism    Personal history of other medical treatment     H/O chest x-ray    Personal history of other specified conditions 10/21/2020    History of weight loss    Secondary malignant neoplasm of brain (Multi) 2020    Malignant melanoma metastatic to brain    Unspecified asthma with (acute) exacerbation ([facility]) 02/10/2020    Asthma with acute exacerbation     Past Surgical History:   Procedure Laterality Date     SECTION, CLASSIC  2014      Section    CHOLECYSTECTOMY  2014    Cholecystectomy    OTHER SURGICAL HISTORY  2020    Brain surgery    OTHER SURGICAL HISTORY  2014    Axillary Lymphadenectomy    OTHER SURGICAL HISTORY  2014    Excision Of Lesion Arms Malignant    OTHER SURGICAL HISTORY  2014    Lymphatic Surgery Intraoperative Identification Of [place] Node    OTHER SURGICAL HISTORY  03/10/2015    Laparoscopic Aspiration Left Ovary Cyst    OTHER SURGICAL HISTORY  2021    Thyroid surgery    OTHER SURGICAL HISTORY  2021    Thyroidectomy    SENTINEL LYMPH NODE BIOPSY  2014    [place] Lymph Node Biopsy     Social History     Socioeconomic History    Marital status:      Spouse name: Not on file    Number of children: Not on file    Years of education: Not on file    Highest education level: Not on file   Occupational History    Not on file   Tobacco Use    Smoking status: Former     Types: Cigarettes    Smokeless tobacco: Never   Substance and Sexual Activity    Alcohol use: Yes     Comment: social    Drug use: Never    Sexual activity: Yes     Partners: Male     Birth control/protection: I.U.D.   Other Topics Concern    Not on file   Social History Narrative    Not on file     Social Determinants of Health     Financial Resource Strain: Not on file   Food Insecurity: Not on file   Transportation Needs: Not on file   Physical Activity: Not on file   Stress: Not on file   Social Connections: Not on file   Intimate Partner Violence: Not on file   Housing Stability: Not on file     Cancer-related family history is not on file.       IMPRESSION/PLAN:  44 y.o. vasomotor flushing.    - Climara 0.06 mg patch weekly    Follow up as scheduled    Jose LEROY am scribing for virtually, and in the presence of Dr. Robbin Taylor on  24 at 6:57 PM     Robbin Taylor MD

## 2024-09-28 LAB — NONINV COLON CA DNA+OCC BLD SCRN STL QL: NORMAL

## 2024-10-07 LAB — NONINV COLON CA DNA+OCC BLD SCRN STL QL: NEGATIVE

## 2024-10-18 ENCOUNTER — APPOINTMENT (OUTPATIENT)
Dept: PRIMARY CARE | Facility: CLINIC | Age: 44
End: 2024-10-18
Payer: COMMERCIAL

## 2024-10-18 VITALS
HEART RATE: 76 BPM | HEIGHT: 68 IN | WEIGHT: 189 LBS | BODY MASS INDEX: 28.64 KG/M2 | SYSTOLIC BLOOD PRESSURE: 110 MMHG | OXYGEN SATURATION: 97 % | DIASTOLIC BLOOD PRESSURE: 75 MMHG | TEMPERATURE: 97.4 F

## 2024-10-18 DIAGNOSIS — E53.8 VITAMIN B12 DEFICIENCY: ICD-10-CM

## 2024-10-18 DIAGNOSIS — Z79.899 MEDICATION MANAGEMENT: ICD-10-CM

## 2024-10-18 DIAGNOSIS — E03.9 ACQUIRED HYPOTHYROIDISM: ICD-10-CM

## 2024-10-18 DIAGNOSIS — E66.3 OVERWEIGHT WITH BODY MASS INDEX (BMI) OF 28 TO 28.9 IN ADULT: Primary | ICD-10-CM

## 2024-10-18 DIAGNOSIS — J45.20 MILD INTERMITTENT ASTHMA, UNSPECIFIED WHETHER COMPLICATED (HHS-HCC): ICD-10-CM

## 2024-10-18 DIAGNOSIS — E66.811 CLASS 1 OBESITY WITH BODY MASS INDEX (BMI) OF 31.0 TO 31.9 IN ADULT, UNSPECIFIED OBESITY TYPE, UNSPECIFIED WHETHER SERIOUS COMORBIDITY PRESENT: ICD-10-CM

## 2024-10-18 DIAGNOSIS — E55.9 VITAMIN D DEFICIENCY: ICD-10-CM

## 2024-10-18 PROCEDURE — 3008F BODY MASS INDEX DOCD: CPT | Performed by: INTERNAL MEDICINE

## 2024-10-18 PROCEDURE — 99214 OFFICE O/P EST MOD 30 MIN: CPT | Performed by: INTERNAL MEDICINE

## 2024-10-18 PROCEDURE — 1036F TOBACCO NON-USER: CPT | Performed by: INTERNAL MEDICINE

## 2024-10-18 RX ORDER — CYANOCOBALAMIN 1000 UG/ML
1000 INJECTION, SOLUTION INTRAMUSCULAR; SUBCUTANEOUS 2 TIMES WEEKLY
Qty: 10 ML | Refills: 0 | Status: SHIPPED | OUTPATIENT
Start: 2024-10-21 | End: 2025-10-21

## 2024-10-18 RX ORDER — LIRAGLUTIDE 6 MG/ML
3 INJECTION, SOLUTION SUBCUTANEOUS DAILY
Qty: 15 ML | Refills: 1 | Status: SHIPPED | OUTPATIENT
Start: 2024-10-18 | End: 2024-12-17

## 2024-10-18 RX ORDER — ACETAMINOPHEN 160 MG/5ML
SUSPENSION, ORAL (FINAL DOSE FORM) ORAL
Qty: 15 EACH | Refills: 1 | Status: SHIPPED | OUTPATIENT
Start: 2024-10-18 | End: 2025-10-18

## 2024-10-18 ASSESSMENT — PATIENT HEALTH QUESTIONNAIRE - PHQ9
SUM OF ALL RESPONSES TO PHQ9 QUESTIONS 1 AND 2: 0
1. LITTLE INTEREST OR PLEASURE IN DOING THINGS: NOT AT ALL
2. FEELING DOWN, DEPRESSED OR HOPELESS: NOT AT ALL

## 2024-10-18 NOTE — PROGRESS NOTES
"Subjective   Patient ID: Tori Taylor is a 44 y.o. female who presents for Follow-up (Weight loss medication/Refill for B12 and syringes ).  HPI  Would like wt lower  Discussed dtr  Started probiotic  fees a lot better  Now not as bloated  Clothes fitting better  Does not count calories  Difficult to cook for one  Grilled chicken  [place]      Review of Systems  Gen:  no fever  HEENT:  no trouble swallowing  CV:  no dyspnea, cyanosis  Lungs:  no shortness of breath  GI:  no constipation, no blood in stool  Vascular:  no edema  Neuro:   no weakness  Skin:  no rash  MS:no joint swelling  Gu:  no urinary complaints  All other systems have been reviewed and are negative for complaint    /75   Pulse 76   Temp 36.3 °C (97.4 °F) (Temporal)   Ht 1.727 m (5' 8\")   Wt 85.7 kg (189 lb)   SpO2 97%   BMI 28.74 kg/m²   Objective   Physical Exam  Lab Results   Component Value Date    WBC 5.1 08/15/2024    HGB 13.5 08/15/2024    HCT 41.3 08/15/2024    MCV 98 08/15/2024     08/15/2024     Lab Results   Component Value Date    GLUCOSE 72 (L) 08/15/2024    CALCIUM 9.1 08/15/2024     08/15/2024    K 3.9 08/15/2024    CO2 30 08/15/2024     (H) 08/15/2024    BUN 10 08/15/2024    CREATININE 0.68 08/15/2024     Social History     Socioeconomic History    Marital status:    Tobacco Use    Smoking status: Former     Types: Cigarettes    Smokeless tobacco: Never   Substance and Sexual Activity    Alcohol use: Yes     Comment: social    Drug use: Never    Sexual activity: Yes     Partners: Male     Birth control/protection: I.U.D.     Family History   Problem Relation Name Age of Onset    Deep vein thrombosis Mother  40    Diabetes Father         General:  Alert and in  NAD  Lungs, CTAB  Skin:  no suspicious lesions,  warm and dry  Head :  Normocephalic  Neck/thyroid:  neck supple, full rom, no cervical lymphadenopathy  no thyromegaly  Heart:  RRR  no murmurs  Abdomen:  Normal , bs present, soft, " "nontender, not distended, no masses palpated  Extremities:  No clubbing, cyanosis, or edema  Neurologic:  Nonfocal  Psych: alert, normal mood      Tori was seen today for follow-up.  Diagnoses and all orders for this visit:  Overweight with body mass index (BMI) of 28 to 28.9 in adult (Primary)  Mild intermittent asthma, unspecified whether complicated (HHS-HCC)  Vitamin D deficiency  Medication management  Acquired hypothyroidism  Vitamin B12 deficiency  -     cyanocobalamin (Vitamin B-12) 1,000 mcg/mL injection; Inject 1 mL (1,000 mcg) into the muscle 2 times a week.  -     insulin syringe-needle U-100 30G X 1/2\" 0.3 mL syringe; Use Once monthly for b 12 shot  Class 1 obesity with body mass index (BMI) of 31.0 to 31.9 in adult, unspecified obesity type, unspecified whether serious comorbidity present  -     liraglutide, weight loss, (Saxenda) 3 mg/0.5 mL (18 mg/3 mL) pen injector injection; Inject 0.5 mL (3 mg) under the skin once daily.    Chronic conditions reviewed in the assessment and plan.    Continue medications unless specified otherwise.  Previous labs reviewed.   Other specialty provider notes reviewed.   Follow up in 3 months or prn.  Has mamm order     "

## 2024-10-21 ENCOUNTER — TELEPHONE (OUTPATIENT)
Dept: PRIMARY CARE | Facility: CLINIC | Age: 44
End: 2024-10-21
Payer: COMMERCIAL

## 2024-10-21 DIAGNOSIS — E66.3 OVERWEIGHT WITH BODY MASS INDEX (BMI) OF 28 TO 28.9 IN ADULT: Primary | ICD-10-CM

## 2024-10-21 RX ORDER — TIRZEPATIDE 5 MG/.5ML
5 INJECTION, SOLUTION SUBCUTANEOUS WEEKLY
Qty: 2 ML | Refills: 1 | Status: SHIPPED | OUTPATIENT
Start: 2024-10-21

## 2024-10-21 NOTE — TELEPHONE ENCOUNTER
Insurance company is covering mounjaro instead of saxenda   Patient asking if mounjaro can be sent into pharmacy

## 2024-10-23 DIAGNOSIS — R23.2 VASOMOTOR FLUSHING: ICD-10-CM

## 2024-10-23 RX ORDER — ESTRADIOL 0.07 MG/D
1 PATCH TRANSDERMAL WEEKLY
Qty: 12 PATCH | Refills: 3 | Status: SHIPPED | OUTPATIENT
Start: 2024-10-23 | End: 2025-10-23

## 2024-11-08 ENCOUNTER — TELEPHONE (OUTPATIENT)
Dept: PRIMARY CARE | Facility: CLINIC | Age: 44
End: 2024-11-08
Payer: COMMERCIAL

## 2024-11-08 DIAGNOSIS — E66.3 OVERWEIGHT WITH BODY MASS INDEX (BMI) OF 28 TO 28.9 IN ADULT: ICD-10-CM

## 2024-11-19 ENCOUNTER — HOSPITAL ENCOUNTER (OUTPATIENT)
Dept: RADIOLOGY | Facility: CLINIC | Age: 44
Discharge: HOME | End: 2024-11-19
Payer: COMMERCIAL

## 2024-11-19 ENCOUNTER — OFFICE VISIT (OUTPATIENT)
Dept: PRIMARY CARE | Facility: CLINIC | Age: 44
End: 2024-11-19
Payer: COMMERCIAL

## 2024-11-19 VITALS
BODY MASS INDEX: 29.65 KG/M2 | DIASTOLIC BLOOD PRESSURE: 57 MMHG | TEMPERATURE: 97.5 F | SYSTOLIC BLOOD PRESSURE: 107 MMHG | WEIGHT: 195 LBS | OXYGEN SATURATION: 97 % | HEART RATE: 56 BPM

## 2024-11-19 DIAGNOSIS — E53.8 VITAMIN B12 DEFICIENCY: ICD-10-CM

## 2024-11-19 DIAGNOSIS — E03.9 ACQUIRED HYPOTHYROIDISM: ICD-10-CM

## 2024-11-19 DIAGNOSIS — L03.113 CELLULITIS OF RIGHT UPPER EXTREMITY: Primary | ICD-10-CM

## 2024-11-19 DIAGNOSIS — L03.113 CELLULITIS OF RIGHT UPPER EXTREMITY: ICD-10-CM

## 2024-11-19 PROCEDURE — 93971 EXTREMITY STUDY: CPT | Performed by: RADIOLOGY

## 2024-11-19 PROCEDURE — 93971 EXTREMITY STUDY: CPT

## 2024-11-19 PROCEDURE — 99213 OFFICE O/P EST LOW 20 MIN: CPT | Performed by: INTERNAL MEDICINE

## 2024-11-19 RX ORDER — CEPHALEXIN 500 MG/1
500 CAPSULE ORAL 2 TIMES DAILY
Qty: 20 CAPSULE | Refills: 0 | Status: SHIPPED | OUTPATIENT
Start: 2024-11-19 | End: 2024-11-29

## 2024-11-19 NOTE — PROGRESS NOTES
Subjective   Patient ID: Tori Taylor is a 44 y.o. female who presents for Follow-up (Right forearm cellulitus).  HPI    R arm bite  No fever  Rash  Swollen  Red  Took benadryl    Review of Systems  Gen:  no fever  HEENT:  no trouble swallowing  CV:  no dyspnea, cyanosis  Lungs:  no shortness of breath  GI:  no constipation, no blood in stool   Neuro:   no weakness   MS:no joint swelling  Gu:  no urinary complaints  All other systems have been reviewed and are negative for complaint    /57   Pulse 56   Temp 36.4 °C (97.5 °F)   Wt 88.5 kg (195 lb)   SpO2 97%   BMI 29.65 kg/m²   Objective   Physical Exam  Lab Results   Component Value Date    WBC 5.1 08/15/2024    HGB 13.5 08/15/2024    HCT 41.3 08/15/2024    MCV 98 08/15/2024     08/15/2024     Lab Results   Component Value Date    GLUCOSE 72 (L) 08/15/2024    CALCIUM 9.1 08/15/2024     08/15/2024    K 3.9 08/15/2024    CO2 30 08/15/2024     (H) 08/15/2024    BUN 10 08/15/2024    CREATININE 0.68 08/15/2024     Social History     Socioeconomic History    Marital status:    Tobacco Use    Smoking status: Former     Types: Cigarettes    Smokeless tobacco: Never   Substance and Sexual Activity    Alcohol use: Yes     Comment: social    Drug use: Never    Sexual activity: Yes     Partners: Male     Birth control/protection: I.U.D.     Family History   Problem Relation Name Age of Onset    Deep vein thrombosis Mother  40    Other (benign breast tumor) Mother      Diabetes Father         General:  Alert and in  NAD  Lungs, CTAB  Skin:  no suspicious lesions,  warm and dry   Head :  Normocephalic  Neck/thyroid:  neck supple, full rom, no cervical lymphadenopathy  no thyromegaly  Heart:  RRR  no murmurs  Abdomen:  Normal , bs present, soft, nontender, not distended, no masses palpated  Extremities:  No clubbing, cyanosis,    Nl ue pulses   R arm mild edema and redness distal to elbow  Full rom of elbow  Scar from prev surgery    Neurologic:  Nonfocal  Psych: alert, normal mood      Tori was seen today for follow-up.  Diagnoses and all orders for this visit:  Cellulitis of right upper extremity (Primary)  -     Vascular US upper extremity venous duplex right; Future  -     cephalexin (Keflex) 500 mg capsule; Take 1 capsule (500 mg) by mouth 2 times a day for 10 days.  Vitamin B12 deficiency  Acquired hypothyroidism    Follow up if symptoms do not resolve or worsen.

## 2024-11-25 DIAGNOSIS — R23.2 VASOMOTOR FLUSHING: Primary | ICD-10-CM

## 2024-11-25 RX ORDER — ESTRADIOL 0.1 MG/D
1 PATCH TRANSDERMAL
Qty: 4 PATCH | Refills: 12 | Status: SHIPPED | OUTPATIENT
Start: 2024-12-01

## 2024-12-05 ENCOUNTER — HOSPITAL ENCOUNTER (OUTPATIENT)
Dept: RADIOLOGY | Facility: CLINIC | Age: 44
Discharge: HOME | End: 2024-12-05
Payer: COMMERCIAL

## 2024-12-05 DIAGNOSIS — C79.31 MALIGNANT MELANOMA METASTATIC TO BRAIN (MULTI): ICD-10-CM

## 2024-12-05 DIAGNOSIS — Z12.31 ENCOUNTER FOR SCREENING MAMMOGRAM FOR MALIGNANT NEOPLASM OF BREAST: ICD-10-CM

## 2024-12-05 PROCEDURE — A9575 INJ GADOTERATE MEGLUMI 0.1ML: HCPCS | Performed by: PSYCHIATRY & NEUROLOGY

## 2024-12-05 PROCEDURE — 77067 SCR MAMMO BI INCL CAD: CPT

## 2024-12-05 PROCEDURE — 77067 SCR MAMMO BI INCL CAD: CPT | Performed by: RADIOLOGY

## 2024-12-05 PROCEDURE — 2550000001 HC RX 255 CONTRASTS: Performed by: PSYCHIATRY & NEUROLOGY

## 2024-12-05 PROCEDURE — 77063 BREAST TOMOSYNTHESIS BI: CPT | Performed by: RADIOLOGY

## 2024-12-05 PROCEDURE — 70553 MRI BRAIN STEM W/O & W/DYE: CPT

## 2024-12-05 PROCEDURE — 70553 MRI BRAIN STEM W/O & W/DYE: CPT | Performed by: RADIOLOGY

## 2024-12-05 RX ORDER — GADOTERATE MEGLUMINE 376.9 MG/ML
17 INJECTION INTRAVENOUS
Status: COMPLETED | OUTPATIENT
Start: 2024-12-05 | End: 2024-12-05

## 2024-12-06 DIAGNOSIS — R92.8 ABNORMAL MAMMOGRAM: Primary | ICD-10-CM

## 2024-12-10 ENCOUNTER — HOSPITAL ENCOUNTER (OUTPATIENT)
Dept: RADIOLOGY | Facility: HOSPITAL | Age: 44
Discharge: HOME | End: 2024-12-10
Payer: COMMERCIAL

## 2024-12-10 DIAGNOSIS — R92.8 ABNORMAL MAMMOGRAM: ICD-10-CM

## 2024-12-10 PROCEDURE — 77061 BREAST TOMOSYNTHESIS UNI: CPT | Mod: RIGHT SIDE | Performed by: RADIOLOGY

## 2024-12-10 PROCEDURE — 77065 DX MAMMO INCL CAD UNI: CPT | Mod: RIGHT SIDE | Performed by: RADIOLOGY

## 2024-12-10 PROCEDURE — 77065 DX MAMMO INCL CAD UNI: CPT | Mod: RT

## 2024-12-16 ENCOUNTER — TELEMEDICINE (OUTPATIENT)
Dept: HEMATOLOGY/ONCOLOGY | Facility: HOSPITAL | Age: 44
End: 2024-12-16
Payer: COMMERCIAL

## 2024-12-16 DIAGNOSIS — C79.31 MALIGNANT MELANOMA METASTATIC TO BRAIN (MULTI): ICD-10-CM

## 2024-12-16 PROCEDURE — 99213 OFFICE O/P EST LOW 20 MIN: CPT

## 2024-12-16 NOTE — PROGRESS NOTES
[facility]  Neuro-Oncology    Patient: Tori Taylor  MRN: [id number]  Date of visit: 12/16/24  Visit type: Virtual visit  Clinician: Dia Corona PA-C    Cancer History:          Skin - Melanoma         AJCC Edition: 7th, Diagnosis Date: 16-Jun-2014, Stage III, T1a N1a M0          Melanoma of the Skin         AJCC Edition: 8th (AJCC), Diagnosis Date: 28-Jun-2020, Stage IV, pTX cN1 cM1d(1)      Treatment Synopsis:    --History of metastatic melanoma diagnosed in 2014 s/p local excision of right forearm with partial thickness flap and axillary LND s/p 10 months  of adjuvant interferon alpha therapy, stopped due to thyroiditis.  --Solitary brain metastasis left parietal lobe, status post left-sided craniotomy for hemorrhagic tumor resection and duraplasty with pericranial graft by Dr. Ruth Veliz  on 6/26/2020.  --Completed adjuvant SBRT therapy post-resection site by Dr. Leticia Mendoza--dates 7/27-31/2020  --Pending PET/CT for 4 mm solitary RUL pulmonary nodule, sub cm liver lesion, and enlarged left adrenal gland.  --8/7/2020:  Discussion for immunotherapy vs. 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  --8/21/2020: Discussed results of 8/13/2020 PET/CT, discuss immunotherapy options after tumor board meeting 8/17/2020.  Consent for Pembrolizumab.  --Patient received 5 Pembrolizumab. infusions since September 2020.  Hospitalized for confusion episode concerning for focal seizure.  MRI more  suggesting of immunotherapy flare rather than progression of disease (missed last 2 doses due to symptoms).  --Plan to continue Keppra and resumed Pembrolizumab 2/1/2021  --Restaging scans 5/2022 with ALMA in brain or PET CT C/A/P  --Completed 2 years of Pembrolizumab, restaging  --Followed with serial imaging.  -- 7/2023: CT C/A/P with an enlarging 1 cm liver lesion, uncertain etiology.  Stable pulmonary nodule.  Cervical cyst for which she will see gynecology.  No changes in MRI  "brain.     Treatment History:    2014-7-3: Cancer Related Surgery: OPERATION/PROCEDURE:    Wide excision malignant melanoma of right arm, 10 x 5 cm skin      excision.  Full-thickness skin graft to right arm skin and soft tissue      defect, 6 x 3 cm.  Right axillary sentinel lymph node biopsy.  2014-7-3: Cancer Related Surgery: Wide local excision and sentinel lymph node biopsy with 1 of 2 nodes positive for metastatic disease  2014-7-3: Cancer Related Surgery: Right axillary raji dissection with 0/42 nodes with evidence of disease  2014-9-2: Chemotherapy: commenced adjuvant high dose IFN alpha 2B     2020-9-14: New Treatment Plan: First dose Pembrolizumab 200mg IV every 3 weeks (5 doses, then missed 2 doses for concern of new findings on MRI)--Resumed #6 2/1/2021    Interval History (NEWEST at BOTTOM of section):  1/5/21: This is a 41-year-old woman who presents for evaluation after an episode of altered awareness occurring on 12/6/2020.  She states that the episode initially started with flashing lights out  of her right eye, did not notice any out of her left eye, which was followed by an extended period of approximately 45 minutes of confusion. She also experienced confusion which she described as brain \"fogginess\". She slowly returned to her baseline.  She was seen in the emergency department and CT head done at that time did not demonstrate any acute changes. A follow-up MRI occurring on 12/16/2020 demonstrated 3 new ovoid enhancing foci with flair hyperintensity within the brain parenchyma just deep  to the resection cavity. These were felt to potentially represent therapy effect however metastases were not excluded. She was discussed at tumor board and recommended for advanced imaging and EEG. A spot EEG was done and did not demonstrate any epileptiform  discharges. Advanced imaging is still pending. She presents here for further evaluation.     Since her episode the patient has not noticed any new " neurological symptoms. She has not had any recurrence of her visual changes. 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 No weakness numbness headaches or speech difficulties. Her pembrolizumab is currently on hold per Dr. Caceres.     4/4/23: The patient is present virtually for follow up. She is feeling well.  Lost weight intentionally.  No significant complaints.  Asking about  tapering off of Keppra.     2/19/2024: Since the last visit with Dr. Vega, she has been doing very well neurologically. She denies any headaches, seizures, speech issues, visual loss, new focal weakness, gait difficulty, or sensory loss. She remains off of Pembrolizumab and is followed by Dr. Welch for the melanoma. Since she never had a verified seizure in the past, and the recent seizure work-up was negative, she is tapering off of the Keppra slowly per Dr. Vega -- now on Keppra 500 mg every other day. She continues to work full time from home as an  for the Department of [employer] in [facility].     5/13/2024: Since the last visit, she continues to do very well neurologically. She denies any headaches, seizures, speech issues, visual loss, new focal weakness, gait difficulty, or sensory loss. She remains off of Pembrolizumab and is followed by Dr. Welch for the melanoma; new CT scans pending for August. Since she never had a verified seizure in the past, and the recent seizure work-up was negative, she has been tapering off of the Keppra -- totally discontinued in early April. She continues to work full time from home as an  for the [employer] of [employer] in [facility].     8/19/2024: Since the last visit, she continues to do very well neurologically. She denies any headaches, seizures, speech issues, visual loss, new focal weakness, gait difficulty, or sensory loss. She is getting over a viral URI,  and still has some sinus congestion. She remains off of Pembrolizumab and is followed by Dr. Welch for the melanoma. Since she never had a verified seizure in the past, and the recent seizure work-up was negative, she has been tapered off of Keppra. She continues to work full time from home as an  for the Department of [employer] in [facility].    12/16/24: Tori Taylor presents to neuro-oncology clinic today for a 4-month surveillance follow-up. She is well overall and has no new neurological concerns. Denies headaches, seizures, numbness/tingling, speech changes, focal weakness, and gait difficulty. She has an appointment with Gen Surgery soon for evaluation and subsequent biopsy of abnormal microcalcifications within her R breast.    Review of Systems:  Review of Systems   Constitutional:  Negative for chills and fever.   Gastrointestinal:  Negative for nausea and vomiting.   Musculoskeletal:  Negative for gait problem.   Neurological:  Negative for extremity weakness, gait problem, headaches, numbness, seizures and speech difficulty.       Social History:  Social History     Tobacco Use    Smoking status: Former     Types: Cigarettes    Smokeless tobacco: Never   Substance Use Topics    Alcohol use: Yes     Comment: social    Drug use: Never       Past Medical History:  Past Medical History:   Diagnosis Date    Lymphedema, not elsewhere classified 02/10/2020    Lymphedema    Malignant melanoma of right upper limb, including shoulder (Multi) 06/27/2016    Melanoma of right upper arm    Malignant melanoma of unspecified upper limb, including shoulder (Multi) 04/17/2020    Melanoma of upper limb    Nontoxic single thyroid nodule 05/12/2021    Right thyroid nodule    Other abnormal glucose 06/15/2020    Elevated glucose    Personal history of irradiation     History of radiation therapy    Personal history of malignant melanoma of skin     History of malignant melanoma    Personal history of  other diseases of the respiratory system     Personal history of asthma    Personal history of other endocrine, nutritional and metabolic disease     History of hypothyroidism    Personal history of other medical treatment     H/O chest x-ray    Personal history of other specified conditions 10/21/2020    History of weight loss    Secondary malignant neoplasm of brain (Multi) 2020    Malignant melanoma metastatic to brain    Unspecified asthma with (acute) exacerbation ([facility]) 02/10/2020    Asthma with acute exacerbation       Past Surgical History:  Past Surgical History:   Procedure Laterality Date     SECTION, CLASSIC  2014     Section    CHOLECYSTECTOMY  2014    Cholecystectomy    OTHER SURGICAL HISTORY  2020    Brain surgery    OTHER SURGICAL HISTORY  2014    Axillary Lymphadenectomy    OTHER SURGICAL HISTORY  2014    Excision Of Lesion Arms Malignant    OTHER SURGICAL HISTORY  2014    Lymphatic Surgery Intraoperative Identification Of [place] Node    OTHER SURGICAL HISTORY  03/10/2015    Laparoscopic Aspiration Left Ovary Cyst    OTHER SURGICAL HISTORY  2021    Thyroid surgery    OTHER SURGICAL HISTORY  2021    Thyroidectomy    SENTINEL LYMPH NODE BIOPSY  2014    [place] Lymph Node Biopsy       Family History:  Family History   Problem Relation Name Age of Onset    Deep vein thrombosis Mother  40    Other (benign breast tumor) Mother      Diabetes Father         Vital Signs:  There were no vitals taken for this visit. -- virtual    Physical Exam:  Physical Exam  Constitutional:       General: She is not in acute distress.     Appearance: She is not toxic-appearing.   HENT:      Head: Normocephalic and atraumatic.   Eyes:      Extraocular Movements: Extraocular movements intact.      Conjunctiva/sclera: Conjunctivae normal.   Pulmonary:      Effort: Pulmonary effort is normal. No respiratory distress.   Musculoskeletal:       Cervical back: Neck supple.   Neurological:      General: No focal deficit present.      Mental Status: She is alert and oriented to person, place, and time.      Cranial Nerves: No cranial nerve deficit.   Psychiatric:         Mood and Affect: Mood normal.         Behavior: Behavior normal.         Results:  Lab Results   Component Value Date    WBC 5.1 08/15/2024    HGB 13.5 08/15/2024    HCT 41.3 08/15/2024    MCV 98 08/15/2024     08/15/2024       Lab Results   Component Value Date    GLUCOSE 72 (L) 08/15/2024    CALCIUM 9.1 08/15/2024     08/15/2024    K 3.9 08/15/2024    CO2 30 08/15/2024     (H) 08/15/2024    BUN 10 08/15/2024    CREATININE 0.68 08/15/2024       Lab Results   Component Value Date    ALT 8 08/15/2024    AST 12 08/15/2024    ALKPHOS 33 08/15/2024    BILITOT 0.7 08/15/2024       === 12/05/24 ===    MR BRAIN W AND WO CONTRAST    - Impression -  There has been no significant interval change when compared with the  prior MRI dated 08/09/2024 as described above.    MACRO:  None.    Signed by: Dave Portillo 12/5/2024 3:11 PM  Dictation workstation:   [id number]     Assessment & Plan:  Tori Taylor is a 44 y.o. female with h/o melanoma (dx 2014) and brain metastases s/p L craniotomy for hemorrhagic tumor resection and duraplasty with pericranial graft (6/26/2020), adjuvant SBRT (7/27-31/2020), and 2 years of pembrolizumab (completed 9/26/22). She is currently followed with serial imaging.    Reviewed most recent imaging showing no significant changes. Patient doing well from neuro perspective. Continue with surveillance.    1. Metastatic melanoma to brain  Continue imaging surveillance with MRI brain every 4-6 months indefinitely  Follow-up with neuro-oncology in about 5 months after new MRI  Contact office sooner if any neuro symptoms/concerns arise      Orders Placed This Encounter   Procedures    MR brain w and wo IV contrast

## 2024-12-18 ENCOUNTER — DOCUMENTATION (OUTPATIENT)
Dept: SURGERY | Facility: HOSPITAL | Age: 44
End: 2024-12-18
Payer: COMMERCIAL

## 2024-12-18 NOTE — PROGRESS NOTES
Breast navigator:    I spoke with the patient regarding her abnormal mammogram and radiologist recommendations for breast biopsy. Patient was given handouts and questions addressed. She has elected Dr. Norton as her surgeon. Appointment and biopsy scheduled. Dr. Taylor notified.

## 2024-12-24 ENCOUNTER — APPOINTMENT (OUTPATIENT)
Dept: RADIOLOGY | Facility: HOSPITAL | Age: 44
End: 2024-12-24
Payer: COMMERCIAL

## 2024-12-27 ENCOUNTER — OFFICE VISIT (OUTPATIENT)
Dept: SURGERY | Facility: HOSPITAL | Age: 44
End: 2024-12-27
Payer: COMMERCIAL

## 2024-12-27 VITALS
DIASTOLIC BLOOD PRESSURE: 60 MMHG | WEIGHT: 190 LBS | BODY MASS INDEX: 28.79 KG/M2 | SYSTOLIC BLOOD PRESSURE: 100 MMHG | HEIGHT: 68 IN

## 2024-12-27 DIAGNOSIS — R92.8 ABNORMAL MAMMOGRAM: Primary | ICD-10-CM

## 2024-12-27 PROCEDURE — 3008F BODY MASS INDEX DOCD: CPT | Performed by: SURGERY

## 2024-12-27 PROCEDURE — 99205 OFFICE O/P NEW HI 60 MIN: CPT | Performed by: SURGERY

## 2024-12-27 PROCEDURE — 99215 OFFICE O/P EST HI 40 MIN: CPT | Performed by: SURGERY

## 2024-12-27 ASSESSMENT — ENCOUNTER SYMPTOMS
EYES NEGATIVE: 1
PSYCHIATRIC NEGATIVE: 1
NEUROLOGICAL NEGATIVE: 1
RESPIRATORY NEGATIVE: 1
ENDOCRINE NEGATIVE: 1
HEMATOLOGIC/LYMPHATIC NEGATIVE: 1
MUSCULOSKELETAL NEGATIVE: 1
CARDIOVASCULAR NEGATIVE: 1
GASTROINTESTINAL NEGATIVE: 1
CONSTITUTIONAL NEGATIVE: 1
ALLERGIC/IMMUNOLOGIC NEGATIVE: 1

## 2024-12-27 NOTE — PROGRESS NOTES
Subjective   Patient ID: Tori Taylor is a 44 y.o. female who presents for New Patient Visit (Here for biopsy consult).  HPI  Right diagnostic mammogram 12/10/2024 following category 0 screening showed indeterminate microcalcifications lower outer quadrant spanning from the subareolar region to a posterior depth.  Cross anteriorly to the lower inner quadrant, 10 cm span.  Heterogenous and pleomorphic.  Category 4.    History of right upper extremity melanoma approximately 10 years ago with wide excision and right axillary sentinel node biopsy subsequent axillary node dissection for a positive raji disease.  Adjuvant interferon  3 years ago status post excision of brain metastasis, adjuvant radiation and immunotherapy.  Notes residual mild lymphedema in the right upper extremity, wears a sleeve as needed with excessive activity that can trigger swelling    Breast history:  G3, P2 first age 23, BF x6 months each  No family history  Perimenopausal, menarche age 11  No hormone exposure    Review of Systems   Constitutional: Negative.    HENT: Negative.     Eyes: Negative.    Respiratory: Negative.     Cardiovascular: Negative.    Gastrointestinal: Negative.    Endocrine: Negative.    Genitourinary: Negative.    Musculoskeletal: Negative.    Skin: Negative.    Allergic/Immunologic: Negative.    Neurological: Negative.    Hematological: Negative.    Psychiatric/Behavioral: Negative.           Past Medical History:   Diagnosis Date    Lymphedema, not elsewhere classified 02/10/2020    Lymphedema    Malignant melanoma of right upper limb, including shoulder (Multi) 06/27/2016    Melanoma of right upper arm    Malignant melanoma of unspecified upper limb, including shoulder (Multi) 04/17/2020    Melanoma of upper limb    Nontoxic single thyroid nodule 05/12/2021    Right thyroid nodule    Other abnormal glucose 06/15/2020    Elevated glucose    Personal history of irradiation     History of radiation therapy     Personal history of malignant melanoma of skin     History of malignant melanoma    Personal history of other diseases of the respiratory system     Personal history of asthma    Personal history of other endocrine, nutritional and metabolic disease     History of hypothyroidism    Personal history of other medical treatment     H/O chest x-ray    Personal history of other specified conditions 10/21/2020    History of weight loss    Secondary malignant neoplasm of brain (Multi) 2020    Malignant melanoma metastatic to brain    Unspecified asthma with (acute) exacerbation ([facility]) 02/10/2020    Asthma with acute exacerbation     Past Surgical History:   Procedure Laterality Date     SECTION, CLASSIC  2014     Section    CHOLECYSTECTOMY  2014    Cholecystectomy    OTHER SURGICAL HISTORY  2020    Brain surgery    OTHER SURGICAL HISTORY  2014    Axillary Lymphadenectomy    OTHER SURGICAL HISTORY  2014    Excision Of Lesion Arms Malignant    OTHER SURGICAL HISTORY  2014    Lymphatic Surgery Intraoperative Identification Of [place] Node    OTHER SURGICAL HISTORY  03/10/2015    Laparoscopic Aspiration Left Ovary Cyst    OTHER SURGICAL HISTORY  2021    Thyroid surgery    OTHER SURGICAL HISTORY  2021    Thyroidectomy    SENTINEL LYMPH NODE BIOPSY  2014    [place] Lymph Node Biopsy     Family History   Problem Relation Name Age of Onset    Deep vein thrombosis Mother  40    Other (benign breast tumor) Mother      Diabetes Father        Social History     Socioeconomic History    Marital status:    Tobacco Use    Smoking status: Former     Types: Cigarettes    Smokeless tobacco: Never   Substance and Sexual Activity    Alcohol use: Yes     Comment: social    Drug use: Never    Sexual activity: Yes     Partners: Male     Birth control/protection: I.U.D.          Current Outpatient Medications:     acetaminophen (Tylenol) 500 mg tablet, Take 1  "tablet (500 mg) by mouth., Disp: , Rfl:     albuterol 90 mcg/actuation inhaler, Inhale 2 puffs 4 times a day., Disp: 18 g, Rfl: 3    BD Luer-Juliana Syringe 3 mL 25 gauge x 1\" syringe, USE 1 SYRINGE TWICE WEEKLY FOR B12 INJECTIONS, Disp: 8 each, Rfl: 11    cholecalciferol (Vitamin D-3) 5,000 Units tablet, Take 1 tablet (5,000 Units) by mouth once daily., Disp: , Rfl:     copper (ParaGard T 380A) 380 square mm IUD, by intrauterine route., Disp: , Rfl:     cyanocobalamin (Vitamin B-12) 1,000 mcg/mL injection, Inject 1 mL (1,000 mcg) into the muscle 2 times a week., Disp: 10 mL, Rfl: 0    estradiol (Climara) 0.075 mg/24 hr patch, Place 1 patch over 7 days on the skin 1 (one) time per week., Disp: 12 patch, Rfl: 3    estradiol (Climara) 0.1 mg/24 hr patch, Place 1 patch over 7 days on the skin 1 (one) time per week., Disp: 4 patch, Rfl: 12    fexofenadine (Allegra) 180 mg tablet, Take 1 tablet (180 mg) by mouth once daily as needed., Disp: , Rfl:     insulin syringe-needle U-100 30G X 1/2\" 0.3 mL syringe, Use Once monthly for b 12 shot, Disp: 15 each, Rfl: 1    mometasone (Nasonex) 50 mcg/actuation nasal spray, Administer 1 spray into affected nostril(s) once daily., Disp: , Rfl:     progesterone (Prometrium) 100 mg capsule, Take 1 capsule (100 mg) by mouth once daily at bedtime., Disp: 90 capsule, Rfl: 4    tirzepatide, weight loss, (Zepbound) 5 mg/0.5 mL injection, Inject 5 mg under the skin every 7 days., Disp: 4 each, Rfl: 1     Objective   Vitals:    12/27/24 1524   BP: 100/60      Physical Exam  Constitutional:       General: She is not in acute distress.     Appearance: Normal appearance.   HENT:      Head: Normocephalic and atraumatic.      Mouth/Throat:      Mouth: Mucous membranes are moist.   Eyes:      Pupils: Pupils are equal, round, and reactive to light.   Cardiovascular:      Rate and Rhythm: Normal rate and regular rhythm.   Pulmonary:      Effort: Pulmonary effort is normal.   Chest:      Chest wall: No " mass, swelling or tenderness.   Breasts:     Right: Normal. No mass, nipple discharge, skin change or tenderness.      Left: Normal. No mass, nipple discharge, skin change or tenderness.      Comments: Dense parenchyma throughout, no focal mass  Abdominal:      General: Abdomen is flat.      Palpations: Abdomen is soft.   Musculoskeletal:         General: Normal range of motion.      Cervical back: Normal range of motion.   Lymphadenopathy:      Cervical: No cervical adenopathy.      Upper Body:      Right upper body: No axillary adenopathy.      Left upper body: No axillary adenopathy.   Skin:     General: Skin is warm and dry.      Comments: Mild right upper extremity lymphedema   Neurological:      Mental Status: She is alert. Mental status is at baseline.   Psychiatric:         Mood and Affect: Mood normal.           Assessment/Plan   Problem List Items Addressed This Visit    None  Visit Diagnoses         Codes    Abnormal mammogram    -  Primary R92.8    Relevant Orders    MR breast bilateral w contrast full protocol            Imaging discussed.  Stereotactic biopsy pending.  Preprocedure MRI given extensive mammogram changes which would affect potential surgical planning.  Will follow-up in the office for discussion regarding pathology and further recommendations.  Stephanie Norton MD

## 2025-01-02 ENCOUNTER — HOSPITAL ENCOUNTER (OUTPATIENT)
Dept: RADIOLOGY | Facility: HOSPITAL | Age: 45
Discharge: HOME | End: 2025-01-02
Payer: COMMERCIAL

## 2025-01-02 DIAGNOSIS — R92.8 ABNORMAL MAMMOGRAM: ICD-10-CM

## 2025-01-02 PROCEDURE — A9575 INJ GADOTERATE MEGLUMI 0.1ML: HCPCS | Performed by: SURGERY

## 2025-01-02 PROCEDURE — 77049 MRI BREAST C-+ W/CAD BI: CPT

## 2025-01-02 PROCEDURE — 2550000001 HC RX 255 CONTRASTS: Performed by: SURGERY

## 2025-01-02 RX ORDER — GADOTERATE MEGLUMINE 376.9 MG/ML
0.2 INJECTION INTRAVENOUS
Status: COMPLETED | OUTPATIENT
Start: 2025-01-02 | End: 2025-01-02

## 2025-01-02 RX ADMIN — GADOTERATE MEGLUMINE 17 ML: 376.9 INJECTION INTRAVENOUS at 12:26

## 2025-01-08 ENCOUNTER — APPOINTMENT (OUTPATIENT)
Dept: RADIOLOGY | Facility: HOSPITAL | Age: 45
End: 2025-01-08
Payer: COMMERCIAL

## 2025-01-08 ENCOUNTER — APPOINTMENT (OUTPATIENT)
Dept: SURGICAL ONCOLOGY | Facility: HOSPITAL | Age: 45
End: 2025-01-08
Payer: COMMERCIAL

## 2025-01-09 ENCOUNTER — HOSPITAL ENCOUNTER (OUTPATIENT)
Dept: RADIOLOGY | Facility: HOSPITAL | Age: 45
Discharge: HOME | End: 2025-01-09
Payer: COMMERCIAL

## 2025-01-09 VITALS
OXYGEN SATURATION: 99 % | HEART RATE: 78 BPM | RESPIRATION RATE: 16 BRPM | DIASTOLIC BLOOD PRESSURE: 82 MMHG | SYSTOLIC BLOOD PRESSURE: 118 MMHG

## 2025-01-09 DIAGNOSIS — R92.8 ABNORMAL MAMMOGRAM: ICD-10-CM

## 2025-01-09 DIAGNOSIS — R92.8 ABNORMAL MAMMOGRAM: Primary | ICD-10-CM

## 2025-01-09 PROCEDURE — 19081 BX BREAST 1ST LESION STRTCTC: CPT | Mod: RIGHT SIDE | Performed by: RADIOLOGY

## 2025-01-09 PROCEDURE — A4648 IMPLANTABLE TISSUE MARKER: HCPCS

## 2025-01-09 PROCEDURE — 19081 BX BREAST 1ST LESION STRTCTC: CPT | Mod: RT

## 2025-01-09 PROCEDURE — 76098 X-RAY EXAM SURGICAL SPECIMEN: CPT | Mod: RT

## 2025-01-09 PROCEDURE — 2500000004 HC RX 250 GENERAL PHARMACY W/ HCPCS (ALT 636 FOR OP/ED): Performed by: RADIOLOGY

## 2025-01-09 PROCEDURE — 77065 DX MAMMO INCL CAD UNI: CPT | Mod: RIGHT SIDE | Performed by: RADIOLOGY

## 2025-01-09 PROCEDURE — 2780000003 HC OR 278 NO HCPCS

## 2025-01-09 PROCEDURE — 2720000007 HC OR 272 NO HCPCS

## 2025-01-09 RX ORDER — LIDOCAINE HYDROCHLORIDE 20 MG/ML
INJECTION, SOLUTION EPIDURAL; INFILTRATION; INTRACAUDAL; PERINEURAL AS NEEDED
Status: COMPLETED | OUTPATIENT
Start: 2025-01-09 | End: 2025-01-09

## 2025-01-09 RX ADMIN — LIDOCAINE HYDROCHLORIDE 10 ML: 20 INJECTION, SOLUTION EPIDURAL; INFILTRATION; INTRACAUDAL; PERINEURAL at 10:08

## 2025-01-09 ASSESSMENT — PAIN - FUNCTIONAL ASSESSMENT
PAIN_FUNCTIONAL_ASSESSMENT: 0-10
PAIN_FUNCTIONAL_ASSESSMENT: 0-10

## 2025-01-09 ASSESSMENT — PAIN SCALES - GENERAL
PAINLEVEL_OUTOF10: 0 - NO PAIN
PAINLEVEL_OUTOF10: 0 - NO PAIN

## 2025-01-09 NOTE — DISCHARGE INSTRUCTIONS
ACTIVITY  You may return to work or other activities the day of your biopsy, providing that these activities do not include heavy lifting or strenuous athletic sports.  Do not shower or bathe for the next 24 hours.  You may take a sponge bath, providing you take care to keep the biopsy area dry.  You may return to all of your normal activities after 48 hours as tolerated.  MEDICATIONS  Avoid aspirin for the next 24 hours. Use Tylenol if needed for discomfort over the next 48 hours.   Continue taking your normal medications as usual, except as noted below.  DRESSINGS  Keep the gauze dressing and your bra on day and night for the next 24 hours.  Remove the dressing 24 hours after the biopsy. You may bathe or shower at that time. The steri strips may come off when wet. Use a band aid if needed. Steri strips should be on no longer than 5 days.  Use ice pack if desired: 1st 24 hours- on 15 minutes, off 15 minutes.  OTHER INSTRUCTIONS  Watch for excessive bleeding, swelling, redness, pain or fever. If any of these occur, contact [facility] Radiology at [phone] between 8am and 5pm, or the Radiologists answering service at [phone] after 5pm. The Mammography department may be reached at [phone] Monday - Friday 7:30 am - 3:30 pm.  **For several days or even a couple of weeks, you may feel mild tenderness, “twinges”, or a small bump at the biopsy puncture site. This is normal. Applying moist heat to the area after 2 days may bring relief. This will disappear with time.    Dr. Norton [phone]    Radha Sewell Nurse Navigator [phone]

## 2025-01-15 LAB
LAB AP ASR DISCLAIMER: NORMAL
LAB AP BLOCK FOR ADDITIONAL STUDIES: NORMAL
LABORATORY COMMENT REPORT: NORMAL
PATH REPORT.FINAL DX SPEC: NORMAL
PATH REPORT.GROSS SPEC: NORMAL
PATH REPORT.RELEVANT HX SPEC: NORMAL
PATH REPORT.TOTAL CANCER: NORMAL
PATHOLOGY SYNOPTIC REPORT: NORMAL

## 2025-01-17 ENCOUNTER — OFFICE VISIT (OUTPATIENT)
Dept: SURGERY | Facility: HOSPITAL | Age: 45
End: 2025-01-17
Payer: COMMERCIAL

## 2025-01-17 VITALS
BODY MASS INDEX: 28.79 KG/M2 | WEIGHT: 190 LBS | SYSTOLIC BLOOD PRESSURE: 120 MMHG | DIASTOLIC BLOOD PRESSURE: 70 MMHG | HEIGHT: 68 IN

## 2025-01-17 DIAGNOSIS — Z17.0 MALIGNANT NEOPLASM OF OVERLAPPING SITES OF RIGHT BREAST IN FEMALE, ESTROGEN RECEPTOR POSITIVE: Primary | ICD-10-CM

## 2025-01-17 DIAGNOSIS — C50.811 MALIGNANT NEOPLASM OF OVERLAPPING SITES OF RIGHT BREAST IN FEMALE, ESTROGEN RECEPTOR POSITIVE: Primary | ICD-10-CM

## 2025-01-17 DIAGNOSIS — D05.11 DUCTAL CARCINOMA IN SITU (DCIS) OF RIGHT BREAST: Primary | ICD-10-CM

## 2025-01-17 PROCEDURE — 99214 OFFICE O/P EST MOD 30 MIN: CPT | Performed by: SURGERY

## 2025-01-17 PROCEDURE — 3008F BODY MASS INDEX DOCD: CPT | Performed by: SURGERY

## 2025-01-17 ASSESSMENT — ENCOUNTER SYMPTOMS
GASTROINTESTINAL NEGATIVE: 1
PSYCHIATRIC NEGATIVE: 1
EYES NEGATIVE: 1
ALLERGIC/IMMUNOLOGIC NEGATIVE: 1
CONSTITUTIONAL NEGATIVE: 1
NEUROLOGICAL NEGATIVE: 1
RESPIRATORY NEGATIVE: 1
HEMATOLOGIC/LYMPHATIC NEGATIVE: 1
MUSCULOSKELETAL NEGATIVE: 1
CARDIOVASCULAR NEGATIVE: 1
ENDOCRINE NEGATIVE: 1

## 2025-01-17 NOTE — PROGRESS NOTES
Subjective   Patient ID: Tori Taylor is a 44 y.o. female who presents for Follow-up (Biopsy follow up).  HPI  Right diagnostic mammogram 12/10/2024 following category 0 screening showed indeterminate microcalcifications lower outer quadrant spanning from the subareolar region to a posterior depth.  Cross anteriorly to the lower inner quadrant, 10 cm span.  Heterogenous and pleomorphic.  Category 4.    History of right upper extremity melanoma approximately 10 years ago with wide excision and right axillary sentinel node biopsy subsequent axillary node dissection for a positive raji disease.  Adjuvant interferon  3 years ago status post excision of brain metastasis, adjuvant radiation and immunotherapy.  Notes residual mild lymphedema in the right upper extremity, wears a sleeve as needed with excessive activity that can trigger swelling    Breast history:  G3, P2 first age 23, BF x6 months each  No family history  Perimenopausal, menarche age 11  No hormone exposure    Review of Systems   Constitutional: Negative.    HENT: Negative.     Eyes: Negative.    Respiratory: Negative.     Cardiovascular: Negative.    Gastrointestinal: Negative.    Endocrine: Negative.    Genitourinary: Negative.    Musculoskeletal: Negative.    Skin: Negative.    Allergic/Immunologic: Negative.    Neurological: Negative.    Hematological: Negative.    Psychiatric/Behavioral: Negative.           Past Medical History:   Diagnosis Date    Lymphedema, not elsewhere classified 02/10/2020    Lymphedema    Malignant melanoma of right upper limb, including shoulder (Multi) 06/27/2016    Melanoma of right upper arm    Malignant melanoma of unspecified upper limb, including shoulder (Multi) 04/17/2020    Melanoma of upper limb    Nontoxic single thyroid nodule 05/12/2021    Right thyroid nodule    Other abnormal glucose 06/15/2020    Elevated glucose    Personal history of irradiation     History of radiation therapy    Personal history of  malignant melanoma of skin     History of malignant melanoma    Personal history of other diseases of the respiratory system     Personal history of asthma    Personal history of other endocrine, nutritional and metabolic disease     History of hypothyroidism    Personal history of other medical treatment     H/O chest x-ray    Personal history of other specified conditions 10/21/2020    History of weight loss    Secondary malignant neoplasm of brain (Multi) 2020    Malignant melanoma metastatic to brain    Unspecified asthma with (acute) exacerbation ([facility]) 02/10/2020    Asthma with acute exacerbation     Past Surgical History:   Procedure Laterality Date     SECTION, CLASSIC  2014     Section    CHOLECYSTECTOMY  2014    Cholecystectomy    OTHER SURGICAL HISTORY  2020    Brain surgery    OTHER SURGICAL HISTORY  2014    Axillary Lymphadenectomy    OTHER SURGICAL HISTORY  2014    Excision Of Lesion Arms Malignant    OTHER SURGICAL HISTORY  2014    Lymphatic Surgery Intraoperative Identification Of [place] Node    OTHER SURGICAL HISTORY  03/10/2015    Laparoscopic Aspiration Left Ovary Cyst    OTHER SURGICAL HISTORY  2021    Thyroid surgery    OTHER SURGICAL HISTORY  2021    Thyroidectomy    SENTINEL LYMPH NODE BIOPSY  2014    [place] Lymph Node Biopsy     Family History   Problem Relation Name Age of Onset    Deep vein thrombosis Mother  40    Other (benign breast tumor) Mother      Diabetes Father        Social History     Socioeconomic History    Marital status:    Tobacco Use    Smoking status: Former     Types: Cigarettes    Smokeless tobacco: Never   Substance and Sexual Activity    Alcohol use: Yes     Comment: social    Drug use: Never    Sexual activity: Yes     Partners: Male     Birth control/protection: I.U.D.          Current Outpatient Medications:     acetaminophen (Tylenol) 500 mg tablet, Take 1 tablet (500 mg) by  "mouth., Disp: , Rfl:     albuterol 90 mcg/actuation inhaler, Inhale 2 puffs 4 times a day., Disp: 18 g, Rfl: 3    BD Luer-Juliana Syringe 3 mL 25 gauge x 1\" syringe, USE 1 SYRINGE TWICE WEEKLY FOR B12 INJECTIONS, Disp: 8 each, Rfl: 11    cholecalciferol (Vitamin D-3) 5,000 Units tablet, Take 1 tablet (5,000 Units) by mouth once daily., Disp: , Rfl:     copper (ParaGard T 380A) 380 square mm IUD, by intrauterine route., Disp: , Rfl:     cyanocobalamin (Vitamin B-12) 1,000 mcg/mL injection, Inject 1 mL (1,000 mcg) into the muscle 2 times a week., Disp: 10 mL, Rfl: 0    estradiol (Climara) 0.075 mg/24 hr patch, Place 1 patch over 7 days on the skin 1 (one) time per week. (Patient not taking: Reported on 1/17/2025), Disp: 12 patch, Rfl: 3    estradiol (Climara) 0.1 mg/24 hr patch, Place 1 patch over 7 days on the skin 1 (one) time per week. (Patient not taking: Reported on 1/17/2025), Disp: 4 patch, Rfl: 12    fexofenadine (Allegra) 180 mg tablet, Take 1 tablet (180 mg) by mouth once daily as needed., Disp: , Rfl:     insulin syringe-needle U-100 30G X 1/2\" 0.3 mL syringe, Use Once monthly for b 12 shot, Disp: 15 each, Rfl: 1    mometasone (Nasonex) 50 mcg/actuation nasal spray, Administer 1 spray into affected nostril(s) once daily., Disp: , Rfl:     progesterone (Prometrium) 100 mg capsule, Take 1 capsule (100 mg) by mouth once daily at bedtime., Disp: 90 capsule, Rfl: 4    tirzepatide, weight loss, (Zepbound) 5 mg/0.5 mL injection, Inject 5 mg under the skin every 7 days., Disp: 4 each, Rfl: 1     Objective   Vitals:    01/17/25 1425   BP: 120/70      Physical Exam  Constitutional:       General: She is not in acute distress.     Appearance: Normal appearance.   HENT:      Head: Normocephalic and atraumatic.      Mouth/Throat:      Mouth: Mucous membranes are moist.   Eyes:      Pupils: Pupils are equal, round, and reactive to light.   Cardiovascular:      Rate and Rhythm: Normal rate and regular rhythm.   Pulmonary:     "  Effort: Pulmonary effort is normal.   Chest:      Chest wall: No mass, swelling or tenderness.   Breasts:     Right: Normal. No mass, nipple discharge, skin change or tenderness.      Left: Normal. No mass, nipple discharge, skin change or tenderness.      Comments: Dense parenchyma throughout, no focal mass  Abdominal:      General: Abdomen is flat.      Palpations: Abdomen is soft.   Musculoskeletal:         General: Normal range of motion.      Cervical back: Normal range of motion.   Lymphadenopathy:      Cervical: No cervical adenopathy.      Upper Body:      Right upper body: No axillary adenopathy.      Left upper body: No axillary adenopathy.   Skin:     General: Skin is warm and dry.      Comments: Mild right upper extremity lymphedema   Neurological:      Mental Status: She is alert. Mental status is at baseline.   Psychiatric:         Mood and Affect: Mood normal.         Assessment/Plan   Problem List Items Addressed This Visit    None  Referral to Dr. Reyes, genetics. Await pt followup for surgical scheduling.     Stephanie Norton MD    with immediate reconstruction, nipple sparing mastectomy with immediate reconstruction.  Discussed that given her prior axillary dissection, reassuring imaging and exam with regard to the axilla, and potential for exacerbation of her underlying lymphedema with additional axillary surgery, recommend to eliminate axillary evaluation at the time of mastectomy.  Preoperative referral to plastics for ongoing discussion and coordination of surgical intervention.  Indications for surgery discussed as well as risks of bleeding, infection, delayed wound healing, need for additional procedures including margin positivity, anesthesia complications cardiac/respiratory/thrombotic.  Patient wishes to consider these options and will follow-up regarding her decision for surgery.     Genetics referral    Stephanie Norton MD

## 2025-01-22 ENCOUNTER — HOSPITAL ENCOUNTER (OUTPATIENT)
Dept: RADIOLOGY | Facility: HOSPITAL | Age: 45
Discharge: HOME | End: 2025-01-22
Payer: COMMERCIAL

## 2025-01-22 DIAGNOSIS — D05.11 DUCTAL CARCINOMA IN SITU (DCIS) OF RIGHT BREAST: ICD-10-CM

## 2025-01-22 PROCEDURE — 76642 ULTRASOUND BREAST LIMITED: CPT | Mod: RT

## 2025-01-23 ASSESSMENT — ENCOUNTER SYMPTOMS
NAUSEA: 0
SEIZURES: 0
HEADACHES: 0
VOMITING: 0
SPEECH DIFFICULTY: 0
NUMBNESS: 0
FEVER: 0
EXTREMITY WEAKNESS: 0
CHILLS: 0

## 2025-01-24 ENCOUNTER — OFFICE VISIT (OUTPATIENT)
Dept: PLASTIC SURGERY | Facility: CLINIC | Age: 45
End: 2025-01-24
Payer: COMMERCIAL

## 2025-01-24 VITALS
SYSTOLIC BLOOD PRESSURE: 120 MMHG | WEIGHT: 190 LBS | BODY MASS INDEX: 28.79 KG/M2 | HEIGHT: 68 IN | DIASTOLIC BLOOD PRESSURE: 70 MMHG

## 2025-01-24 DIAGNOSIS — D05.11 DUCTAL CARCINOMA IN SITU (DCIS) OF RIGHT BREAST: ICD-10-CM

## 2025-01-24 PROCEDURE — 1036F TOBACCO NON-USER: CPT | Performed by: PLASTIC SURGERY

## 2025-01-24 PROCEDURE — 99204 OFFICE O/P NEW MOD 45 MIN: CPT | Performed by: PLASTIC SURGERY

## 2025-01-24 PROCEDURE — 3008F BODY MASS INDEX DOCD: CPT | Performed by: PLASTIC SURGERY

## 2025-01-24 RX ORDER — NAPROXEN SODIUM 220 MG
TABLET ORAL
COMMUNITY
Start: 2025-01-22

## 2025-01-24 ASSESSMENT — ENCOUNTER SYMPTOMS
UNEXPECTED WEIGHT CHANGE: 0
CHILLS: 0
FEVER: 0

## 2025-01-24 ASSESSMENT — PAIN SCALES - GENERAL: PAINLEVEL_OUTOF10: 0-NO PAIN

## 2025-01-24 NOTE — PROGRESS NOTES
Subjective   Patient ID: Tori Taylor is a 44 y.o. female.    HPI  44-year-old female with right breast cancer referred by Dr. Norton for reconstructive options.  Patient is for planned mastectomy.  Patient has had previous melanoma excision on the right upper extremity with lymph node dissection.  Review of Systems   Constitutional:  Negative for chills, fever and unexpected weight change.       Objective   Physical Exam  Well-developed patient in no acute distress  Examination HEENT within normal limits  Neck supple no observable masses  Lungs clear to auscultation  Heart regular rate and rhythm  Breast reveals on the right side some fullness on the lower pole of the right breast.  Breast measurements  Sternal notch to nipple right is 30 cm left is 28 cm  Nipple to inframammary fold 10 cm on the right 10 cm on the left  Breast diameter: 16 cm on the right 15 cm on the left  Projection: 15 cm on the right 12 cm on the left  Areolar circumference: 8 cm on the right 6 cm on the left  Areolar diameter 5 cm bilaterally  Ptosis 7 cm on the right 6 cm on the left  Abdomen soft nontender  Extremities full range of motion  Neurological exam is grossly within normal limits  Assessment/Plan   Diagnoses and all orders for this visit:  Ductal carcinoma in situ (DCIS) of right breast  -     Referral to Plastic Surgery    Impression: For planned right mastectomy.  Recommendation: We have discussed with pyogenous tissue reconstruction as well as implant reconstruction.  [facility] reconstruction booklet provided to the patient as well as reconstruction brochure.  Patient will think about this and let me know  Questions were answered

## 2025-01-28 NOTE — PROGRESS NOTES
Chief Complaint  New right breast DCIS, ER positive    History of Present Illness    PCP Fab    44 year-old   female here with new right breast DCIS , ER positive   She is here with her aunt.    History:  1) No abnormal mammograms, breast biopsies, or breast surgeries.  2) 12/5/2024.  Bilateral screening mammogram.  Left breast negative.  Right breast calcifications BI-RADS 0  3) 12/10/2024.  Right breast diagnostic imaging.  Extensive indeterminate right breast calcifications are confirmed.  Predominantly centered in the lower outer quadrant from the subareolar region to posterior depth.  They extend anteriorly into the lower inner quadrant.  They span over 10 cm.  BI-RADS 4.  2 site biopsy is recommended  4) met with Dr. JO Norton. Advised pre bx MRI  1-20 25.  Bilateral breast MRI.  Left breast negative.  Right breast extensive area of non-mass enhancement centered in the lower outer quadrant.  Non-mass enhancement extends at the subareolar region, far posterior depth.  Enhancement begins at 9:00 and extends to 6:00.  This spans 11 x 7.1 x 4.3 cm.  They correlate to the extent of microcalcifications on mammogram.  There is no enhancement of the chest wall or pectoralis muscle.  No skin enhancement is appreciated though non-mass enhancement is superficial.  Right axilla is not included in the field-of-view but there is no lymphadenopathy.  BI-RADS 4.  2 site stereotactic biopsy  5) January 9, 2025.  Right breast stereotactic biopsy (done at [place]. Only 1 site) .  Intermediate to high-grade DCIS, ER % clip placed  6) January 22, 2025.  Right axillary ultrasound.  No sonographic findings.  No lymph nodes are seen  7) communicated with Dr. Welch and Dr. Musa who suggest proceeding with treatment for her DCIS  8) genetics appt 2/10/2025      She presents today for further management and surgical discussion.  Stopped HRT at diagnosis.     2014  personal history of right upper extremity melanoma  status post wide excision and sentinel lymph node biopsy and axillary lymph node dissection.  Adjuvant interferon 3 years.    2020  brain metastasis status post excision, radiation and immunotherapy.      Ob/gyn history:  Menarche 11  Menopause perimenopausal  G 3 P 2, age of first delivery 23  + OCPs, no fertility treatments, recent HRT    No family history of breast or ovarian cancer.     Review of systems  A comprehensive ROS was taken on the patient intake form and reviewed with the patient. This form is scanned into the electronic medical record.    Constitutional symptoms: Denies generalized fatigue. Denies weight change, fevers/chills, difficulty sleeping   Eyes: Denies double vision, glaucoma, cataracts.  Ear/nose/throat/mouth: Denies hearing changes, sore throat, sinus problems.  Cardiovascular: No chest pain. Denies irregular heartbeat. Denies ankle swelling.  Respiratory: No wheezing, cough, or shortness of breath.  Gastrointestinal: No abdominal pain, No nausea/vomiting. No indigestion/heartburn. No change in bowel habits. No constipation or diarrhea.   Genitourinary: No urinary incontinence. No urinary frequency. No painful urination.  Musculoskeletal: No bone pain, no muscle pain, no joint pain.   Integumentary: No rash. No masses. No changes in moles. No easy bruising.  Neurological: No headaches. No tremors. No numbness/tingling.  Psychiatric: No anxiety. No depression.  Endocrine: No excessive thirst. Not too hot or too cold. Not tired or fatigued.    Hematological/lymphatic: No swollen glands or blood clotting problems. No bruising.     Physical exam  A chaperone was offered for all portions of the physical exam. The patient declined.     General appearance: appears stated age, alert and oriented x 3  Head: Normocephalic, atraumatic  Eyes: conjunctivae/corneas clear.  Ears: External ears are normal, hearing is grossly intact.  Lungs: normal breathing  Heart: regular   Abdomen: Soft, nontender,  nondistended.  Neurologic: grossly normal  Lymph nodes: No cervical, supraclavicular or axillary lymphadenopathy bilaterally    Breast: A comprehensive breast exam was performed in the seated and supine positions. Breasts are symmetrical. Bilateral nipples are everted. There are no skin changes on arm maneuvers. Bra size: 36C   Right: no masses. Biopsy site inferior breast clean.    Left: no masses    Results  Imaging  All breast imaging personally reviewed by me.  See HPI    Pathology  January 9, 2025.  Right breast stereotactic biopsy (done at [place]. Only 1 site) .  Intermediate to high-grade DCIS, ER % clip placed    Impression:   1) 44 year-old   female here with new right breast DCIS , ER positive   Calcs span over 10 cm  2) Co-morbidities include history of metastatic melanoma. Non-smoker  3) No family history of breast or ovarian cancer. Appt pending      Plan:   I had a long discussion with Ms. Taylor and her aunt  today. We reviewed her imaging and work-up to date and the results.  She has a new  right breast DCIS, ER positive. Calcs span over 10 cm.  We reviewed the multidisciplinary treatment of breast cancer.     MRI extent consistent with extent of calcifications. She is not a candidate breast breast conservation. We discussed mastectomy with and without reconstruction. I explained that mastectomy results in loss of sensation over the breast.  The risks were described as bleeding, infection, and flap necrosis.  The patient was told that at least one drain would be required.  She was told that no reconstruction would result in a flat chest, and that a prosthetic could be used which, when she was in a bra and clothing, could not be distinguished from a breast.  Reconstruction options were discussed briefly. She was informed that the plastic surgeon would make the final decision regarding her appropriateness for either type of surgery, and whether she would be a candidate for immediate  reconstruction.  She is not a candidate for nipple sparing mastectomy    She is clinically node negative.  We discussed management of the axilla.  The rationale behind sentinel node biopsy was explained, including the smaller risk of lymphedema.  Additional risks of surgery, including nerve damage, were discussed.  She has had a prior complete ALND and has lymphedema.  No lymph nodes were seen on right axillary US.  I advised omitting axillary staging to minimize risks of worsening lymphedema.     Genetic counseling appt is pending.     This patient understands the rationale for the recommendation for right mastectomy.  She is interested in breast reconstruction.  She has been referred to a plastic surgeon.  We will follow-up after her consultation.  She should call with any sooner concerns.

## 2025-01-31 ENCOUNTER — OFFICE VISIT (OUTPATIENT)
Dept: SURGICAL ONCOLOGY | Facility: HOSPITAL | Age: 45
End: 2025-01-31
Payer: COMMERCIAL

## 2025-01-31 VITALS
DIASTOLIC BLOOD PRESSURE: 83 MMHG | HEIGHT: 68 IN | WEIGHT: 206.35 LBS | SYSTOLIC BLOOD PRESSURE: 131 MMHG | RESPIRATION RATE: 18 BRPM | TEMPERATURE: 97.2 F | OXYGEN SATURATION: 99 % | HEART RATE: 55 BPM | BODY MASS INDEX: 31.27 KG/M2

## 2025-01-31 DIAGNOSIS — D05.11 DUCTAL CARCINOMA IN SITU (DCIS) OF RIGHT BREAST: Primary | ICD-10-CM

## 2025-01-31 PROCEDURE — 99205 OFFICE O/P NEW HI 60 MIN: CPT | Performed by: SURGERY

## 2025-01-31 PROCEDURE — 3008F BODY MASS INDEX DOCD: CPT | Performed by: SURGERY

## 2025-01-31 PROCEDURE — 99215 OFFICE O/P EST HI 40 MIN: CPT | Performed by: SURGERY

## 2025-01-31 ASSESSMENT — PAIN SCALES - GENERAL: PAINLEVEL_OUTOF10: 0-NO PAIN

## 2025-01-31 NOTE — PATIENT INSTRUCTIONS
You received your breast cancer binder today. Please review and contact our office if you have any questions or concerns. Contact information provided.    Dr. Maguire has referred you to plastic surgery. Dr. Lira's office will contact you to schedule a consultation.

## 2025-02-10 ENCOUNTER — TELEMEDICINE CLINICAL SUPPORT (OUTPATIENT)
Facility: CLINIC | Age: 45
End: 2025-02-10
Payer: COMMERCIAL

## 2025-02-10 DIAGNOSIS — D05.11 DUCTAL CARCINOMA IN SITU (DCIS) OF RIGHT BREAST: ICD-10-CM

## 2025-02-10 DIAGNOSIS — Z85.820 PERSONAL HISTORY OF MALIGNANT MELANOMA OF SKIN: ICD-10-CM

## 2025-02-10 DIAGNOSIS — Z71.83 ENCOUNTER FOR NONPROCREATIVE GENETIC COUNSELING AND TESTING: ICD-10-CM

## 2025-02-10 DIAGNOSIS — Z13.71 ENCOUNTER FOR NONPROCREATIVE GENETIC COUNSELING AND TESTING: ICD-10-CM

## 2025-02-10 NOTE — PROGRESS NOTES
History of Present Illness:  Tori Taylor is a 44 y.o. female with a diagnosis of early-onset breast cancer (DCIS). She  was referred to the Cancer Genetics Clinic at [facility] by Stephanie Norton MD. Ms. Taylor is interested in genetic testing to clarify their personal risk for cancer, as well as the risks to their family members.    Cancer Medical History:  Personal history of cancer? Yes.  Type: Breast (DCIS, right).  Age at diagnosis: 44.  Summary: Diagnosed at age 44 with screen detected right ER+ DCIS. Plan is for mastectomy based on extent of disease, but patient would consider bilateral mastectomies if BRCA positive. Was originally working with Dr. Stephanie Norton, now seeing Dr. Quynh Maguire. Surgery date TBD. Meeting with plastic surgery next week.    History of other cancers: Yes. Melanoma first diagnosed 6/2014 (right forearm), 2019 metastatic disease to brain. S/p treatment, under close observation with ALMA. History of multiple sunburns.    Prior hereditary cancer (germline) genetic testing? No.    Cancer screening history:  Mammograms? Yes.  PAP smear? Yes, per gyn provider.  Colonoscopy? No. Cologuard x1 (10/2024) that was negative.   Upper endoscopy? Yes x1, remote history (15-17 years ago per patient report), no known history of gastric polyps.  Dermatology? 6 month.  Other cancer screening? No.    Reproductive History:  Number of children: 2.  Number of pregnancies: 3.  Age first birth: 23.  Breast feeding? Yes, for ~1 year total.  Menarche (age): 11.  Menopause (age): N/A.  OCP: Yes, for ~10+ years.  HRT: N/A.    Hysterectomy? No.  Oophorectomy? No.    Family history:  A 4-generation pedigree was obtained and was significant for the following:    -Patient, melanoma at age 31, DCIS at age 44;  -Maternal great uncle, skin cancer;  -No other known family history of cancer.    Maternal ancestry is white/Serbian.  Paternal ancestry is Serbian/Native Amerian. There is no known Ashkenazi  Oriental orthodox ancestry or consanguinity.    Genetic counseling:  Tori is a 44 y.o. female with a recent diagnosis of ER+ breast cancer (DCIS), as well as a personal history of melanoma. Given her diagnosis of early-onset breast cancer, she could have a BRCA1 or BRCA2-related hereditary breast and ovarian cancer (HBOC), or hereditary breast cancer due to a different gene mutation, such as in PALB2.  Tori meets current national (NCCN) criteria for testing of high-penetrance breast cancer susceptibility genes, including BRCA1, BRCA2, CDH1, PALB2, PTEN, and TP53.  Testing is medically necessary, as it will help determine if Tori is a candidate for additional breast surgery (bilateral mastectomies instead of unilateral mastectomy), as well as risk-reducing BSO (having the ovaries and fallopian tubes removed to prevent ovarian cancer).    We reviewed genes and chromosomes, inherited forms of breast and ovarian cancer, and the BRCA1 and BRCA2 genes causing HBOC.  We discussed that most cancers are not due to an inherited genetic susceptibility.  However, in about 5-10% of families, there is an inherited genetic mutation that can make a person more susceptible to developing certain forms of cancer.  Within these families, we often see multiple family members with cancer, occurring in multiple generations.  In addition, earlier onset and bilateral cancers are suggestive of an inherited form of cancer.  Finally, there is a clustering of certain types of cancer in these families, such as breast and ovarian cancer..    We discussed the BRCA1 and BRCA2 genes, which are two genes that have been linked to early-onset breast and/or ovarian cancer.  Mutations in these genes are inherited in a dominant pattern and confer up to an 87% lifetime risk for breast cancer.  This is elevated compared to the general population risk of 10-12%.  In addition, BRCA1 and BRCA2 mutation carriers have up to a 45% lifetime risk for ovarian cancer,  which is elevated over the 2% general population risk.  Mutation carriers who have already been diagnosed with cancer have an increased risk to develop a second, contralateral breast cancer.  BRCA2 gene mutation carriers have an increased risk for male breast cancer, prostate cancer, melanoma, gastric cancer, and pancreatic cancer.    We discussed that there are multiple genes associated with increased breast and/or ovarian cancer risk. Some genes, like the BRCA genes are considered highly penetrant breast and ovarian cancer genes, meaning a mutation in the gene confers a high risk of breast and/or ovarian cancer. On the other hand, there are other intermediate (moderate risk) breast and ovarian cancer genes. For many of the moderate risk genes, there is sometimes limited information regarding the degree to which a mutation in the gene affects risk of different types of cancers. Additionally, for some of these moderate risk genes, the appropriate management for individuals who have a mutation in one of these genes is not always clear. In many cases, even if an individual tests positive for a mutation in a moderate risk gene, recommendations are still based on the family history, not the positive test result.    Tori was counseled about hereditary cancer susceptibility including cancer risks, options for increased screening and/or risk reduction, genetic testing, and the implications for other family members.  We discussed performing testing for high-penetrance breast cancer susceptibility genes, ideally as part of a multi-gene panel.  We discussed both the 39-gene [facility] CancerNext panel and the 76-gene [facility] CancerNext-Expanded panel. Tori is most interested in the [facility] CancerNext-Expanded panel, which examines the following 76 genes:  AIP, ALK, APC, PARKER, AXIN2, BAP1, BARD1, BMPR1A, BRCA1, BRCA2, BRIP1, CDC73, CDH1, CDK4, CDKN1B, CDKN2A, CEBPA, CHEK2, CTNNA1, DDX41, DICER1, EGFR, EPCAM, ETV6, FH, FLCN, GATA2,  GREM1, HOXB13, KIT, LZTR1, MAX, MBD4, MEN1, MET, MITF, MLH1, MSH2, MSH3, MSH6, MUTYH, NF1, NF2, NTHL1, PALB2, PDGFRA, PHOX2B, PMS2, POLD1, POLE, POT1, KFGAW3X, PTCH1, PTEN, RAD51C, RAD51D, RB1, RET, RUNX1, SDHA, SDHAF2, SDHB, SDHD, SMAD4, SMARCA4, SMARCB1, SMARCE1, STK11, SUFU, [id number], TP53, TSC1, TSC2, VHL, WT1. To assist in surgical decision making, we also recommended a smaller rush/stat panel (13 gene BRCAPlus panel). Tests will be run concurrently.    After a discussion about the risks, benefits, and limitations of genetic testing, Tori elected to undergo genetic testing for hereditary cancer using the [facility]ry panels described above. Oral consent for testing was obtained. An [employer] DNA/RNA blood test kit will be sent to Tori's home. They will then bring the test kit to a  outpatient blood draw lab to have their blood drawn for testing (using the test tubes provided in the kit). The sample will then be sent to [facility] for analysis.    The BRCAPlus results generally return in 2 weeks or less from the time of the blood draw. The larger CancerNext-Expanded panel results could take up to 3 additional more weeks. We agreed to call out the results to Tori when available. Her results will also be visible to her in her  MyChart. Should a pathogenic or likely pathogenic variant (positive result) be identified, a follow-up visit will be recommended.    Of note, we reviewed that, even if Tori's test results are negative/normal, we would still consider her close female relatives, including her daughter, to be at increased risk for breast cancer. Even if Tori's results are negative/normal, we would still recommend her daughter speak with her doctors about starting breast cancer screening from age 36 (10 years earlier than when Tori was diagnosed).    PLAN:  1. Tori elected to undergo genetic testing for hereditary cancer using the [facility] panels described above. The BRCAPlus results generally return in 2  weeks or less from the time of the blood draw. The larger CancerNext-Expanded panel results could take up to 3 additional more weeks.    2. We agreed to call out the results to Tori when available. Her results will also be visible to her in her  MyChart. Should a pathogenic or likely pathogenic variant (positive result) be identified, a follow-up visit will be recommended.    3. We remain available to Tori at [phone] if any questions arise regarding information discussed at today's visit. Stephanie can be reached directly at [phone].    Stephanie Calloway MS, [facility]  Genetic Counselor  [facility]  [phone]    Total time spent on day of encounter: 63 minutes (40 minutes with patient, 23 minutes on pre/post patient care activities, including documentation).     Virtual or Telephone Consent    A telephone visit (audio only) between the patient (at the originating site) and the provider (at the distant site) was utilized to provide this telehealth service.   Verbal consent was requested and obtained from Tori Taylor on this date, 02/10/25 for a telehealth visit.

## 2025-02-12 ENCOUNTER — APPOINTMENT (OUTPATIENT)
Dept: LAB | Facility: HOSPITAL | Age: 45
End: 2025-02-12
Payer: COMMERCIAL

## 2025-02-12 ENCOUNTER — LAB (OUTPATIENT)
Dept: LAB | Facility: HOSPITAL | Age: 45
End: 2025-02-12
Payer: COMMERCIAL

## 2025-02-12 DIAGNOSIS — C79.31 MALIGNANT NEOPLASM METASTATIC TO BRAIN (MULTI): ICD-10-CM

## 2025-02-12 DIAGNOSIS — C77.9 MALIGNANT MELANOMA METASTATIC TO LYMPH NODE (MULTI): ICD-10-CM

## 2025-02-12 DIAGNOSIS — C43.9 METASTATIC MELANOMA (MULTI): ICD-10-CM

## 2025-02-12 LAB
ALBUMIN SERPL BCP-MCNC: 4.5 G/DL (ref 3.4–5)
ALP SERPL-CCNC: 45 U/L (ref 33–110)
ALT SERPL W P-5'-P-CCNC: 9 U/L (ref 7–45)
ANION GAP SERPL CALC-SCNC: 10 MMOL/L (ref 10–20)
AST SERPL W P-5'-P-CCNC: 12 U/L (ref 9–39)
BASOPHILS # BLD AUTO: 0.08 X10*3/UL (ref 0–0.1)
BASOPHILS NFR BLD AUTO: 1 %
BILIRUB SERPL-MCNC: 0.4 MG/DL (ref 0–1.2)
BUN SERPL-MCNC: 10 MG/DL (ref 6–23)
CALCIUM SERPL-MCNC: 9.6 MG/DL (ref 8.6–10.3)
CHLORIDE SERPL-SCNC: 103 MMOL/L (ref 98–107)
CO2 SERPL-SCNC: 30 MMOL/L (ref 21–32)
CREAT SERPL-MCNC: 0.64 MG/DL (ref 0.5–1.05)
EGFRCR SERPLBLD CKD-EPI 2021: >90 ML/MIN/1.73M*2
EOSINOPHIL # BLD AUTO: 0.38 X10*3/UL (ref 0–0.7)
EOSINOPHIL NFR BLD AUTO: 5 %
ERYTHROCYTE [DISTWIDTH] IN BLOOD BY AUTOMATED COUNT: 13.1 % (ref 11.5–14.5)
GLUCOSE SERPL-MCNC: 77 MG/DL (ref 74–99)
HCT VFR BLD AUTO: 41.9 % (ref 36–46)
HGB BLD-MCNC: 13.9 G/DL (ref 12–16)
IMM GRANULOCYTES # BLD AUTO: 0.04 X10*3/UL (ref 0–0.7)
IMM GRANULOCYTES NFR BLD AUTO: 0.5 % (ref 0–0.9)
LDH SERPL L TO P-CCNC: 107 U/L (ref 84–246)
LYMPHOCYTES # BLD AUTO: 2.41 X10*3/UL (ref 1.2–4.8)
LYMPHOCYTES NFR BLD AUTO: 31.5 %
MCH RBC QN AUTO: 32.5 PG (ref 26–34)
MCHC RBC AUTO-ENTMCNC: 33.2 G/DL (ref 32–36)
MCV RBC AUTO: 98 FL (ref 80–100)
MONOCYTES # BLD AUTO: 0.65 X10*3/UL (ref 0.1–1)
MONOCYTES NFR BLD AUTO: 8.5 %
NEUTROPHILS # BLD AUTO: 4.08 X10*3/UL (ref 1.2–7.7)
NEUTROPHILS NFR BLD AUTO: 53.5 %
NRBC BLD-RTO: 0 /100 WBCS (ref 0–0)
PLATELET # BLD AUTO: 241 X10*3/UL (ref 150–450)
POTASSIUM SERPL-SCNC: 4.3 MMOL/L (ref 3.5–5.3)
PROT SERPL-MCNC: 6.9 G/DL (ref 6.4–8.2)
RBC # BLD AUTO: 4.28 X10*6/UL (ref 4–5.2)
SODIUM SERPL-SCNC: 139 MMOL/L (ref 136–145)
WBC # BLD AUTO: 7.6 X10*3/UL (ref 4.4–11.3)

## 2025-02-12 PROCEDURE — 80053 COMPREHEN METABOLIC PANEL: CPT

## 2025-02-12 PROCEDURE — 83615 LACTATE (LD) (LDH) ENZYME: CPT

## 2025-02-12 PROCEDURE — 85025 COMPLETE CBC W/AUTO DIFF WBC: CPT

## 2025-02-16 NOTE — PROGRESS NOTES
Plastic Surgery Clinic Visit  Breast Reconstruction    Patient Name: Tori Taylor  MRN: [id number]  Date:  2/18/25     Subjective  Tori Taylor is a 44 y.o. female diagnosed with new right breast DCIS , ER positive . She has a history of right upper arm melanoma with a brain met that was treated. Currently ALMA.  Her melanoma doctors said to go ahead and treat her breast cancer.  She's had a complete ALND and no nodes on imaging.  She has some lymphedema. She was offered immediate implant reconstruction by Dr.Reyes. She is referred by Dr. Maguire for breast reconstruction options.    She lives alone, but her daughter will be coming from college and she has family that lives down the road. She works from home and is not required to travel frequently.     Last Mammogram: 12/10/24    Past Medical History:   Diagnosis Date    Allergic     Anemia     Breast cancer (Multi) 1/2025    Disease of thyroid gland     Lymphedema, not elsewhere classified 02/10/2020    Lymphedema    Malignant melanoma of right upper limb, including shoulder (Multi) 06/27/2016    Melanoma of right upper arm    Malignant melanoma of unspecified upper limb, including shoulder (Multi) 04/17/2020    Melanoma of upper limb    Nontoxic single thyroid nodule 05/12/2021    Right thyroid nodule    Other abnormal glucose 06/15/2020    Elevated glucose    Personal history of irradiation     History of radiation therapy    Personal history of malignant melanoma of skin     History of malignant melanoma    Personal history of other diseases of the respiratory system     Personal history of asthma    Personal history of other endocrine, nutritional and metabolic disease     History of hypothyroidism    Personal history of other medical treatment     H/O chest x-ray    Personal history of other specified conditions 10/21/2020    History of weight loss    Secondary malignant neoplasm of brain (Multi) 09/18/2020    Malignant melanoma metastatic to brain     "Seizures (Multi) ?? Being treated for since     Skin cancer     Unspecified asthma with (acute) exacerbation ([facility]) 02/10/2020    Asthma with acute exacerbation     Past Surgical History:   Procedure Laterality Date    BRAIN SURGERY  2000    BREAST BIOPSY  2025     SECTION, CLASSIC  2014     Section     SECTION, LOW TRANSVERSE  2004    CHOLECYSTECTOMY  2014    Cholecystectomy    LYMPH NODE BIOPSY      OTHER SURGICAL HISTORY  2020    Brain surgery    OTHER SURGICAL HISTORY  2014    Axillary Lymphadenectomy    OTHER SURGICAL HISTORY  2014    Excision Of Lesion Arms Malignant    OTHER SURGICAL HISTORY  2014    Lymphatic Surgery Intraoperative Identification Of [place] Node    OTHER SURGICAL HISTORY  03/10/2015    Laparoscopic Aspiration Left Ovary Cyst    OTHER SURGICAL HISTORY  2021    Thyroid surgery    OTHER SURGICAL HISTORY  2021    Thyroidectomy    SENTINEL LYMPH NODE BIOPSY  2014    [place] Lymph Node Biopsy    SKIN CANCER EXCISION      SKIN GRAFT      WISDOM TOOTH EXTRACTION       Allergies   Allergen Reactions    Adhesive Tape-Silicones Hives    Levalbuterol Hcl Anaphylaxis    Codeine Hives, Other and Unknown    Sulfa (Sulfonamide Antibiotics) Other, Unknown and Nausea/vomiting       Current Outpatient Medications:     acetaminophen (Tylenol) 500 mg tablet, Take 1 tablet (500 mg) by mouth., Disp: , Rfl:     albuterol 90 mcg/actuation inhaler, Inhale 2 puffs 4 times a day., Disp: 18 g, Rfl: 3    BD Luer-Juliana Syringe 3 mL 25 gauge x 1\" syringe, USE 1 SYRINGE TWICE WEEKLY FOR B12 INJECTIONS, Disp: 8 each, Rfl: 11    cholecalciferol (Vitamin D-3) 5,000 Units tablet, Take 1 tablet (5,000 Units) by mouth once daily., Disp: , Rfl:     copper (ParaGard T 380A) 380 square mm IUD, by intrauterine route., Disp: , Rfl:     cyanocobalamin (Vitamin B-12) 1,000 mcg/mL injection, Inject 1 mL (1,000 mcg) into the muscle 2 times a " "week., Disp: 10 mL, Rfl: 0    fexofenadine (Allegra) 180 mg tablet, Take 1 tablet (180 mg) by mouth once daily as needed., Disp: , Rfl:     insulin syringe-needle U-100 1/2 mL 30 gauge syringe, , Disp: , Rfl:     insulin syringe-needle U-100 30G X 1/2\" 0.3 mL syringe, Use Once monthly for b 12 shot, Disp: 15 each, Rfl: 1    mometasone (Nasonex) 50 mcg/actuation nasal spray, Administer 1 spray into affected nostril(s) once daily., Disp: , Rfl:    Family History   Problem Relation Name Age of Onset    Deep vein thrombosis Mother Kendra Lane 40    Other (benign breast tumor) Mother Kendra Lane     Blood clot Mother Kendra Lane     Diabetes Father Obed Azevedo     Hypertension Maternal Grandmother Willow Rizzo ()     Asthma Son Ryland      Social History     Tobacco Use    Smoking status: Former     Current packs/day: 0.00     Types: Cigarettes    Smokeless tobacco: Never    Tobacco comments:     Quit in    Substance Use Topics    Alcohol use: Yes     Comment: Social    Drug use: Never       ROS:  ROS: All 10 systems were reviewed and are unremarkable except for those mentioned in HPI.    Objective    /84 (BP Location: Right arm, Patient Position: Sitting, BP Cuff Size: Large adult)   Pulse 61   Temp 36.7 °C (98.1 °F) (Temporal)   Wt 95.4 kg (210 lb 3.3 oz)   SpO2 98%   BMI 31.97 kg/m²      Physical Exam:   Constitutional: A&Ox3, calm and cooperative, NAD  Eyes: PERRL, clear sclera   ENMT: Moist mucous membranes, no apparent injuries or lesions  Head/Neck: Neck supple, no JVD  Cardiovascular: Normal rate and regular rhythm, 2+ equal pulses of the distal extremities  Respiratory/Thorax: CTAB, regular respirations on RA, good symmetric chest expansion  Gastrointestinal: Abdomen soft, non tender.  Moderate excess lower abdominal skin and subcutaneous fat  Extremities: No peripheral edema  Neurological: A&Ox3  Psychological: Appropriate mood and behavior  Skin: Warm and dry with no " lesions or rashes    Breast Exam:  Left Breast:  NTN: 29  BW: 17    Comments: grade 2, nipple is lower on L by 3.5cm compared to right.     Right Breast:  NTN: 31  BW: 16    Comments: Grade 3 ptosis, right fold is larger by 3cm than the left. R is larger by approximately 50g.   Mass on her nipple on the 4'O clock.       Photos  Completed at this visit      Diagnostics   BI US breast limited right    Result Date: 1/22/2025  Interpreted By:  Rola Henriquez, STUDY: BI US BREAST LIMITED RIGHT; 1/22/2025 3:35 pm   INDICATION: Signs/Symptoms:right axilla scan for surgery planning. 44-year-old female recently diagnosed with DCIS on stereotactic biopsy of right breast calcifications. Personal history of right arm malignant melanoma with a right axillary lymph node metastasis status post axillary dissection in July 2024.   COMPARISON: Prior screening mammograms 06/05/2023, 12/05/2024. Right diagnostic mammogram 12/10/2024. Breast MRI 01/02/2025. Stereotactic biopsy and postprocedure mammogram 01/09/2025.   ACCESSION NUMBER(S): [id number]   ORDERING CLINICIAN: COLE LOO   FINDINGS: Multiple grayscale ultrasonographic images were obtained through the right axilla.   No lymphadenopathy, mass or other focal sonographic abnormality is seen in the right axilla. No right axillary lymph node is seen, in keeping with history of right axillary lymph node dissection.       1. No sonographic evidence of malignancy or lymphadenopathy in the right axilla. Recommend continued follow-up with Breast Surgical Oncology for appropriate management of known extensive right breast malignancy.   BI-RADS CATEGORY:   BI-RADS Category:  2 Benign. Recommendation:  Breast Surgery follow-up for treatment of known right breast cancer. Recommended Date:  Immediate. Laterality:  Right.   Signed by: Rola Henriquez 1/22/2025 4:01 PM Dictation workstation:   [id number]      Assessment/Plan  Tori Taylor is a 44 y.o. female diagnosed with new right  breast DCIS , ER positive . She has a history of right upper arm melanoma with a brain met that was treated. Currently ALMA.  Her melanoma doctors said to go ahead and treat her breast cancer.  She's had a complete ALND and no nodes on imaging.  She has some lymphedema. She was offered immediate implant reconstruction by Dr.Reyes. She is referred by Dr. Maguire for breast reconstruction options. She would like to remain closer to the size she is, possibly a C cup but is not opposed to the idea of being slightly smaller. She would prefer minimal number of surgeries. Discussed options including TE with direct implants vs autologous tissue for reconstruction. With implants, she may need additional surgery in the future for some sort of maintenance, but are relatively simpler in terms of surgery. For now, I recommend proceeding with Carlos so she can heal in a timely manner and return to work. Once we figure out her treatment plan, will discuss reconstruction options then. She will be seeing her oncologist this Thursday and find out what her treatment plan entails, will reach out to my office then.     Will complete photos today.       Scribe Attestation  By signing my name below, Aaron LEROY Scribe, attest that this documentation has been prepared under the direction and in the presence of Barbara Lira MD. Verbal consent obtained from the patient.      Attending Attestation:  Barbara LEROY MD, personal performed the history, exam, and decision making on this patient.  A total of 60 mins were spent in patient counseling, review of medical records, and coordination of care.

## 2025-02-17 ENCOUNTER — HOSPITAL ENCOUNTER (OUTPATIENT)
Dept: RADIOLOGY | Facility: CLINIC | Age: 45
Discharge: HOME | End: 2025-02-17
Payer: COMMERCIAL

## 2025-02-17 DIAGNOSIS — C43.9 METASTATIC MELANOMA (MULTI): ICD-10-CM

## 2025-02-17 DIAGNOSIS — C79.31 MALIGNANT NEOPLASM METASTATIC TO BRAIN (MULTI): ICD-10-CM

## 2025-02-17 DIAGNOSIS — C77.9 MALIGNANT MELANOMA METASTATIC TO LYMPH NODE (MULTI): ICD-10-CM

## 2025-02-17 PROCEDURE — 74177 CT ABD & PELVIS W/CONTRAST: CPT | Performed by: RADIOLOGY

## 2025-02-17 PROCEDURE — 74177 CT ABD & PELVIS W/CONTRAST: CPT

## 2025-02-17 PROCEDURE — 2550000001 HC RX 255 CONTRASTS: Performed by: NURSE PRACTITIONER

## 2025-02-17 PROCEDURE — 71260 CT THORAX DX C+: CPT | Performed by: RADIOLOGY

## 2025-02-17 RX ADMIN — IOHEXOL 75 ML: 350 INJECTION, SOLUTION INTRAVENOUS at 09:17

## 2025-02-18 ENCOUNTER — PATIENT MESSAGE (OUTPATIENT)
Dept: HEMATOLOGY/ONCOLOGY | Facility: CLINIC | Age: 45
End: 2025-02-18
Payer: COMMERCIAL

## 2025-02-18 ENCOUNTER — OFFICE VISIT (OUTPATIENT)
Dept: PLASTIC SURGERY | Facility: CLINIC | Age: 45
End: 2025-02-18
Payer: COMMERCIAL

## 2025-02-18 VITALS
WEIGHT: 210.21 LBS | TEMPERATURE: 98.1 F | DIASTOLIC BLOOD PRESSURE: 84 MMHG | SYSTOLIC BLOOD PRESSURE: 117 MMHG | BODY MASS INDEX: 31.97 KG/M2 | OXYGEN SATURATION: 98 % | HEART RATE: 61 BPM

## 2025-02-18 DIAGNOSIS — D05.11 DUCTAL CARCINOMA IN SITU (DCIS) OF RIGHT BREAST: ICD-10-CM

## 2025-02-18 PROCEDURE — 99205 OFFICE O/P NEW HI 60 MIN: CPT | Performed by: SURGERY

## 2025-02-18 PROCEDURE — 99215 OFFICE O/P EST HI 40 MIN: CPT | Performed by: SURGERY

## 2025-02-18 ASSESSMENT — PATIENT HEALTH QUESTIONNAIRE - PHQ9
2. FEELING DOWN, DEPRESSED OR HOPELESS: NOT AT ALL
1. LITTLE INTEREST OR PLEASURE IN DOING THINGS: NOT AT ALL
SUM OF ALL RESPONSES TO PHQ9 QUESTIONS 1 & 2: 0

## 2025-02-18 ASSESSMENT — PAIN SCALES - GENERAL: PAINLEVEL_OUTOF10: 0-NO PAIN

## 2025-02-19 ENCOUNTER — TELEPHONE (OUTPATIENT)
Dept: GENETICS | Facility: CLINIC | Age: 45
End: 2025-02-19
Payer: COMMERCIAL

## 2025-02-19 DIAGNOSIS — Z85.820 PERSONAL HISTORY OF MALIGNANT MELANOMA OF SKIN: ICD-10-CM

## 2025-02-19 DIAGNOSIS — D05.11 DUCTAL CARCINOMA IN SITU (DCIS) OF RIGHT BREAST: ICD-10-CM

## 2025-02-19 NOTE — TELEPHONE ENCOUNTER
Called patient regarding the genetic testing ordered by Stephanie Calloway [facility]. Explained that the lab, [facility], had sent her the wrong kit and that another sample was needed to do the RNA portion of the testing. Also confirmed that this would not delay the results of her BRCAPlus panel. The patient confirmed that she wanted to submit another sample, and that we could mail another sample kit to her address.

## 2025-02-20 ENCOUNTER — APPOINTMENT (OUTPATIENT)
Dept: RADIOLOGY | Facility: CLINIC | Age: 45
End: 2025-02-20
Payer: COMMERCIAL

## 2025-02-20 ENCOUNTER — TELEMEDICINE (OUTPATIENT)
Dept: HEMATOLOGY/ONCOLOGY | Facility: CLINIC | Age: 45
End: 2025-02-20
Payer: COMMERCIAL

## 2025-02-20 DIAGNOSIS — C79.31 MALIGNANT NEOPLASM METASTATIC TO BRAIN (MULTI): ICD-10-CM

## 2025-02-20 DIAGNOSIS — C77.9 MALIGNANT MELANOMA METASTATIC TO LYMPH NODE (MULTI): ICD-10-CM

## 2025-02-20 DIAGNOSIS — C43.9 METASTATIC MELANOMA (MULTI): ICD-10-CM

## 2025-02-21 PROBLEM — C77.9 SECONDARY AND UNSPECIFIED MALIGNANT NEOPLASM OF LYMPH NODE, UNSPECIFIED: Status: RESOLVED | Noted: 2023-05-18 | Resolved: 2025-02-21

## 2025-02-21 PROBLEM — D49.6 BRAIN TUMOR (MULTI): Status: RESOLVED | Noted: 2023-05-18 | Resolved: 2025-02-21

## 2025-02-21 PROBLEM — C79.31 MALIGNANT NEOPLASM METASTATIC TO BRAIN (MULTI): Status: RESOLVED | Noted: 2022-06-20 | Resolved: 2025-02-21

## 2025-02-21 PROBLEM — D48.5 NEOPLASM OF UNCERTAIN BEHAVIOR OF SKIN: Status: RESOLVED | Noted: 2020-08-27 | Resolved: 2025-02-21

## 2025-02-21 PROBLEM — C43.9 METASTATIC MELANOMA (MULTI): Status: RESOLVED | Noted: 2023-11-06 | Resolved: 2025-02-21

## 2025-02-21 ASSESSMENT — ENCOUNTER SYMPTOMS
ABDOMINAL PAIN: 0
SEIZURES: 0
EYE PROBLEMS: 0
COUGH: 0
HEMOPTYSIS: 0
SCLERAL ICTERUS: 0
EXTREMITY WEAKNESS: 0
CHEST TIGHTNESS: 0
NERVOUS/ANXIOUS: 1
FREQUENCY: 0
DIARRHEA: 0
FATIGUE: 0
TROUBLE SWALLOWING: 0
MYALGIAS: 0
DIZZINESS: 0
CHILLS: 0
APPETITE CHANGE: 0
FEVER: 0
DEPRESSION: 0
BACK PAIN: 0
CONSTIPATION: 0
SHORTNESS OF BREATH: 0
NAUSEA: 0
FLANK PAIN: 0
CONFUSION: 0
HOT FLASHES: 0
DYSURIA: 0
ADENOPATHY: 0
VOMITING: 0
LEG SWELLING: 0
DIFFICULTY URINATING: 0
SORE THROAT: 0
PALPITATIONS: 0
HEMATURIA: 0

## 2025-02-21 NOTE — PROGRESS NOTES
Patient ID: Tori Taylor is a 44 y.o. female.  Referring Physician: Estephania Anton, APRN-CNP  [id number] [place] [address]  Primary Care Provider: Silvia Sanon MD     Chief Complaint   Patient presents with    Follow-up     Review CT scan results    Skin Cancer     Metastatic melanoma with brain metastasis      Oncology History   Malignant melanoma of arm (Multi)   8/25/2014 Initial Diagnosis    Ms. Tori Taylor was first diagnosed with skin melanoma of the right dorsal forearm back in 2014.  At that time he underwent wide local excision and sentinel lymph node biopsy in March 2014.  The final pathology was pT2a N1a M0 melanoma- AJCC 8th Ed. - Stage IIIA.  She had a complete lymph node dissection in which 42 lymph nodes were removed from the right axilla-no additional lymph nodes were found positive. She received 9-months on adjuvant interferon. Unfortunately, she had a relapse in 2020 in the brain as solitary metastasis. She underwent resection and radiation in 06/2020 followed by 2 year of adjuvant pemrolizumab.     6/26/2020 Cancer Staged    Staging form: Melanoma of the Skin, AJCC 8th Edition, Clinical stage from 6/26/2020: Stage IV (rcT1a, cN1a, cM1d(0)) - Signed by Wilmer Welch MD on 2/21/2025 6/16/2024 Cancer Staged    Staging form: Melanoma of the Skin, AJCC 8th Edition, Pathologic stage from 6/16/2024: Stage IIIA (pT1a, pN1a, cM0) - Signed by Wilmer Welch MD on 2/21/2025        Ms. Tori Taylor was diagnosed with stage 3 melanoma in 2014, treated by Dr. Mariano Brown with adjuvant Interferon therapy. 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 Detailed history as below (recorded by Dr. Steve Vega).    - History of stage III melanoma diagnosed in 2014 s/p local excision of right forearm with partial thickness flap and axillary LND s/p 10 months of adjuvant interferon alpha  therapy, stopped due to thyroiditis.  - Solitary brain metastasis left parietal lobe, status post left-sided craniotomy for hemorrhagic tumor resection and duraplasty with pericranial graft by Dr. Ruth Veliz on  6/26/2020.  - Completed adjuvant SBRT therapy post-resection site by Dr. Leticia Mendoza--dates 7/27-31/2020  - Pending PET/CT for 4 mm solitary RUL pulmonary nodule, sub cm liver lesion, and enlarged left adrenal gland.  - 8/7/2020:  Discussion for immunotherapy vs. 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  - 8/21/2020: Discussed results of 8/13/2020 PET/CT, discuss immunotherapy options after tumor board meeting 8/17/2020.  Consent for Pembrolizumab.  - Patient received 5 Pembrolizumab. infusions since September 2020.  Hospitalized for confusion episode concerning for focal seizure.  MRI more suggesting of immunotherapy flare rather than progression of disease (missed last 2 doses due to symptoms).  - Plan to continue Keppra and resumed Pembrolizumab 2/1/2021  - Restaging scans 5/2022 with ALMA in brain or PET CT C/A/P  - Completed 2 years of Pembrolizumab, restaging  - Followed with serial imaging.  - 7/2023: CT C/A/P with an enlarging 1 cm liver lesion, uncertain etiology.  Stable pulmonary nodule.  Cervical cyst for which she will see gynecology.  No changes in MRI brain.  - 7/29/23: MRI Liver does not show any pathologic lesion.      She established care with our medical oncology team (Dr. Wilmer Welch) on 8/10/23 and has been under imaging surveillance since then.     Treatment History:   3/7/2014: Cancer Related Surgery: OPERATION/PROCEDURE:    Wide excision malignant melanoma of right arm, 10 x 5 cm skin excision.  Full-thickness skin graft to right arm skin and soft tissue defect, 6 x 3 cm.  Right axillary sentinel lymph node biopsy.    09/02/2014- commenced adjuvant high dose IFN alpha 2B     14/9/2020: Pembrolizumab 200mg IV every 3 weeks (5 doses, then missed 2 doses for concern of new  "findings on MRI)--Resumed #6 2/1/2021. Last dose 09/2022.      Subjective   Please refer to \"Notes/Cancer History\" above for complete History of present illness.     I had a phone visit with Ms. Tori Taylor today. I spoke directly with the patient. Verbal consent was obtained to conduct this phone visit.      She has done well in the interim period.  He is quite concerned about the CT scan report which was reportedly the lymphadenopathy on the right shoulder to have worsened.  He wants to review the CT scan report.     Review of Systems:   Review of Systems   Constitutional:  Negative for appetite change, chills, fatigue and fever.   HENT:   Negative for hearing loss, lump/mass, mouth sores, sore throat and trouble swallowing.    Eyes:  Negative for eye problems and icterus.   Respiratory:  Negative for chest tightness, cough, hemoptysis and shortness of breath.    Cardiovascular:  Negative for chest pain, leg swelling and palpitations.   Gastrointestinal:  Negative for abdominal pain, constipation, diarrhea, nausea and vomiting.   Endocrine: Negative for hot flashes.   Genitourinary:  Negative for difficulty urinating, dysuria, frequency and hematuria.    Musculoskeletal:  Negative for back pain, flank pain, gait problem and myalgias.   Skin:  Negative for itching and rash.   Neurological:  Negative for dizziness, extremity weakness, gait problem and seizures.   Hematological:  Negative for adenopathy.   Psychiatric/Behavioral:  Negative for confusion and depression. The patient is nervous/anxious.          MEDICAL HISTORY  Past Medical History:   Diagnosis Date    Allergic     Anemia     Breast cancer (Multi) 1/2025    Disease of thyroid gland     Lymphedema, not elsewhere classified 02/10/2020    Lymphedema    Malignant melanoma of right upper limb, including shoulder (Multi) 06/27/2016    Melanoma of right upper arm    Malignant melanoma of unspecified upper limb, including shoulder (Multi) 04/17/2020    " "Melanoma of upper limb    Nontoxic single thyroid nodule 2021    Right thyroid nodule    Other abnormal glucose 06/15/2020    Elevated glucose    Personal history of irradiation     History of radiation therapy    Personal history of malignant melanoma of skin     History of malignant melanoma    Personal history of other diseases of the respiratory system     Personal history of asthma    Personal history of other endocrine, nutritional and metabolic disease     History of hypothyroidism    Personal history of other medical treatment     H/O chest x-ray    Personal history of other specified conditions 10/21/2020    History of weight loss    Secondary malignant neoplasm of brain (Multi) 2020    Malignant melanoma metastatic to brain    Seizures (Multi) ?? Being treated for since     Skin cancer     Unspecified asthma with (acute) exacerbation ([facility]) 02/10/2020    Asthma with acute exacerbation       FAMILY HISTORY  Family History   Problem Relation Name Age of Onset    Deep vein thrombosis Mother Kendra Lane 40    Other (benign breast tumor) Mother Kendra Lane     Blood clot Mother Kendra Lane     Diabetes Father Obed Azevedo     Hypertension Maternal Grandmother Willow Rizzo ()     Asthma Son Ryland        TOBACCO HISTORY  Tobacco Use: Medium Risk (2025)    Patient History     Smoking Tobacco Use: Former     Smokeless Tobacco Use: Never     Passive Exposure: Not on file       SOCIAL HISTORY  Social Connections: Not on file        Outpatient Medication Profile:  Current Outpatient Medications on File Prior to Visit   Medication Sig Dispense Refill    acetaminophen (Tylenol) 500 mg tablet Take 1 tablet (500 mg) by mouth.      albuterol 90 mcg/actuation inhaler Inhale 2 puffs 4 times a day. 18 g 3    BD Luer-Juliana Syringe 3 mL 25 gauge x 1\" syringe USE 1 SYRINGE TWICE WEEKLY FOR B12 INJECTIONS 8 each 11    cholecalciferol (Vitamin D-3) 5,000 Units tablet Take 1 tablet (5,000 " "Units) by mouth once daily.      copper (ParaGard T 380A) 380 square mm IUD by intrauterine route.      cyanocobalamin (Vitamin B-12) 1,000 mcg/mL injection Inject 1 mL (1,000 mcg) into the muscle 2 times a week. 10 mL 0    fexofenadine (Allegra) 180 mg tablet Take 1 tablet (180 mg) by mouth once daily as needed.      insulin syringe-needle U-100 1/2 mL 30 gauge syringe       insulin syringe-needle U-100 30G X 1/2\" 0.3 mL syringe Use Once monthly for b 12 shot 15 each 1    mometasone (Nasonex) 50 mcg/actuation nasal spray Administer 1 spray into affected nostril(s) once daily.       No current facility-administered medications on file prior to visit.         Performance Status:  Asymptomatic     Vitals and Measurements:   There were no vitals taken for this visit.      Physical Exam:   Physical Exam         Lab Results:  I have reviewed these laboratory results:     Lab on 02/12/2025   Component Date Value Ref Range Status    WBC 02/12/2025 7.6  4.4 - 11.3 x10*3/uL Final    nRBC 02/12/2025 0.0  0.0 - 0.0 /100 WBCs Final    RBC 02/12/2025 4.28  4.00 - 5.20 x10*6/uL Final    Hemoglobin 02/12/2025 13.9  12.0 - 16.0 g/dL Final    Hematocrit 02/12/2025 41.9  36.0 - 46.0 % Final    MCV 02/12/2025 98  80 - 100 fL Final    MCH 02/12/2025 32.5  26.0 - 34.0 pg Final    MCHC 02/12/2025 33.2  32.0 - 36.0 g/dL Final    RDW 02/12/2025 13.1  11.5 - 14.5 % Final    Platelets 02/12/2025 241  150 - 450 x10*3/uL Final    Neutrophils % 02/12/2025 53.5  40.0 - 80.0 % Final    Immature Granulocytes %, Automated 02/12/2025 0.5  0.0 - 0.9 % Final    Lymphocytes % 02/12/2025 31.5  13.0 - 44.0 % Final    Monocytes % 02/12/2025 8.5  2.0 - 10.0 % Final    Eosinophils % 02/12/2025 5.0  0.0 - 6.0 % Final    Basophils % 02/12/2025 1.0  0.0 - 2.0 % Final    Neutrophils Absolute 02/12/2025 4.08  1.20 - 7.70 x10*3/uL Final    Immature Granulocytes Absolute, Au* 02/12/2025 0.04  0.00 - 0.70 x10*3/uL Final    Lymphocytes Absolute 02/12/2025 2.41  1.20 " - 4.80 x10*3/uL Final    Monocytes Absolute 02/12/2025 0.65  0.10 - 1.00 x10*3/uL Final    Eosinophils Absolute 02/12/2025 0.38  0.00 - 0.70 x10*3/uL Final    Basophils Absolute 02/12/2025 0.08  0.00 - 0.10 x10*3/uL Final    Glucose 02/12/2025 77  74 - 99 mg/dL Final    Sodium 02/12/2025 139  136 - 145 mmol/L Final    Potassium 02/12/2025 4.3  3.5 - 5.3 mmol/L Final    Chloride 02/12/2025 103  98 - 107 mmol/L Final    Bicarbonate 02/12/2025 30  21 - 32 mmol/L Final    Anion Gap 02/12/2025 10  10 - 20 mmol/L Final    Urea Nitrogen 02/12/2025 10  6 - 23 mg/dL Final    Creatinine 02/12/2025 0.64  0.50 - 1.05 mg/dL Final    eGFR 02/12/2025 >90  >60 mL/min/1.73m*2 Final    Calcium 02/12/2025 9.6  8.6 - 10.3 mg/dL Final    Albumin 02/12/2025 4.5  3.4 - 5.0 g/dL Final    Alkaline Phosphatase 02/12/2025 45  33 - 110 U/L Final    Total Protein 02/12/2025 6.9  6.4 - 8.2 g/dL Final    AST 02/12/2025 12  9 - 39 U/L Final    Bilirubin, Total 02/12/2025 0.4  0.0 - 1.2 mg/dL Final    ALT 02/12/2025 9  7 - 45 U/L Final    LDH 02/12/2025 107  84 - 246 U/L Final         Radiology Result:  I have reviewed the latest Imaging in PACS and the findings are noted in this note. I discussed the results of the latest imaging with the patient. All previous imaging were reviewed at the time it was completed. Full records are available in the EMR for review as well.     CT chest abdomen pelvis w IV contrast    Result Date: 2/17/2025  Impression: 1. Postsurgical changes with right axillary lymph node dissection with a single presumably lymph node within the right axilla superiorly with this area incompletely visualized on prior imaging, but is intervally increased from prior CT measuring 15 x 8 mm with findings worrisome.   2. Otherwise, stable chest, abdomen, and pelvis.         MACRO: None   Signed by: Angela Wilburn 2/17/2025 6:41 PM Dictation workstation:   [id number]     BI US breast limited right  Status: Final result     PACS Images      Show images for BI US breast limited right  Signed by      Signed Time Phone Pager   Rola Henriquez MD 1/22/2025 16:01 [phone]      IMPRESSION:  1. No sonographic evidence of malignancy or lymphadenopathy in the right axilla. Recommend continued follow-up with Breast Surgical Oncology for appropriate management of known extensive right breast malignancy.      BI-RADS CATEGORY:      BI-RADS Category:  2 Benign.  Recommendation:  Breast Surgery follow-up for treatment of known right breast cancer. Recommended Date:  Immediate.  Laterality:  Right.      Signed by: Rola Henriquez 1/22/2025 4:01 PM  Dictation workstation:   [id number]     Pathology Results:  I have reviewed the full pathology report recorded in the EMR. The pertinent portions indicating diagnosis are listed here in the note. for details please refer to the full report recorded in the EMR.    Surgical Pathology [Jul 10 2020 6:44PM] ([id number])  Specimens: LEFT BRAIN MASS /Received fresh for intraoperative consultation, labeled with the patient's   Name TORI TEMPLETON     Accession #: [id number]   Pathologist: BREE CAMARGO MD   Date of Procedure: 6/26/2020    Date Received: 6/26/2020   Date Reported 7/1/2020   Submitting Physician: WESTON OBREGON MD   Location: [facility] Other External #     FINAL DIAGNOSIS   A. LEFT BRAIN MASS, REMOVAL:   -METASTATIC MALIGNANT MELANOMA.     Note: SOX10 immunoreactivity is identified within tumor cell nuclei.     The gross and/or microscopic findings were reviewed in conjunction with pathology resident, Cheng Castro MD.      Assessment and Plan:   Assessment/Plan      Ms. Tori Templeton is a 44 y.o. female with a diagnosis of recurrent metastatic melanoma (BRAF V600E positive). Please see the evolution of the case listed above in the cancer history.     Today,   We reviewed the CT scan report. She is quite concerned with how the report was worded. Since this was a phone visit, I could not  share the screen and review the images with the patient. However, on my personal review, I feel that the lymph nodes in question were not imaged properly in previous scans (due to positioning of the patient). I think, I saw same lymph nodes in 09/2023 scan and they look exactly same.  The usual recourse in patients with suspicious lymphadenopathy noted on CT scans is to do ultrasound-guided biopsy.  However, she recently had a right axillary lymph node assessment on 1/22/2025 when the breast cancer team was conducting the surveillance.  On direct ultrasound, no suspicious lymphadenopathy was noted.  This is an added evidence that she probably does not have any malicious lymph nodes  in the right axilla.    However, considering her diagnosis and high risk of relapse, I recommend another scan in 3 months to follow-up on the axillary lymph nodes. Patient agreed.        DISCLAIMER:   In preparing for this visit and writing this note, I reviewed all the previous electronic medical records (labs, imaging and medical charts) of the patient available in the physician portal. Significant findings which helped in decision making are recorded  in this chart.     The plan was discussed with the patient. We gave her ample opportunities to ask questions. All questions were answered to her satisfaction and she verbalized understanding.      Wilmer Welch MD    Division of Hematology and Oncology  [facility]    Co-Director Sarcoma and Cutaneous Oncology  [facility]    Phone: [phone]  Fax: [phone]

## 2025-02-24 ENCOUNTER — DOCUMENTATION (OUTPATIENT)
Dept: GENETICS | Facility: CLINIC | Age: 45
End: 2025-02-24
Payer: COMMERCIAL

## 2025-02-24 DIAGNOSIS — Z13.71 BRCA NEGATIVE: ICD-10-CM

## 2025-02-24 NOTE — PROGRESS NOTES
Cancer Genetics Chart Update (telephone results note);  Spoke with Tori Taylor to review her recent hereditary cancer genetic test results, which were NEGATIVE (normal). She has access to a copy of her results via her  [employer]. Of note, the RNA portion of the test was not able to be performed, so Tori will be having another blood sample drawn for this, but we are not expecting this to change the results.    Family history (as previously noted):    -Patient, melanoma at age 31, DCIS at age 44;  -Maternal great uncle, skin cancer;  -No other known family history of cancer.     Maternal ancestry is white/Sunita.  Paternal ancestry is Azerbaijani/Native Amerian. There is no known Ashkenazi Faith ancestry or consanguinity.     Genetic test results:  Negative 13-gene BRCAPlus and 76-gene Retrophin CancerNext panels. RNA portion of testing still pending, but  unlikely to impact results.    Genes Analyzed (76 total): AIP, ALK, APC, PARKER, BAP1, BARD1, BMPR1A, BRCA1, BRCA2, BRIP1, CDC73, CDH1, CDK4, CDKN1B, CDKN2A, CEBPA, CHEK2, DICER1, ETV6, FH, FLCN, GATA2, LZTR1, MAX, MEN1, MET, MLH1, MSH2, MSH6, MUTYH, NF1, NF2, NTHL1, PALB2, PHOX2B, PMS2, POT1, NXLMJ8T, PTCH1, PTEN, RAD51C, RAD51D, RB1, RET, RUNX1, SDHA, SDHAF2, SDHB, SDHC, SDHD, SMAD4, SMARCA4, SMARCB1, SMARCE1, STK11, SUFU, KLWY742, TP53, TSC1, TSC2, VHL and WT1 (sequencing and deletion/duplication); AXIN2, CTNNA1, DDX41, EGFR, HOXB13, KIT, MBD4, MITF, MSH3, PDGFRA, POLD1 and POLE (sequencing only); EPCAM and GREM1 (deletion/duplication only).    Genetic counseling:  Tori's results were NEGATIVE, meaning that no disease causing mutations were identified. A genetic cause for her history of cancer has not been identified.    One reason Tori underwent genetic testing was to better understand her risk to develop a second breast cancer to help determine if further breast screening or surgery is indicated. Because their results were negative, Tori does not have an  identifiable genetic risk factor that places her at an increased risk to develop a second breast cancer. She has been recommended to have a right mastectomy as treatment for her DCIS, but has not made a final decision regarding her breast surgery. She should continue being followed by her breast cancer care team.    It was previously discussed that women with BRCA1 and BRCA2 mutations have an increased risk for ovarian cancer. With negative test results and no family history of ovarian cancer, Tori is not considered at increased risk for this cancer type from a genetic standpoint. Prophylactic surgery is not indicated from a genetic standpoint.    We also discussed that Tori should continue to follow with Dermatology given her history of melanoma, as well as to follow her primary care providers' recommendations for all other age-related cancer screenings, such as colon cancer screening, Pap smears, etc.    Although their results were negative, Tori's close female relatives, including her mother, daughter, and nieces, are considered to have an increased risk to develop breast cancer over the general population, based on the family history alone. They should speak to their healthcare providers about their breast cancer screening protocols, as they may be a candidate for annual breast MRIs, in addition to an annual mammogram and clinical breast exam, if their lifetime risk of breast cancer reaches or exceeds 20%. Breast cancer screening should also begin 10 years younger than the youngest diagnosis in the family, or age 40, whichever comes first. This would be age 36 for Tori's relatives. She plans to share this information with her relatives. We also encouraged her to speak with them about regular skin checks, given her history of melanoma. She noted that her two children have already undergone skin checks due to the family history.    A brief family history of Tori's children's father was obtained. She  shared that his father had colon cancer in his mid 50s, and his mother had a glioblastoma in his 60s, as well as that there are some more distant relatives with cancer. This history overall is not really suggestive of hereditary cancer, so based on this, no further genetic testing or additional cancer screenings are recommended for their children.    Our understanding of genetic contribution to cancer diagnoses is always evolving, so there may be additional testing recommended in the future. Tori was asked to keep us apprised as to any changes to their personal and/or family history of cancer, and to contact us in 2-3 years to determine if there have been any changes since our discussion today.    Stephanie Calloway MS, [facility]  Genetic Counselor  [facility]  [phone]

## 2025-02-25 ENCOUNTER — LAB (OUTPATIENT)
Dept: LAB | Facility: HOSPITAL | Age: 45
End: 2025-02-25
Payer: COMMERCIAL

## 2025-02-25 ENCOUNTER — TELEPHONE (OUTPATIENT)
Dept: SURGICAL ONCOLOGY | Facility: CLINIC | Age: 45
End: 2025-02-25
Payer: COMMERCIAL

## 2025-02-25 DIAGNOSIS — D05.11 INTRADUCTAL CARCINOMA IN SITU OF RIGHT BREAST: Primary | ICD-10-CM

## 2025-02-25 NOTE — TELEPHONE ENCOUNTER
Attempted to reach Tori to see how she would like to proceed with surgery. Left VM requesting return call.

## 2025-02-26 ENCOUNTER — TELEPHONE (OUTPATIENT)
Dept: SURGICAL ONCOLOGY | Facility: CLINIC | Age: 45
End: 2025-02-26
Payer: COMMERCIAL

## 2025-02-26 NOTE — TELEPHONE ENCOUNTER
Spoke with Tori about surgical plan. She would like to proceed with mastectomy and [place] procedure. We will contact her with surgical date later this week.

## 2025-02-28 ENCOUNTER — PREP FOR PROCEDURE (OUTPATIENT)
Dept: SURGICAL ONCOLOGY | Facility: CLINIC | Age: 45
End: 2025-02-28
Payer: COMMERCIAL

## 2025-02-28 DIAGNOSIS — D05.11 DUCTAL CARCINOMA IN SITU (DCIS) OF RIGHT BREAST: ICD-10-CM

## 2025-03-03 LAB
COMMENTS - MP RESULT TYPE: NORMAL
SCAN RESULT: NORMAL

## 2025-03-07 LAB
COMMENTS - MP RESULT TYPE: NORMAL
SCAN RESULT: NORMAL

## 2025-03-19 ENCOUNTER — PREP FOR PROCEDURE (OUTPATIENT)
Dept: SURGICAL ONCOLOGY | Facility: CLINIC | Age: 45
End: 2025-03-19
Payer: COMMERCIAL

## 2025-03-21 ENCOUNTER — TELEPHONE (OUTPATIENT)
Dept: PREADMISSION TESTING | Facility: HOSPITAL | Age: 45
End: 2025-03-21
Payer: COMMERCIAL

## 2025-03-21 NOTE — TELEPHONE ENCOUNTER
**3/21-Called patient to schedule PAT, she advised that she was available to come for appt on 4/1/2025 due to having another appointment on the same day.  Our appt availability is early appts and she is unable to come due to the time being to early and she lives quite a distance away, also time from work.  Emailed surgeon--CDS

## 2025-03-25 ENCOUNTER — TELEPHONE (OUTPATIENT)
Dept: PREADMISSION TESTING | Facility: HOSPITAL | Age: 45
End: 2025-03-25
Payer: COMMERCIAL

## 2025-03-25 NOTE — TELEPHONE ENCOUNTER
**3/25-called patient, no answer/emailed surgeons office to update. Patient was previously called on 3/21/2025--CDS

## 2025-03-27 ENCOUNTER — PREP FOR PROCEDURE (OUTPATIENT)
Dept: SURGICAL ONCOLOGY | Facility: CLINIC | Age: 45
End: 2025-03-27
Payer: COMMERCIAL

## 2025-03-31 ENCOUNTER — APPOINTMENT (OUTPATIENT)
Dept: SURGICAL ONCOLOGY | Facility: CLINIC | Age: 45
End: 2025-03-31
Payer: COMMERCIAL

## 2025-03-31 ENCOUNTER — CLINICAL SUPPORT (OUTPATIENT)
Dept: PREADMISSION TESTING | Facility: HOSPITAL | Age: 45
End: 2025-03-31
Payer: COMMERCIAL

## 2025-03-31 DIAGNOSIS — D05.11 DUCTAL CARCINOMA IN SITU (DCIS) OF RIGHT BREAST: ICD-10-CM

## 2025-03-31 NOTE — CPM/PAT NURSE NOTE
CPM/PAT Nurse Note      Name: Tori Taylor (Tori Taylor)  /Age: 1980/44 y.o.       Past Medical History:   Diagnosis Date    Acquired hypothyroidism     Allergy-induced asthma (HHS-HCC)     Anxiety     Ductal carcinoma in situ (DCIS) of right breast     Plan: Right Breast Mastectomy 2025    H/O echocardiogram 2021    CONCLUSIONS:  1. The left ventricular systolic function is normal with a 55% estimated ejection fraction.  2. There is mitral stenosis is not seen.  3. Aortic valve stenosis is not present.  4. The main pulmonary artery is normal in size, and position, with normal bifurcation into the left and right pulmonary arteries.    Lymphedema     Melanoma in situ of the skin (Multi)     melanoma diagnosed in  s/p local excision of right forearm with partial thickness flap and axillary LND s/p 10 months  of adjuvant interferon alpha therapy, stopped due to thyroiditis.    Metastasis to brain (Multi)     Solitary brain metastasis left parietal lobe; s/p left-sided craniotomy for hemorrhagic tumor resection and duraplasty with pericranial graft by Dr. Ruth Veliz  on 2020    Obesity     Pulmonary nodule     4 mm solitary RUL; 2023: CT C/A/P: Stable pulmonary nodule    Seizures (Multi)     2020 focal seizure.  MRI more  suggesting of immunotherapy flare rather than progression of disease (missed last 2 doses due to symptoms). Completed Keppra therapy    Vitamin B12 deficiency     Vitamin D deficiency        Past Surgical History:   Procedure Laterality Date    BREAST BIOPSY       SECTION, CLASSIC      CHOLECYSTECTOMY      CRANIOTOMY Left 2020    LYMPH NODE BIOPSY      LYMPHADENECTOMY      Axillary Lymphadenectomy    OVARIAN CYST DRAINAGE Left     SENTINEL LYMPH NODE BIOPSY      SKIN CANCER EXCISION      local excision of right forearm with partial thickness flap    SKIN GRAFT      THYROIDECTOMY, PARTIAL      WISDOM TOOTH EXTRACTION         Patient   "reports being sexually active and has had partner(s) who are male. She reports using the following method of birth control/protection: I.U.D..    Family History   Problem Relation Name Age of Onset    Deep vein thrombosis Mother Kendra Lane 40    Other (benign breast tumor) Mother Kendra Lane     Blood clot Mother Kendra Lane     Diabetes Father Obed Azevedo     No Known Problems Brother      Hypertension Maternal Grandmother Willow Rizzo ()     Asthma Son Ryland        Allergies   Allergen Reactions    Levalbuterol Hcl Anaphylaxis    Adhesive Tape-Silicones Hives     Able to tolerate paper tape only     Codeine Hives    Sulfa (Sulfonamide Antibiotics) Nausea/vomiting       Prior to Admission medications    Medication Sig Start Date End Date Taking? Authorizing Provider   acetaminophen (Tylenol) 500 mg tablet Take 1 tablet (500 mg) by mouth. 21   Historical Provider, MD   albuterol 90 mcg/actuation inhaler Inhale 2 puffs 4 times a day. 24   Gracia Amador, APRN-CNP   BD Luer-Juliana Syringe 3 mL 25 gauge x 1\" syringe USE 1 SYRINGE TWICE WEEKLY FOR B12 INJECTIONS 24   Silvia Sanon MD   cholecalciferol (Vitamin D-3) 5,000 Units tablet Take 1 tablet (5,000 Units) by mouth once daily. 22   Historical Provider, MD   copper (ParaGard T 380A) 380 square mm IUD by intrauterine route.    Historical Provider, MD   cyanocobalamin (Vitamin B-12) 1,000 mcg/mL injection Inject 1 mL (1,000 mcg) into the muscle 2 times a week. 10/21/24 10/21/25  Silvia Sanon MD   fexofenadine (Allegra) 180 mg tablet Take 1 tablet (180 mg) by mouth once daily as needed.    Historical Provider, MD   insulin syringe-needle U-100 1/2 mL 30 gauge syringe  25   Historical Provider, MD   insulin syringe-needle U-100 30G X 1/2\" 0.3 mL syringe Use Once monthly for b 12 shot 10/18/24 10/18/25  Silvia Sanon MD   mometasone (Nasonex) 50 mcg/actuation nasal spray Administer 1 spray into " affected nostril(s) once daily.    Historical Provider, MD JOIE JOAQUIN Risk Score    No data to display       Caprini DVT Assessment    No data to display       Modified Frailty Index    No data to display       [id number] Stroke Risk Points  Current as of just now        N/A 0 to 9 Points:      Last Change: N/A          The [id number]-[id number] risk score (Lip MADHURI, et al. 2009. © 2010 [employer]) quantifies the risk of stroke for a patient with atrial fibrillation. For patients without atrial fibrillation or under the age of 18 this score appears as N/A. Higher score values generally indicate higher risk of stroke.        This score is not applicable to this patient. Components are not calculated.          Revised Cardiac Risk Index    No data to display       Apfel Simplified Score    No data to display       Risk Analysis Index Results This Encounter    No data found in the last 10 encounters.       Prodigy: High Risk  Total Score: 0          ARISCAT Score for Postoperative Pulmonary Complications    No data to display       Cruz Perioperative Risk for Myocardial Infarction or Cardiac Arrest (YE)    No data to display         Nurse Plan of Action: RN screening call complete.  Reviewed allergies, medications and pharmacy, medical, surgical and social history with patient.  Chart updated.

## 2025-03-31 NOTE — PROGRESS NOTES
"PLASTIC SURGERY PRE-OP CLINIC NOTE    Subjective :  Patient ID: Tori Taylor  is a 44 y.o. female is here for pre-op appointment     Planned Date of Procedure: 04/09/2025  Planned Surgical Procedure: Right Breast Mastectomy with TE    HPI:   Tori Taylor is a 44 y.o. female is here for pre-operative appointment for the above procedure(s).     Currently, the patient states there were no changes in their medications or medical history.     Patient's vitals are Visit Vitals  /83 (BP Location: Left arm, Patient Position: Sitting, BP Cuff Size: Small adult)   Pulse 56   Temp 36.5 °C (97.7 °F) (Temporal)    today.       Patient has Good Rx Card.     Patient is not on chronic NSAIDs.     MEDICATIONS    Current Outpatient Medications:     acetaminophen (Tylenol Extra Strength) 500 mg tablet, Take 2 tablets (1,000 mg) by mouth every 8 hours for 14 days., Disp: 84 tablet, Rfl: 0    acetaminophen (Tylenol) 500 mg tablet, Take 1 tablet (500 mg) by mouth., Disp: , Rfl:     albuterol 90 mcg/actuation inhaler, Inhale 2 puffs 4 times a day., Disp: 18 g, Rfl: 3    BD Luer-Juliana Syringe 3 mL 25 gauge x 1\" syringe, USE 1 SYRINGE TWICE WEEKLY FOR B12 INJECTIONS, Disp: 8 each, Rfl: 11    celecoxib (CeleBREX) 200 mg capsule, Take 1 capsule (200 mg) by mouth 2 times a day for 14 days., Disp: 28 capsule, Rfl: 0    chlorhexidine (Hibiclens) 4 % external liquid, Apply topically 1 time for 1 dose. Shower normally. Apply Hibiclens from clavicle to hip making sure to apply under the breast, axilla and belly button. Do not worry about the back. Do not wash off., Disp: 118 mL, Rfl: 0    cholecalciferol (Vitamin D-3) 5,000 Units tablet, Take 1 tablet (5,000 Units) by mouth once daily., Disp: , Rfl:     copper (ParaGard T 380A) 380 square mm IUD, by intrauterine route., Disp: , Rfl:     cyanocobalamin (Vitamin B-12) 1,000 mcg/mL injection, Inject 1 mL (1,000 mcg) into the muscle 2 times a week., Disp: 10 mL, Rfl: 0    doxycycline " "(Vibramycin) 100 mg capsule, Take 1 capsule (100 mg) by mouth 2 times a day for 7 days. Take with at least 8 ounces (large glass) of water, do not lie down for 30 minutes after, Disp: 14 capsule, Rfl: 0    fexofenadine (Allegra) 180 mg tablet, Take 1 tablet (180 mg) by mouth once daily as needed., Disp: , Rfl:     gabapentin (Neurontin) 300 mg capsule, Take 1 capsule (300 mg) by mouth every 8 hours for 14 days., Disp: 42 capsule, Rfl: 0    insulin syringe-needle U-100 1/2 mL 30 gauge syringe, , Disp: , Rfl:     insulin syringe-needle U-100 30G X 1/2\" 0.3 mL syringe, Use Once monthly for b 12 shot, Disp: 15 each, Rfl: 1    mometasone (Nasonex) 50 mcg/actuation nasal spray, Administer 1 spray into affected nostril(s) once daily., Disp: , Rfl:      REVIEW OF SYSTEMS:    Constitutional: negative for fevers, chills, unintentional weight loss  HEENT: negative for changes in vision, headaches, changes in hearing, congestion, sore throat  Cardiovascular: negative for chest pain, palpitations  Respiratory: negative for cough, shortness of breath  Gastrointestinal: negative for nausea, vomiting, diarrhea  Genitourinary: negative for dysuria, hematuria  Musculoskeletal: negative for joint swelling or pain  Skin: negative for rashes or lesions  Psych: negative for depression, anxiety  Endocrine: negative for polyuria, polydipsia, cold/heat intolerance  Hem/Lymph: negative for bleeding disorder    PHYSICAL EXAM  General: alert and oriented, no apparent distress  Psych: normal mood and affect, judgement intact.   Eyes: No conjunctival erythema, extraocular movements intact, no scleral icterus, pupils equal and reactive to light  HENT: normocephalic, atraumatic, no rhinorrhea, no trauma to external meatus, no prior surgical scars  CV: hemodynamically stable  Resp: unlabored, no increased work of breathing, equal bilateral chest rise, no cough or stridor  Abdomen: soft, non-tender, non-distended. No surgical scars present. No " rashes noted. No rectus diastasis present   Neuro: Unremarkable without focal findings, AAOx3, CN 2-12 grossly intact  Extremities/Musculoskeletal: Upper and lower extremities are atraumatic in appearance without tenderness or deformity. No swelling or erythema. Full range of motion is noted to all joints. Steady gait noted.    Skin: Intact, soft and warm; no rashes, lesions, or bruising. No large masses or keloids noted on exam.     Focused exam of the breasts:   - Right Breast: Grade 3 Ptosis. NAC with intact sensation. No nipple retraction or drainage noted. No palpable masses.    - Left Breast: Grade 2 Ptosis. NAC with intact sensation. No nipple retraction or drainage noted. No palpable masses. or drainage noted. No palpable masses.       LABORATORY  Nutrition  Lab Results   Component Value Date    ALBUMIN 4.5 02/12/2025          ASSESSMENT/PLAN  Tori Taylor is a 44 y.o. female with hx of  right breast DCIS , ER positive      The patient will be undergoing Right Breast Mastectomy with TE on 04/09/2025 at [facility]    Informed consent discussed with the patient, including: condition, proposed care, treatments and services, alternative forms of treatment, and risks of no treatment.  Details discussed around the procedures to be used, and the risks and hazards involved, potential benefits, and side effects of the patient's proposed care, treatment, and services; the likelihood of the patient achieving his or her goals; and any potential problems that might occur during recuperation. Reasonable alternative also discussed with the patient's proposed care, treatment, and services. The discussion encompasses risks, benefits, and side effects related to the alternative and risks related to not receiving the proposed care, treatment, and services. Written informed consent was obtained.    The patient was counseled to avoid anticoagulation medications and herbals including - but not limited to - ASA, NSAIDs, Plavix,  fish oils, and green tea    Patient was counseled to avoid nicotine and areas with high amounts of secondhand smoke.     Patient was counseled to increase protein intake after surgery to aid with wound healing with goal of 120g/day.     Patient was counseled on need for compression garments and/or support bras, given information about where to acquire these garments, and instructions to bring with on the day of surgery.     We discussed postoperative follow-up visits, recuperation and return to work.    Advised patient to contact office with any questions or concerns    All findings and diagnosis was discussed with patient at the time of this visit. Patient states they understand and are in agreement with the treatment plan at the time of this visit.       Medications eRx'd:  -Celebrex 200 mg BID with meals- Good Rx card provided to the patient.  -Gabapentin 300 mg Q8H   -Tylenol 1000 mg Q8H  -Doxycycline 100 mg BID x 7 days   -Hibiclens - instructed the patient to wash breast and/or abdomen and margins the night before surgery or the morning of surgery; avoid washing face/eyes (2 packets if breast only, and 5 packets if breast and abdomen)      RTC 4/22/25 after surgery     Scribe Attestation  By signing my name below, MARIAA Felibertosrinivasa Garcia, Scribe, attest that this documentation has been prepared under the direction and in the presence of Barbara Lira MD. Verbal consent obtained from the patient.      Attending Attestation:  Barbara LEROY MD, personal performed the history, exam, and decision making on this patient.  A total of 30 mins were spent in patient counseling, review of medical records, and coordination of care.

## 2025-04-01 ENCOUNTER — OFFICE VISIT (OUTPATIENT)
Dept: PLASTIC SURGERY | Facility: CLINIC | Age: 45
End: 2025-04-01
Payer: COMMERCIAL

## 2025-04-01 VITALS
SYSTOLIC BLOOD PRESSURE: 121 MMHG | DIASTOLIC BLOOD PRESSURE: 83 MMHG | OXYGEN SATURATION: 100 % | TEMPERATURE: 97.7 F | HEART RATE: 56 BPM | WEIGHT: 204 LBS | BODY MASS INDEX: 31.03 KG/M2

## 2025-04-01 DIAGNOSIS — D05.11 DUCTAL CARCINOMA IN SITU (DCIS) OF RIGHT BREAST: Primary | ICD-10-CM

## 2025-04-01 PROCEDURE — 99214 OFFICE O/P EST MOD 30 MIN: CPT | Performed by: SURGERY

## 2025-04-01 RX ORDER — CHLORHEXIDINE GLUCONATE 40 MG/ML
SOLUTION TOPICAL ONCE
Qty: 118 ML | Refills: 0 | Status: SHIPPED | OUTPATIENT
Start: 2025-04-01 | End: 2025-04-01

## 2025-04-01 RX ORDER — ACETAMINOPHEN 500 MG
1000 TABLET ORAL EVERY 8 HOURS
Qty: 84 TABLET | Refills: 0 | Status: SHIPPED | OUTPATIENT
Start: 2025-04-01 | End: 2025-04-15

## 2025-04-01 RX ORDER — GABAPENTIN 300 MG/1
300 CAPSULE ORAL EVERY 8 HOURS
Qty: 42 CAPSULE | Refills: 0 | Status: SHIPPED | OUTPATIENT
Start: 2025-04-01 | End: 2025-04-15

## 2025-04-01 RX ORDER — DOXYCYCLINE 100 MG/1
100 CAPSULE ORAL 2 TIMES DAILY
Qty: 14 CAPSULE | Refills: 0 | Status: SHIPPED | OUTPATIENT
Start: 2025-04-01 | End: 2025-04-09 | Stop reason: HOSPADM

## 2025-04-01 RX ORDER — CELECOXIB 200 MG/1
200 CAPSULE ORAL 2 TIMES DAILY
Qty: 28 CAPSULE | Refills: 0 | Status: SHIPPED | OUTPATIENT
Start: 2025-04-01 | End: 2025-04-15

## 2025-04-01 ASSESSMENT — PAIN SCALES - GENERAL: PAINLEVEL_OUTOF10: 0-NO PAIN

## 2025-04-03 ENCOUNTER — PRE-ADMISSION TESTING (OUTPATIENT)
Dept: PREADMISSION TESTING | Facility: HOSPITAL | Age: 45
End: 2025-04-03
Payer: COMMERCIAL

## 2025-04-03 ENCOUNTER — LAB (OUTPATIENT)
Dept: LAB | Facility: HOSPITAL | Age: 45
End: 2025-04-03
Payer: COMMERCIAL

## 2025-04-03 VITALS
HEART RATE: 62 BPM | OXYGEN SATURATION: 98 % | RESPIRATION RATE: 18 BRPM | DIASTOLIC BLOOD PRESSURE: 70 MMHG | TEMPERATURE: 98.3 F | BODY MASS INDEX: 30.77 KG/M2 | SYSTOLIC BLOOD PRESSURE: 113 MMHG | HEIGHT: 68 IN | WEIGHT: 203.04 LBS

## 2025-04-03 DIAGNOSIS — Z01.818 PREOP TESTING: ICD-10-CM

## 2025-04-03 DIAGNOSIS — E66.811 OBESITY (BMI 30.0-34.9): ICD-10-CM

## 2025-04-03 DIAGNOSIS — R73.09 ELEVATED GLUCOSE: Primary | ICD-10-CM

## 2025-04-03 DIAGNOSIS — Z01.818 ENCOUNTER FOR OTHER PREPROCEDURAL EXAMINATION: Primary | ICD-10-CM

## 2025-04-03 LAB
ANION GAP SERPL CALC-SCNC: 10 MMOL/L (ref 10–20)
BASOPHILS # BLD AUTO: 0.07 X10*3/UL (ref 0–0.1)
BASOPHILS NFR BLD AUTO: 0.8 %
BUN SERPL-MCNC: 11 MG/DL (ref 6–23)
CALCIUM SERPL-MCNC: 9.5 MG/DL (ref 8.6–10.3)
CHLORIDE SERPL-SCNC: 103 MMOL/L (ref 98–107)
CO2 SERPL-SCNC: 29 MMOL/L (ref 21–32)
CREAT SERPL-MCNC: 0.73 MG/DL (ref 0.5–1.05)
EGFRCR SERPLBLD CKD-EPI 2021: >90 ML/MIN/1.73M*2
EOSINOPHIL # BLD AUTO: 0.52 X10*3/UL (ref 0–0.7)
EOSINOPHIL NFR BLD AUTO: 6.1 %
ERYTHROCYTE [DISTWIDTH] IN BLOOD BY AUTOMATED COUNT: 12.8 % (ref 11.5–14.5)
GLUCOSE SERPL-MCNC: 93 MG/DL (ref 74–99)
HCT VFR BLD AUTO: 40.6 % (ref 36–46)
HGB BLD-MCNC: 13.5 G/DL (ref 12–16)
IMM GRANULOCYTES # BLD AUTO: 0.03 X10*3/UL (ref 0–0.7)
IMM GRANULOCYTES NFR BLD AUTO: 0.4 % (ref 0–0.9)
LYMPHOCYTES # BLD AUTO: 2.78 X10*3/UL (ref 1.2–4.8)
LYMPHOCYTES NFR BLD AUTO: 32.7 %
MCH RBC QN AUTO: 32 PG (ref 26–34)
MCHC RBC AUTO-ENTMCNC: 33.3 G/DL (ref 32–36)
MCV RBC AUTO: 96 FL (ref 80–100)
MONOCYTES # BLD AUTO: 0.68 X10*3/UL (ref 0.1–1)
MONOCYTES NFR BLD AUTO: 8 %
NEUTROPHILS # BLD AUTO: 4.43 X10*3/UL (ref 1.2–7.7)
NEUTROPHILS NFR BLD AUTO: 52 %
NRBC BLD-RTO: 0 /100 WBCS (ref 0–0)
PLATELET # BLD AUTO: 230 X10*3/UL (ref 150–450)
POTASSIUM SERPL-SCNC: 4.1 MMOL/L (ref 3.5–5.3)
RBC # BLD AUTO: 4.22 X10*6/UL (ref 4–5.2)
SODIUM SERPL-SCNC: 138 MMOL/L (ref 136–145)
WBC # BLD AUTO: 8.5 X10*3/UL (ref 4.4–11.3)

## 2025-04-03 PROCEDURE — 80048 BASIC METABOLIC PNL TOTAL CA: CPT

## 2025-04-03 PROCEDURE — 93010 ELECTROCARDIOGRAM REPORT: CPT | Performed by: INTERNAL MEDICINE

## 2025-04-03 PROCEDURE — 99204 OFFICE O/P NEW MOD 45 MIN: CPT | Performed by: NURSE PRACTITIONER

## 2025-04-03 PROCEDURE — 87081 CULTURE SCREEN ONLY: CPT | Mod: AHULAB | Performed by: NURSE PRACTITIONER

## 2025-04-03 PROCEDURE — 85025 COMPLETE CBC W/AUTO DIFF WBC: CPT

## 2025-04-03 PROCEDURE — 93005 ELECTROCARDIOGRAM TRACING: CPT | Performed by: NURSE PRACTITIONER

## 2025-04-03 RX ORDER — CHLORHEXIDINE GLUCONATE ORAL RINSE 1.2 MG/ML
SOLUTION DENTAL
Qty: 473 ML | Refills: 0 | Status: SHIPPED | OUTPATIENT
Start: 2025-04-03

## 2025-04-03 ASSESSMENT — ENCOUNTER SYMPTOMS
CARDIOVASCULAR NEGATIVE: 1
MUSCULOSKELETAL NEGATIVE: 1
NEUROLOGICAL NEGATIVE: 1
NECK NEGATIVE: 1
RESPIRATORY NEGATIVE: 1
GASTROINTESTINAL NEGATIVE: 1
ENDOCRINE NEGATIVE: 1

## 2025-04-03 NOTE — CPM/PAT NURSE NOTE
CPM/PAT Nurse Note      Name: Tori Taylor (Tori Taylor)  /Age: 1980/44 y.o.       Past Medical History:   Diagnosis Date    Acquired hypothyroidism     Allergy-induced asthma (HHS-HCC)     Anxiety     Ductal carcinoma in situ (DCIS) of right breast     Plan: Right Breast Mastectomy 2025    H/O echocardiogram 2021    CONCLUSIONS:  1. The left ventricular systolic function is normal with a 55% estimated ejection fraction.  2. There is mitral stenosis is not seen.  3. Aortic valve stenosis is not present.  4. The main pulmonary artery is normal in size, and position, with normal bifurcation into the left and right pulmonary arteries.    Lymphedema     Melanoma in situ of the skin (Multi)     melanoma diagnosed in  s/p local excision of right forearm with partial thickness flap and axillary LND s/p 10 months  of adjuvant interferon alpha therapy, stopped due to thyroiditis.    Metastasis to brain (Multi)     Solitary brain metastasis left parietal lobe; s/p left-sided craniotomy for hemorrhagic tumor resection and duraplasty with pericranial graft by Dr. Ruth Veliz  on 2020    Obesity     Pulmonary nodule     4 mm solitary RUL; 2023: CT C/A/P: Stable pulmonary nodule    Seizures (Multi)     2020 focal seizure.  MRI more  suggesting of immunotherapy flare rather than progression of disease (missed last 2 doses due to symptoms). Completed Keppra therapy    Vitamin B12 deficiency     Vitamin D deficiency        Past Surgical History:   Procedure Laterality Date    BREAST BIOPSY       SECTION, CLASSIC      CHOLECYSTECTOMY      CRANIOTOMY Left 2020    LYMPH NODE BIOPSY      LYMPHADENECTOMY      Axillary Lymphadenectomy    OVARIAN CYST DRAINAGE Left     SENTINEL LYMPH NODE BIOPSY      SKIN CANCER EXCISION      local excision of right forearm with partial thickness flap    SKIN GRAFT      THYROIDECTOMY, PARTIAL      WISDOM TOOTH EXTRACTION         Patient   "reports being sexually active and has had partner(s) who are male. She reports using the following method of birth control/protection: I.U.D..    Family History   Problem Relation Name Age of Onset    Deep vein thrombosis Mother Kendra Lane 40    Other (benign breast tumor) Mother Kendra Lane     Blood clot Mother Kendra Lane     Diabetes Father Obed Azevedo     No Known Problems Brother      Hypertension Maternal Grandmother Willow Rizzo ()     Asthma Son Ryland        Allergies   Allergen Reactions    Levalbuterol Hcl Anaphylaxis    Adhesive Tape-Silicones Hives     Able to tolerate paper tape only     Codeine Hives    Sulfa (Sulfonamide Antibiotics) Nausea/vomiting       Prior to Admission medications    Medication Sig Start Date End Date Taking? Authorizing Provider   acetaminophen (Tylenol Extra Strength) 500 mg tablet Take 2 tablets (1,000 mg) by mouth every 8 hours for 14 days. 4/1/25 4/15/25 Yes Barbara Lira MD   acetaminophen (Tylenol) 500 mg tablet Take 1 tablet (500 mg) by mouth. 21  Yes Historical Provider, MD   albuterol 90 mcg/actuation inhaler Inhale 2 puffs 4 times a day. 24  Yes Gracia Amador, APRN-CNP   cholecalciferol (Vitamin D-3) 5,000 Units tablet Take 1 tablet (5,000 Units) by mouth once daily. 22  Yes Historical Provider, MD   cyanocobalamin (Vitamin B-12) 1,000 mcg/mL injection Inject 1 mL (1,000 mcg) into the muscle 2 times a week. 10/21/24 10/21/25 Yes Silvia Sanon MD   fexofenadine (Allegra) 180 mg tablet Take 1 tablet (180 mg) by mouth once daily as needed.   Yes Historical Provider, MD   mometasone (Nasonex) 50 mcg/actuation nasal spray Administer 1 spray into affected nostril(s) once daily.   Yes Historical Provider, MD   BD Luer-Juliana Syringe 3 mL 25 gauge x 1\" syringe USE 1 SYRINGE TWICE WEEKLY FOR B12 INJECTIONS 24   Silvia Sanon MD   celecoxib (CeleBREX) 200 mg capsule Take 1 capsule (200 mg) by mouth 2 times a day for " "14 days. 4/1/25 4/15/25  Barbara Lira MD   chlorhexidine (Hibiclens) 4 % external liquid Apply topically 1 time for 1 dose. Shower normally. Apply Hibiclens from clavicle to hip making sure to apply under the breast, axilla and belly button. Do not worry about the back. Do not wash off. 4/1/25 4/1/25  Barbara Lira MD   chlorhexidine (Peridex) 0.12 % solution SWISH AND SPIT 15ML FOR 30 SECONDS NIGHT PRIOR TO SURGERY AND MORNING OF SURGERY 4/3/25   Alia Faria APRN-CNP   copper (ParaGard T 380A) 380 square mm IUD by intrauterine route.    Historical Provider, MD   doxycycline (Vibramycin) 100 mg capsule Take 1 capsule (100 mg) by mouth 2 times a day for 7 days. Take with at least 8 ounces (large glass) of water, do not lie down for 30 minutes after 4/1/25 4/8/25  Barbara Lira MD   gabapentin (Neurontin) 300 mg capsule Take 1 capsule (300 mg) by mouth every 8 hours for 14 days. 4/1/25 4/15/25  Barbara Lira MD   insulin syringe-needle U-100 1/2 mL 30 gauge syringe  1/22/25   Historical Provider, MD   insulin syringe-needle U-100 30G X 1/2\" 0.3 mL syringe Use Once monthly for b 12 shot 10/18/24 10/18/25  Silvia Sanon MD        PAT ROS     DASI Risk Score    No data to display       Caprini DVT Assessment    No data to display       Modified Frailty Index    No data to display       [id number] Stroke Risk Points  Current as of 21 minutes ago        N/A 0 to 9 Points:      Last Change: N/A          The [id number] risk score (Lip GH, et al. 2009. © 2010 American [employer] Chest [employer]) quantifies the risk of stroke for a patient with atrial fibrillation. For patients without atrial fibrillation or under the age of 18 this score appears as N/A. Higher score values generally indicate higher risk of stroke.        This score is not applicable to this patient. Components are not calculated.          Revised Cardiac Risk Index    No data to display       Apfel Simplified Score    No data to display   "     Risk Analysis Index Results This Encounter    No data found in the last 10 encounters.       Prodigy: High Risk  Total Score: 0          ARISCAT Score for Postoperative Pulmonary Complications    No data to display       Cruz Perioperative Risk for Myocardial Infarction or Cardiac Arrest (YE)    No data to display         Nurse Plan of Action:       After Visit Summary (AVS) reviewed and patient verbalized good understanding of medications and NPO instructions.  Pre-op infection prevention measures:  CHG showers and mouthwash reviewed, understanding voiced.  CHG soap given and patient verbalized need to pick CHG mouthwash at their preferred local pharmacy.

## 2025-04-03 NOTE — H&P (VIEW-ONLY)
[facility]/PAT Evaluation       Name: Tori Taylor (Tori Taylor)  /Age: 1980/44 y.o.     In-Person         Date of Consult: 4/3/25    Referring Provider:  Dr. Quynh Maguire, Dr. Barbara Lira    Date, Surgery, and Length:  25, right breast mastectomy, right breast tissue expander, right breast adjacent tissue rearrangement, 300 minutes    Patient presents to [facility] for perioperative risk assessment prior to scheduled surgery. Patient presents with right breast DCIS ER positive.         This note was created in part upon personal review of patient's medical records.      Pt denies any past history of anesthetic complications such as PONV, awareness, prolonged sedation, dental damage, aspiration, cardiac arrest, difficult intubation, difficult I.V. access or unexpected hospital admissions. No history of malignant hyperthermia and or pseudocholinesterase deficiency.    No history of blood transfusions.    The patient IS NOT a [employer] and will accept blood and blood products if medically indicated.     Type and screen not sent.      Past Medical History:   Diagnosis Date    Acquired hypothyroidism     Allergy-induced asthma (HHS-HCC)     Anxiety     Ductal carcinoma in situ (DCIS) of right breast     Plan: Right Breast Mastectomy 2025    H/O echocardiogram 2021    CONCLUSIONS:  1. The left ventricular systolic function is normal with a 55% estimated ejection fraction.  2. There is mitral stenosis is not seen.  3. Aortic valve stenosis is not present.  4. The main pulmonary artery is normal in size, and position, with normal bifurcation into the left and right pulmonary arteries.    Lymphedema     Melanoma in situ of the skin (Multi)     melanoma diagnosed in  s/p local excision of right forearm with partial thickness flap and axillary LND s/p 10 months  of adjuvant interferon alpha therapy, stopped due to thyroiditis.    Metastasis to brain (Multi)     Solitary brain metastasis  left parietal lobe; s/p left-sided craniotomy for hemorrhagic tumor resection and duraplasty with pericranial graft by Dr. Ruth Veliz  on 2020    Obesity     Pulmonary nodule     4 mm solitary RUL; 2023: CT C/A/P: Stable pulmonary nodule    Seizures (Multi)     2020 focal seizure.  MRI more  suggesting of immunotherapy flare rather than progression of disease (missed last 2 doses due to symptoms). Completed Keppra therapy. TESTING NOT INDICATIVE OF ANY SEIZURE ACTIVITY    Vitamin B12 deficiency     Vitamin D deficiency        Past Surgical History:   Procedure Laterality Date    BREAST BIOPSY       SECTION, CLASSIC      CHOLECYSTECTOMY      CRANIOTOMY Left 2020    LYMPH NODE BIOPSY      LYMPHADENECTOMY      Axillary Lymphadenectomy    OVARIAN CYST DRAINAGE Left     SENTINEL LYMPH NODE BIOPSY      SKIN CANCER EXCISION      local excision of right forearm with partial thickness flap    SKIN GRAFT      THYROIDECTOMY, PARTIAL      WISDOM TOOTH EXTRACTION         Family History   Problem Relation Name Age of Onset    Deep vein thrombosis Mother Kendra Lane 40    Other (benign breast tumor) Mother Kendra Lane     Blood clot Mother Kendra Lane     Diabetes Father Obed Azevedo     No Known Problems Brother      Hypertension Maternal Grandmother Willow Rizzo ()     Asthma Son Ryland        Allergies   Allergen Reactions    Levalbuterol Hcl Anaphylaxis    Adhesive Tape-Silicones Hives     Able to tolerate paper tape only     Codeine Hives    Sulfa (Sulfonamide Antibiotics) Nausea/vomiting     Social History     Tobacco Use   Smoking Status Former    Current packs/day: 0.00    Types: Cigarettes    Quit date:     Years since quittin.2   Smokeless Tobacco Never   Tobacco Comments    Light social smoker      Social History     Substance and Sexual Activity   Alcohol Use Not Currently     Social History     Substance and Sexual Activity   Drug Use Never       Current  "Outpatient Medications   Medication Instructions    acetaminophen (TYLENOL EXTRA STRENGTH) 1,000 mg, oral, Every 8 hours    acetaminophen (TYLENOL) 500 mg    albuterol 90 mcg/actuation inhaler 2 puffs, inhalation, 4 times daily RT    BD Luer-Juliana Syringe 3 mL 25 gauge x 1\" syringe USE 1 SYRINGE TWICE WEEKLY FOR B12 INJECTIONS    celecoxib (CELEBREX) 200 mg, oral, 2 times daily    chlorhexidine (Peridex) 0.12 % solution SWISH AND SPIT 15ML FOR 30 SECONDS NIGHT PRIOR TO SURGERY AND MORNING OF SURGERY    cholecalciferol (Vitamin D-3) 5,000 Units tablet 1 tablet, Daily    copper (ParaGard T 380A) 380 square mm IUD by intrauterine route.    cyanocobalamin (VITAMIN B-12) 1,000 mcg, intramuscular, 2 times weekly    doxycycline (VIBRAMYCIN) 100 mg, oral, 2 times daily, Take with at least 8 ounces (large glass) of water, do not lie down for 30 minutes after    fexofenadine (Allegra) 180 mg tablet 1 tablet, Daily PRN    gabapentin (NEURONTIN) 300 mg, oral, Every 8 hours    insulin syringe-needle U-100 1/2 mL 30 gauge syringe     insulin syringe-needle U-100 30G X 1/2\" 0.3 mL syringe Use Once monthly for b 12 shot    mometasone (Nasonex) 50 mcg/actuation nasal spray 1 spray, Daily          PAT ROS:   Constitutional:    Hot flashes  Neuro/Psych:   neg    Eyes:    use of corrective lenses  Ears:   neg    Nose:   neg    Mouth:   neg    Throat:   neg    Neck:   neg    Cardio:   neg    Respiratory:   neg    Endocrine:   neg    GI:   neg    :   neg    Musculoskeletal:   neg    Hematologic:   neg    Skin:  neg        Physical Exam  Vitals reviewed. Physical exam within normal limits.          PAT AIRWAY:   Airway:     Mallampati::  II    Neck ROM::  Full  normal        Visit Vitals  /70   Pulse 62   Temp 36.8 °C (98.3 °F) (Temporal)   Resp 18   Ht 1.727 m (5' 8.01\")   Wt 92.1 kg (203 lb 0.7 oz)   SpO2 98%   BMI 30.86 kg/m²   OB Status IUD   Smoking Status Former   BSA 2.1 m²     Assessment and Plan:     Patient is a 44 year " old female scheduled for right breast mastectomy, right breast tissue expander, right breast adjacent tissue rearrangement,  with Dr. Maguire and Dr. Lira on 4/9/25.    Patient is at acceptable risk to proceed with planned surgical procedure. Further cardiac risk stratification deferred at this time. This patient is INTERMEDIATE risk candidate undergoing MODERATE risk procedure, patient is medically optimized for surgery.    Plan    Cardiovascular:    RCRI: 0 Risk of Mace: 3.9%      Patient denies any chest pain, tightness, heaviness, pressure, radiating pain, palpitations, irregular heartbeats, lightheadedness, cough, congestion, shortness of breath, MARIANO, PND, near syncope, weight loss or gain.    Good functional capacity  Functional 4 Mets. Patient denies SOB walking up 2 flights of stairs      EKG in PAT   Encounter Date: 04/03/25   ECG 12 Lead   Result Value    Ventricular Rate 62    Atrial Rate 62    UT Interval 132    QRS Duration 92    QT Interval 388    QTC Calculation(Bazett) 393    P Axis -17    R Axis 61    T Axis 63    QRS Count 10    Q Onset 218    P Onset 152    P Offset 195    T Offset 412    QTC Fredericia 392    Narrative    Normal sinus rhythm  Normal ECG  When compared with ECG of 06-DEC-2020 18:30,  No significant change was found  Confirmed by Bernard David ([id number]) on 4/4/2025 6:47:58 PM     Pulmonary:  Stop Bang score is 2 placing patient at low risk for SCOTT  ARISCAT: 26-44 points, 13.3% risk of in-hospital postoperative pulmonary complication  PRODIGY: Moderate risk for opioid induced respiratory depression    Renal/endo:  Recommendations to avoid nephrotoxic drugs and carefully monitor fluid status to maintain euvolemia. Use dose adjusted medications as needed for the underlying level of renal function.    Heme:  Patient instructed to ambulate as soon as possible postoperatively to decrease thromboembolic risk.    Initiate mechanical DVT prophylaxis as soon as possible and initiate chemical  prophylaxis when deemed safe from a bleeding standpoint post surgery.    Caprini= 8      Risk assessment complete.  Patient is scheduled for a INTERMEDIATE surgical risk procedure.     She IS considered medically optimized for the planned procedure.      Labs/testing obtained in [facility] on 4/3/25: CBC, BMP, MRSA, EKG    Lab Results   Component Value Date    WBC 8.5 04/03/2025    HGB 13.5 04/03/2025    HCT 40.6 04/03/2025    MCV 96 04/03/2025     04/03/2025     Lab Results   Component Value Date    GLUCOSE 93 04/03/2025    CALCIUM 9.5 04/03/2025     04/03/2025    K 4.1 04/03/2025    CO2 29 04/03/2025     04/03/2025    BUN 11 04/03/2025    CREATININE 0.73 04/03/2025       Follow up/communication: none      Preoperative medication instructions were provided and reviewed with the patient.  Any additional testing or evaluation was explained to the patient.  Nothing by mouth instructions were discussed and patient's questions were answered prior to conclusion to this encounter.  Patient verbalized understanding of preoperative instructions given in preadmission testing; discharge instructions available in EMR.    This note was dictated with speech recognition.  Minor errors may have been detected during use of speech recognition.

## 2025-04-03 NOTE — [facility]/PAT H&P
[facility]/PAT Evaluation       Name: Tori Taylor (Tori Taylor)  /Age: 1980/44 y.o.     In-Person         Date of Consult: 4/3/25    Referring Provider:  Dr. Quynh Maguire, Dr. Barbara Lira    Date, Surgery, and Length:  25, right breast mastectomy, right breast tissue expander, right breast adjacent tissue rearrangement, 300 minutes              This note was created in part upon personal review of patient's medical records.        Pt denies any past history of anesthetic complications such as PONV, awareness, prolonged sedation, dental damage, aspiration, cardiac arrest, difficult intubation, difficult I.V. access or unexpected hospital admissions. No history of malignant hyperthermia and or pseudocholinesterase deficiency.    No history of blood transfusions.    The patient IS NOT a [employer] and will accept blood and blood products if medically indicated.     Type and screen not sent.      Past Medical History:   Diagnosis Date    Acquired hypothyroidism     Allergy-induced asthma (HHS-HCC)     Anxiety     Ductal carcinoma in situ (DCIS) of right breast     Plan: Right Breast Mastectomy 2025    H/O echocardiogram 2021    CONCLUSIONS:  1. The left ventricular systolic function is normal with a 55% estimated ejection fraction.  2. There is mitral stenosis is not seen.  3. Aortic valve stenosis is not present.  4. The main pulmonary artery is normal in size, and position, with normal bifurcation into the left and right pulmonary arteries.    Lymphedema     Melanoma in situ of the skin (Multi)     melanoma diagnosed in  s/p local excision of right forearm with partial thickness flap and axillary LND s/p 10 months  of adjuvant interferon alpha therapy, stopped due to thyroiditis.    Metastasis to brain (Multi)     Solitary brain metastasis left parietal lobe; s/p left-sided craniotomy for hemorrhagic tumor resection and duraplasty with pericranial graft by Dr. Ruth Veliz  on  2020    Obesity     Pulmonary nodule     4 mm solitary RUL; 2023: CT C/A/P: Stable pulmonary nodule    Seizures (Multi)     2020 focal seizure.  MRI more  suggesting of immunotherapy flare rather than progression of disease (missed last 2 doses due to symptoms). Completed Keppra therapy. TESTING NOT INDICATIVE OF ANY SEIZURE ACTIVITY    Vitamin B12 deficiency     Vitamin D deficiency        Past Surgical History:   Procedure Laterality Date    BREAST BIOPSY       SECTION, CLASSIC      CHOLECYSTECTOMY      CRANIOTOMY Left 2020    LYMPH NODE BIOPSY      LYMPHADENECTOMY      Axillary Lymphadenectomy    OVARIAN CYST DRAINAGE Left     SENTINEL LYMPH NODE BIOPSY      SKIN CANCER EXCISION      local excision of right forearm with partial thickness flap    SKIN GRAFT      THYROIDECTOMY, PARTIAL      WISDOM TOOTH EXTRACTION         Family History   Problem Relation Name Age of Onset    Deep vein thrombosis Mother Kendra Lane 40    Other (benign breast tumor) Mother Kendra Lane     Blood clot Mother Kendra Lane     Diabetes Father Obed Azevedo     No Known Problems Brother      Hypertension Maternal Grandmother Willow Rizzo ()     Asthma Son Ryland        Allergies   Allergen Reactions    Levalbuterol Hcl Anaphylaxis    Adhesive Tape-Silicones Hives     Able to tolerate paper tape only     Codeine Hives    Sulfa (Sulfonamide Antibiotics) Nausea/vomiting     Social History     Tobacco Use   Smoking Status Former    Current packs/day: 0.00    Types: Cigarettes    Quit date:     Years since quittin.2   Smokeless Tobacco Never   Tobacco Comments    Light social smoker      Social History     Substance and Sexual Activity   Alcohol Use Not Currently     Social History     Substance and Sexual Activity   Drug Use Never       Current Outpatient Medications   Medication Instructions    acetaminophen (TYLENOL EXTRA STRENGTH) 1,000 mg, oral, Every 8 hours    acetaminophen (TYLENOL)  "500 mg    albuterol 90 mcg/actuation inhaler 2 puffs, inhalation, 4 times daily RT    BD Luer-Juliana Syringe 3 mL 25 gauge x 1\" syringe USE 1 SYRINGE TWICE WEEKLY FOR B12 INJECTIONS    celecoxib (CELEBREX) 200 mg, oral, 2 times daily    chlorhexidine (Peridex) 0.12 % solution SWISH AND SPIT 15ML FOR 30 SECONDS NIGHT PRIOR TO SURGERY AND MORNING OF SURGERY    cholecalciferol (Vitamin D-3) 5,000 Units tablet 1 tablet, Daily    copper (ParaGard T 380A) 380 square mm IUD by intrauterine route.    cyanocobalamin (VITAMIN B-12) 1,000 mcg, intramuscular, 2 times weekly    doxycycline (VIBRAMYCIN) 100 mg, oral, 2 times daily, Take with at least 8 ounces (large glass) of water, do not lie down for 30 minutes after    fexofenadine (Allegra) 180 mg tablet 1 tablet, Daily PRN    gabapentin (NEURONTIN) 300 mg, oral, Every 8 hours    insulin syringe-needle U-100 1/2 mL 30 gauge syringe     insulin syringe-needle U-100 30G X 1/2\" 0.3 mL syringe Use Once monthly for b 12 shot    mometasone (Nasonex) 50 mcg/actuation nasal spray 1 spray, Daily          PAT ROS:   Constitutional:    Hot flashes  Neuro/Psych:   neg    Eyes:    use of corrective lenses  Ears:   neg    Nose:   neg    Mouth:   neg    Throat:   neg    Neck:   neg    Cardio:   neg    Respiratory:   neg    Endocrine:   neg    GI:   neg    :   neg    Musculoskeletal:   neg    Hematologic:   neg    Skin:  neg        Physical Exam  Vitals reviewed. Physical exam within normal limits.          PAT AIRWAY:   Airway:     Mallampati::  II    Neck ROM::  Full  normal        Visit Vitals  /70   Pulse 62   Temp 36.8 °C (98.3 °F) (Temporal)   Resp 18   Ht 1.727 m (5' 8.01\")   Wt 92.1 kg (203 lb 0.7 oz)   SpO2 98%   BMI 30.86 kg/m²   OB Status IUD   Smoking Status Former   BSA 2.1 m²     Assessment and Plan:     Patient is a    Patient is at acceptable risk to proceed with planned surgical procedure. Further cardiac risk stratification deferred at this time.This patient is " LOW/INTERMEDIATE/HIGH risk candidate undergoing LOW/MODERATE/HIGH risk procedure, patient is medically optimized for surgery.    Plan      Neuro:  {CPMPATNEURO:68182}  {CPMPATDELIRIUMRISKFACTORS:98703}  {CPMPATSTROKE:08620}  {CPMPATCVARISKFACTORS:21939}        Cardiovascular:    RCRI:  Risk of Mace:      .dcv    functional capacity      EKG in PAT             Pulmonary:  {CPMPATPULM:69136}  {CPMPATPULMRISKFACTORS:07867}  Stop Bang score is *** placing patient at *** risk for SCOTT  ARISCAT: {ARISCAT Score:38722} risk of in-hospital postoperative pulmonary complication  PRODIGY: {Low/Medium/High:89451} risk for opioid induced respiratory depression  {CPMPATPULMEDUCATION:15893}        Renal/endo:  .ckd      GI/:      Heme:  Patient instructed to ambulate as soon as possible postoperatively to decrease thromboembolic risk.    Initiate mechanical DVT prophylaxis as soon as possible and initiate chemical prophylaxis when deemed safe from a bleeding standpoint post surgery.        Caprini=        Risk assessment complete.  Patient is scheduled for a LOW/INTERMEDIATE/HIGH surgical risk procedure.     IS/IS NOT considered medically optimized for the planned procedure.        Labs/testing obtained in PAT on 4/3/25: CBC, BMP, MRSA, EKG      Follow up/communication: none      Preoperative medication instructions were provided and reviewed with the patient.  Any additional testing or evaluation was explained to the patient.  Nothing by mouth instructions were discussed and patient's questions were answered prior to conclusion to this encounter.  Patient verbalized understanding of preoperative instructions given in preadmission testing; discharge instructions available in EMR.    This note was dictated with speech recognition.  Minor errors may have been detected during use of speech recognition.         prophylaxis when deemed safe from a bleeding standpoint post surgery.    Caprini= 8      Risk assessment complete.  Patient is scheduled for a INTERMEDIATE surgical risk procedure.     She IS considered medically optimized for the planned procedure.      Labs/testing obtained in [facility] on 4/3/25: CBC, BMP, MRSA, EKG    Lab Results   Component Value Date    WBC 8.5 04/03/2025    HGB 13.5 04/03/2025    HCT 40.6 04/03/2025    MCV 96 04/03/2025     04/03/2025     Lab Results   Component Value Date    GLUCOSE 93 04/03/2025    CALCIUM 9.5 04/03/2025     04/03/2025    K 4.1 04/03/2025    CO2 29 04/03/2025     04/03/2025    BUN 11 04/03/2025    CREATININE 0.73 04/03/2025       Follow up/communication: none      Preoperative medication instructions were provided and reviewed with the patient.  Any additional testing or evaluation was explained to the patient.  Nothing by mouth instructions were discussed and patient's questions were answered prior to conclusion to this encounter.  Patient verbalized understanding of preoperative instructions given in preadmission testing; discharge instructions available in EMR.    This note was dictated with speech recognition.  Minor errors may have been detected during use of speech recognition.

## 2025-04-03 NOTE — PREPROCEDURE INSTRUCTIONS
"   Medication List            Accurate as of April 3, 2025  3:01 PM. Always use your most recent med list.                * acetaminophen 500 mg tablet  Commonly known as: Tylenol  Medication Adjustments for Surgery: Take/Use as prescribed     * acetaminophen 500 mg tablet  Commonly known as: Tylenol Extra Strength  Take 2 tablets (1,000 mg) by mouth every 8 hours for 14 days.  Medication Adjustments for Surgery: Take/Use as prescribed  Notes to patient: Post op medication. To start after surgery per surgery team     albuterol 90 mcg/actuation inhaler  Inhale 2 puffs 4 times a day.  Medication Adjustments for Surgery: Take/Use as prescribed     BD Luer-Juliana Syringe 3 mL 25 gauge x 1\" syringe  Generic drug: syringe with needle  USE 1 SYRINGE TWICE WEEKLY FOR B12 INJECTIONS  Medication Adjustments for Surgery: Take/Use as prescribed     celecoxib 200 mg capsule  Commonly known as: CeleBREX  Take 1 capsule (200 mg) by mouth 2 times a day for 14 days.  Notes to patient: Post op medication. To start after surgery per surgery team     chlorhexidine 0.12 % solution  Commonly known as: Peridex  SWISH AND SPIT 15ML FOR 30 SECONDS NIGHT PRIOR TO SURGERY AND MORNING OF SURGERY  Medication Adjustments for Surgery: Take/Use as prescribed     cholecalciferol 5,000 Units tablet  Commonly known as: Vitamin D-3  Additional Medication Adjustments for Surgery: Take last dose 7 days before surgery     cyanocobalamin 1,000 mcg/mL injection  Commonly known as: Vitamin B-12  Inject 1 mL (1,000 mcg) into the muscle 2 times a week.  Additional Medication Adjustments for Surgery: Take last dose 7 days before surgery     doxycycline 100 mg capsule  Commonly known as: Vibramycin  Take 1 capsule (100 mg) by mouth 2 times a day for 7 days. Take with at least 8 ounces (large glass) of water, do not lie down for 30 minutes after  Medication Adjustments for Surgery: Take/Use as prescribed  Notes to patient: Post op medication. To start after surgery " "per surgery team     fexofenadine 180 mg tablet  Commonly known as: Allegra     gabapentin 300 mg capsule  Commonly known as: Neurontin  Take 1 capsule (300 mg) by mouth every 8 hours for 14 days.  Medication Adjustments for Surgery: Take/Use as prescribed  Notes to patient: Post op medication. To start after surgery per surgery team     insulin syringe-needle U-100 1/2 mL 30 gauge syringe  Medication Adjustments for Surgery: Take/Use as prescribed     insulin syringe-needle U-100 30G X 1/2\" 0.3 mL syringe  Use Once monthly for b 12 shot  Medication Adjustments for Surgery: Take/Use as prescribed     mometasone 50 mcg/actuation nasal spray  Commonly known as: Nasonex  Medication Adjustments for Surgery: Take/Use as prescribed     ParaGard T 380A 380 square mm IUD  Generic drug: copper  Medication Adjustments for Surgery: Take/Use as prescribed           * This list has 2 medication(s) that are the same as other medications prescribed for you. Read the directions carefully, and ask your doctor or other care provider to review them with you.                  Preoperative Deep Breathing Exercises  Why it is important to do deep breathing exercises before my surgery?  Deep breathing exercises strengthen your breathing muscles.  This helps you to recover after your surgery and decreases the chance of breathing complications.  How are the deep breathing exercises done?  Sit straight with your back supported.  Breathe in deeply and slowly through your nose. Your lower rib cage should expand and your abdomen may move forward.  Hold that breath for 3 to 5 seconds.  Breathe out through pursed lips, slowly and completely.  Rest and repeat 10 times every hour while awake.  Rest longer if you become dizzy or lightheaded.        CONTACT SURGEON'S OFFICE IF YOU DEVELOP:  * Fever = 100.4 F   * New respiratory symptoms (e.g. cough, shortness of breath, respiratory distress, sore throat)  * Recent loss of taste or smell  *Flu like " symptoms such as headache, fatigue or gastrointestinal symptoms  * You develop any open sores, shingles, burning or painful urination   AND/OR:  * You no longer wish to have the surgery.  * Any other personal circumstances change that may lead to the need to cancel or defer this surgery.  *You were admitted to any hospital within one week of your planned procedure.    SMOKING:  *Quitting smoking can make a huge difference to your health and recovery from surgery.    *If you need help with quitting, call 2-782-QUIT-NOW.    THE DAY OF SURGERY:  *Do not eat any food after midnight the night before your surgery.   *YOU MUST drink 14 OUNCES of clear liquids TWO hours before your instructed ARRIVAL TIME to the hospital. This includes water, black tea/coffee (no milk or cream), apple juice, clear broth and electrolyte drinks (Gatorade).  Please avoid clear liquids that are red in color.   *You may chew gum/mints up to TWO hours before your surgery/procedure.    SURGICAL TIME:  *You will be contacted between 2 p.m. and 6 p.m. the business day before your surgery with your arrival time.  *If you haven't received a call by 6pm, call [phone].  *Scheduled surgery times may change and you will be notified if this occurs-check your personal voicemail for any updates.    ON THE MORNING OF SURGERY:  *Wear comfortable, loose fitting clothing.   *Do not use moisturizers, creams, lotions or perfume.  *All jewelry and valuables should be left at home.  *Prosthetic devices such as contact lenses, hearing aids, dentures, eyelash extensions, hairpins and body piercing must be removed before surgery.    BRING WITH YOU:  *Photo ID and insurance card  *Current list of medications and allergies  *Pacemaker/Defibrillator/Heart stent cards  *CPAP machine and mask  *Slings/splints/crutches  *Copy of your complete Advanced Directive/DHPOA-if applicable  *Neurostimulator implant remote    PARKING AND ARRIVAL:  *Check in at the Main Entrance  desk and let them know you are here for surgery.  *You will be directed to the 2nd floor surgical waiting area.    IF YOU ARE HAVING OUTPATIENT/SAME DAY SURGERY:  *A responsible adult MUST accompany you at the time of discharge and stay with you for 24 hours after your surgery.  *You may NOT drive yourself home after surgery.  *You may use a taxi or ride sharing service ([employer], Uber) to return home ONLY if you are accompanied by a friend or family member.  *Instructions for resuming your medications will be provided by your surgeon.        Patient Information: Oral/Dental Rinse  **This is a prescription; pick it up at your preferred local pharmacy **  What is oral/dental rinse?   It is a mouthwash. It is a way of cleaning the mouth with a germ killing solution before your surgery.  The solution contains chlorhexidine, commonly known as CHG.   It is used inside the mouth to kill a bacteria known as Staphylococcus aureus.  Let your doctor know if you are allergic to Chlorhexidine.    Why do I need to use CHG oral/dental rinse?  The CHG oral/dental rinse helps to kill a bacteria in your mouth known a Staphylococcus aureus.     This reduces the risk of infection at the surgical site.      Using your CHG oral/dental rinse  STEPS:  Use your CHG oral/dental rinse after you brush your teeth the night before (at bedtime) and the morning of your surgery.  Follow all directions on your prescription label.    Use the cap on the container to measure 15ml (fill cap to fill line)  Swish (gargle if you can) the mouthwash in your mouth for at least 30 seconds, (do not to swallow) spit out  After you use your CHG rinse, do not rinse your mouth with water, drink or eat.  Please refer to prescription label for the appropriate time to resume oral intake  Dental rinse comes in one size bottle: 473ml ~16oz.  You will have leftover    rinse, discard after this use.    What side effects might I have using the CHG oral/dental rinse?  CHG  rinse will stick to plaque on the teeth.  Brush and floss just before use.  Teeth brushing will help avoid staining of plaque during use.    Who should I contact if I have questions about the CHG oral/dental rinse?  Please call [facility], Preadmission Testing at [phone] if you have any questions      Home Preoperative Antibacterial Shower     What is a home preoperative antibacterial shower?  This shower is a way of cleaning the skin with a germ killing soap before surgery.  The soap contains chlorhexidine, commonly known as CHG.  CHG is a soap for your skin with germ killing ability.  Let your doctor know if you are allergic to chlorhexidine.    Why do I need to take a preoperative antibacterial shower?  Skin is not sterile.  It is best to try to make your skin as free of germs as possible before surgery.  Proper cleansing with a germ killing soap before surgery can lower the number of germs on your skin.  This helps to reduce the risk of infection at the surgical site.  Following the instructions listed below will help you prepare your skin for surgery.      How do I use the CHG skin cleanser?  Steps:  Begin using your CHG soap five days before your scheduled surgery on ________________________.    Days 1-4 Shower before bed:  Wash your face and genitals with your normal soap and rinse.           2.    Apply the CHG soap to a clean wet washcloth.  Turn the water off or move away                From the water spray to avoid premature rinsing of the CHG soap as you are applying.     3.   Lather your entire body from the neck down.  Do not use on your face or genitals.  4. Pay special attention to the area(s) where your incision(s) will be located unless they are on your face.  Avoid scrubbing your skin too hard.  The important point is to have the CHG soap sit on your skin for 3 minutes.    When the 3 minutes are up, turn on the water and rinse the CHG soap off your body  completely.   Pat yourself dry with a clean, freshly-laundered towel.  Dress in clean, freshly laundered night clothes.    Be sure to change bed sheets and blankets at least on the first night of CHG body wash use. May change linens every night of the above protocol for maximum benefit.   Day 5:  Last shower is the morning of surgery: Follow above Instructions.    NOTE:        *Keep CHG soap out of eyes and ear canals   *DO NOT wash with regular soap on your body after you have used the CHG soap solution  *DO NOT apply powders, lotions, or perfume.  *Deodorant may be used days 1-4, BUT NOT the day of surgery    Who should I contact if I have any questions regarding the use of CHG soap?  Call the [facility], Preadmission Testing at [phone] if you have any questions.              Patient Information: Pre-Operative Infection Prevention Measures     Why did I have my nose, under my arms and groin swabbed?  The purpose of the swab is to identify Staphylococcus aureus inside your nose or on your skin.  The swab was sent to the laboratory for culture.  A positive swab/culture for Staphylococcus aureus is called colonization or carriage.      What is Staphylococcus aureus?  Staphylococcus aureus, also known as “staph”, is a germ found on the skin or in the nose of healthy people.  Sometimes Staphylococcus aureus can get into the body and cause an infection.  This can be minor (such as pimples, boils or other skin problems).  It might also be serious (such as blood infection, pneumonia or a surgical site infection).    What is Staphylococcus aureus colonization or carriage?  Colonization or carriage means that a person has the germ but is not sick from it.  These bacteria can be spread on the hands or when breathing or sneezing.    How is Staphylococcus aureus spread?  It is most often spread by close contact with a person or item that carries it.    What happens if my culture is positive  for Staphylococcus aureus?  Your doctor/medical team will use this information to guide any antibiotic treatment which may be necessary.  Regardless of the culture results, we will clean the inside of your nose with a betadine swab just before you have your surgery.      Will I get an infection if I have Staphylococcus aureus in my nose or on my skin?  Anyone can get an infection with Staphylococcus aureus.  However, the best way to reduce your risk of infection is to follow the instructions provided to you for the use of your CHG soap and dental rinse.        Who should I contact if I have any questions?  Call the [facility], Preadmission Testing at [phone] if you have any questions.

## 2025-04-04 ENCOUNTER — APPOINTMENT (OUTPATIENT)
Dept: SURGICAL ONCOLOGY | Facility: HOSPITAL | Age: 45
End: 2025-04-04
Payer: COMMERCIAL

## 2025-04-04 LAB
ATRIAL RATE: 62 BPM
P AXIS: -17 DEGREES
P OFFSET: 195 MS
P ONSET: 152 MS
PR INTERVAL: 132 MS
Q ONSET: 218 MS
QRS COUNT: 10 BEATS
QRS DURATION: 92 MS
QT INTERVAL: 388 MS
QTC CALCULATION(BAZETT): 393 MS
QTC FREDERICIA: 392 MS
R AXIS: 61 DEGREES
T AXIS: 63 DEGREES
T OFFSET: 412 MS
VENTRICULAR RATE: 62 BPM

## 2025-04-05 LAB — STAPHYLOCOCCUS SPEC CULT: NORMAL

## 2025-04-09 ENCOUNTER — ANESTHESIA (OUTPATIENT)
Dept: OPERATING ROOM | Facility: HOSPITAL | Age: 45
End: 2025-04-09
Payer: COMMERCIAL

## 2025-04-09 ENCOUNTER — HOSPITAL ENCOUNTER (OUTPATIENT)
Facility: HOSPITAL | Age: 45
Setting detail: OUTPATIENT SURGERY
Discharge: HOME | End: 2025-04-09
Attending: SURGERY | Admitting: SURGERY
Payer: COMMERCIAL

## 2025-04-09 ENCOUNTER — ANESTHESIA EVENT (OUTPATIENT)
Dept: OPERATING ROOM | Facility: HOSPITAL | Age: 45
End: 2025-04-09
Payer: COMMERCIAL

## 2025-04-09 VITALS
OXYGEN SATURATION: 97 % | HEIGHT: 68 IN | WEIGHT: 210.76 LBS | SYSTOLIC BLOOD PRESSURE: 110 MMHG | TEMPERATURE: 97.7 F | HEART RATE: 71 BPM | RESPIRATION RATE: 16 BRPM | DIASTOLIC BLOOD PRESSURE: 68 MMHG | BODY MASS INDEX: 31.94 KG/M2

## 2025-04-09 DIAGNOSIS — D05.11 DUCTAL CARCINOMA IN SITU (DCIS) OF RIGHT BREAST: ICD-10-CM

## 2025-04-09 LAB — PREGNANCY TEST URINE, POC: NEGATIVE

## 2025-04-09 PROCEDURE — A19357 PR BREAST RECONSTRUC W TISS EXPANDR: Performed by: ANESTHESIOLOGIST ASSISTANT

## 2025-04-09 PROCEDURE — 19357 TISS XPNDR PLMT BRST RCNSTJ: CPT | Performed by: SURGERY

## 2025-04-09 PROCEDURE — 19303 MAST SIMPLE COMPLETE: CPT

## 2025-04-09 PROCEDURE — 2780000003 HC OR 278 NO HCPCS: Performed by: SURGERY

## 2025-04-09 PROCEDURE — 3600000009 HC OR TIME - EACH INCREMENTAL 1 MINUTE - PROCEDURE LEVEL FOUR: Performed by: SURGERY

## 2025-04-09 PROCEDURE — 88305 TISSUE EXAM BY PATHOLOGIST: CPT | Mod: TC,AHULAB | Performed by: SURGERY

## 2025-04-09 PROCEDURE — 2500000005 HC RX 250 GENERAL PHARMACY W/O HCPCS: Performed by: SURGERY

## 2025-04-09 PROCEDURE — 2500000001 HC RX 250 WO HCPCS SELF ADMINISTERED DRUGS (ALT 637 FOR MEDICARE OP): Performed by: STUDENT IN AN ORGANIZED HEALTH CARE EDUCATION/TRAINING PROGRAM

## 2025-04-09 PROCEDURE — 14302 TIS TRNFR ADDL 30 SQ CM: CPT | Performed by: SURGERY

## 2025-04-09 PROCEDURE — 7100000001 HC RECOVERY ROOM TIME - INITIAL BASE CHARGE: Performed by: SURGERY

## 2025-04-09 PROCEDURE — 7100000010 HC PHASE TWO TIME - EACH INCREMENTAL 1 MINUTE: Performed by: SURGERY

## 2025-04-09 PROCEDURE — 3700000001 HC GENERAL ANESTHESIA TIME - INITIAL BASE CHARGE: Performed by: SURGERY

## 2025-04-09 PROCEDURE — 2500000002 HC RX 250 W HCPCS SELF ADMINISTERED DRUGS (ALT 637 FOR MEDICARE OP, ALT 636 FOR OP/ED): Performed by: SURGERY

## 2025-04-09 PROCEDURE — 2720000007 HC OR 272 NO HCPCS: Performed by: SURGERY

## 2025-04-09 PROCEDURE — 3600000004 HC OR TIME - INITIAL BASE CHARGE - PROCEDURE LEVEL FOUR: Performed by: SURGERY

## 2025-04-09 PROCEDURE — 2500000004 HC RX 250 GENERAL PHARMACY W/ HCPCS (ALT 636 FOR OP/ED): Performed by: SURGERY

## 2025-04-09 PROCEDURE — A19357 PR BREAST RECONSTRUC W TISS EXPANDR: Performed by: STUDENT IN AN ORGANIZED HEALTH CARE EDUCATION/TRAINING PROGRAM

## 2025-04-09 PROCEDURE — 7100000009 HC PHASE TWO TIME - INITIAL BASE CHARGE: Performed by: SURGERY

## 2025-04-09 PROCEDURE — 81025 URINE PREGNANCY TEST: CPT | Performed by: SURGERY

## 2025-04-09 PROCEDURE — 3700000002 HC GENERAL ANESTHESIA TIME - EACH INCREMENTAL 1 MINUTE: Performed by: SURGERY

## 2025-04-09 PROCEDURE — 88307 TISSUE EXAM BY PATHOLOGIST: CPT | Mod: TC,AHULAB | Performed by: SURGERY

## 2025-04-09 PROCEDURE — 2500000001 HC RX 250 WO HCPCS SELF ADMINISTERED DRUGS (ALT 637 FOR MEDICARE OP): Performed by: SURGERY

## 2025-04-09 PROCEDURE — C1789 PROSTHESIS, BREAST, IMP: HCPCS | Performed by: SURGERY

## 2025-04-09 PROCEDURE — 2500000005 HC RX 250 GENERAL PHARMACY W/O HCPCS: Performed by: ANESTHESIOLOGIST ASSISTANT

## 2025-04-09 PROCEDURE — C1889 IMPLANT/INSERT DEVICE, NOC: HCPCS | Performed by: SURGERY

## 2025-04-09 PROCEDURE — 19303 MAST SIMPLE COMPLETE: CPT | Performed by: SURGERY

## 2025-04-09 PROCEDURE — 88307 TISSUE EXAM BY PATHOLOGIST: CPT | Performed by: PATHOLOGY

## 2025-04-09 PROCEDURE — 7100000002 HC RECOVERY ROOM TIME - EACH INCREMENTAL 1 MINUTE: Performed by: SURGERY

## 2025-04-09 PROCEDURE — 88305 TISSUE EXAM BY PATHOLOGIST: CPT | Performed by: PATHOLOGY

## 2025-04-09 PROCEDURE — 2500000004 HC RX 250 GENERAL PHARMACY W/ HCPCS (ALT 636 FOR OP/ED): Performed by: ANESTHESIOLOGIST ASSISTANT

## 2025-04-09 PROCEDURE — 14301 TIS TRNFR ANY 30.1-60 SQ CM: CPT | Performed by: SURGERY

## 2025-04-09 DEVICE — IMPLANTABLE DEVICE: Type: IMPLANTABLE DEVICE | Site: BREAST | Status: FUNCTIONAL

## 2025-04-09 RX ORDER — GABAPENTIN 300 MG/1
600 CAPSULE ORAL ONCE
Status: COMPLETED | OUTPATIENT
Start: 2025-04-09 | End: 2025-04-09

## 2025-04-09 RX ORDER — DIPHENHYDRAMINE HYDROCHLORIDE 50 MG/ML
12.5 INJECTION, SOLUTION INTRAMUSCULAR; INTRAVENOUS ONCE AS NEEDED
Status: DISCONTINUED | OUTPATIENT
Start: 2025-04-09 | End: 2025-04-09 | Stop reason: HOSPADM

## 2025-04-09 RX ORDER — CEFAZOLIN 1 G/1
INJECTION, POWDER, FOR SOLUTION INTRAVENOUS AS NEEDED
Status: DISCONTINUED | OUTPATIENT
Start: 2025-04-09 | End: 2025-04-09

## 2025-04-09 RX ORDER — LIDOCAINE HYDROCHLORIDE 40 MG/ML
SOLUTION TOPICAL AS NEEDED
Status: DISCONTINUED | OUTPATIENT
Start: 2025-04-09 | End: 2025-04-09

## 2025-04-09 RX ORDER — ROCURONIUM BROMIDE 10 MG/ML
INJECTION, SOLUTION INTRAVENOUS AS NEEDED
Status: DISCONTINUED | OUTPATIENT
Start: 2025-04-09 | End: 2025-04-09

## 2025-04-09 RX ORDER — PROPOFOL 10 MG/ML
INJECTION, EMULSION INTRAVENOUS AS NEEDED
Status: DISCONTINUED | OUTPATIENT
Start: 2025-04-09 | End: 2025-04-09

## 2025-04-09 RX ORDER — CEFAZOLIN SODIUM 2 G/50ML
2 SOLUTION INTRAVENOUS ONCE
Status: DISCONTINUED | OUTPATIENT
Start: 2025-04-09 | End: 2025-04-09 | Stop reason: HOSPADM

## 2025-04-09 RX ORDER — SODIUM CHLORIDE, SODIUM LACTATE, POTASSIUM CHLORIDE, CALCIUM CHLORIDE 600; 310; 30; 20 MG/100ML; MG/100ML; MG/100ML; MG/100ML
100 INJECTION, SOLUTION INTRAVENOUS CONTINUOUS
Status: DISCONTINUED | OUTPATIENT
Start: 2025-04-09 | End: 2025-04-09 | Stop reason: HOSPADM

## 2025-04-09 RX ORDER — HYDROMORPHONE HYDROCHLORIDE 1 MG/ML
INJECTION, SOLUTION INTRAMUSCULAR; INTRAVENOUS; SUBCUTANEOUS AS NEEDED
Status: DISCONTINUED | OUTPATIENT
Start: 2025-04-09 | End: 2025-04-09

## 2025-04-09 RX ORDER — PHENYLEPHRINE HCL IN 0.9% NACL 1 MG/10 ML
SYRINGE (ML) INTRAVENOUS AS NEEDED
Status: DISCONTINUED | OUTPATIENT
Start: 2025-04-09 | End: 2025-04-09

## 2025-04-09 RX ORDER — SODIUM CHLORIDE 0.9 G/100ML
INJECTION, SOLUTION IRRIGATION AS NEEDED
Status: DISCONTINUED | OUTPATIENT
Start: 2025-04-09 | End: 2025-04-09 | Stop reason: HOSPADM

## 2025-04-09 RX ORDER — ONDANSETRON HYDROCHLORIDE 2 MG/ML
INJECTION, SOLUTION INTRAVENOUS AS NEEDED
Status: DISCONTINUED | OUTPATIENT
Start: 2025-04-09 | End: 2025-04-09

## 2025-04-09 RX ORDER — INDOCYANINE GREEN AND WATER 25 MG
KIT INJECTION AS NEEDED
Status: DISCONTINUED | OUTPATIENT
Start: 2025-04-09 | End: 2025-04-09

## 2025-04-09 RX ORDER — OXYCODONE HYDROCHLORIDE 5 MG/1
5 TABLET ORAL EVERY 4 HOURS PRN
Status: DISCONTINUED | OUTPATIENT
Start: 2025-04-09 | End: 2025-04-09 | Stop reason: HOSPADM

## 2025-04-09 RX ORDER — ACETAMINOPHEN 325 MG/1
975 TABLET ORAL ONCE
Status: COMPLETED | OUTPATIENT
Start: 2025-04-09 | End: 2025-04-09

## 2025-04-09 RX ORDER — ONDANSETRON HYDROCHLORIDE 2 MG/ML
4 INJECTION, SOLUTION INTRAVENOUS ONCE AS NEEDED
Status: DISCONTINUED | OUTPATIENT
Start: 2025-04-09 | End: 2025-04-09 | Stop reason: HOSPADM

## 2025-04-09 RX ORDER — FENTANYL CITRATE 50 UG/ML
INJECTION, SOLUTION INTRAMUSCULAR; INTRAVENOUS AS NEEDED
Status: DISCONTINUED | OUTPATIENT
Start: 2025-04-09 | End: 2025-04-09

## 2025-04-09 RX ORDER — LIDOCAINE HYDROCHLORIDE 10 MG/ML
0.1 INJECTION, SOLUTION EPIDURAL; INFILTRATION; INTRACAUDAL; PERINEURAL ONCE
Status: DISCONTINUED | OUTPATIENT
Start: 2025-04-09 | End: 2025-04-09 | Stop reason: HOSPADM

## 2025-04-09 RX ORDER — LABETALOL HYDROCHLORIDE 5 MG/ML
5 INJECTION, SOLUTION INTRAVENOUS ONCE AS NEEDED
Status: DISCONTINUED | OUTPATIENT
Start: 2025-04-09 | End: 2025-04-09 | Stop reason: HOSPADM

## 2025-04-09 RX ORDER — SODIUM CHLORIDE 9 MG/ML
INJECTION, SOLUTION INTRAVENOUS CONTINUOUS PRN
Status: DISCONTINUED | OUTPATIENT
Start: 2025-04-09 | End: 2025-04-09

## 2025-04-09 RX ORDER — KETOROLAC TROMETHAMINE 30 MG/ML
INJECTION, SOLUTION INTRAMUSCULAR; INTRAVENOUS AS NEEDED
Status: DISCONTINUED | OUTPATIENT
Start: 2025-04-09 | End: 2025-04-09

## 2025-04-09 RX ORDER — APREPITANT 40 MG/1
40 CAPSULE ORAL ONCE
Status: COMPLETED | OUTPATIENT
Start: 2025-04-09 | End: 2025-04-09

## 2025-04-09 RX ORDER — MIDAZOLAM HYDROCHLORIDE 1 MG/ML
INJECTION INTRAMUSCULAR; INTRAVENOUS AS NEEDED
Status: DISCONTINUED | OUTPATIENT
Start: 2025-04-09 | End: 2025-04-09

## 2025-04-09 RX ADMIN — ONDANSETRON 4 MG: 2 INJECTION, SOLUTION INTRAMUSCULAR; INTRAVENOUS at 13:17

## 2025-04-09 RX ADMIN — APREPITANT 40 MG: 40 CAPSULE ORAL at 08:59

## 2025-04-09 RX ADMIN — ROCURONIUM BROMIDE 20 MG: 10 INJECTION INTRAVENOUS at 10:39

## 2025-04-09 RX ADMIN — ROCURONIUM BROMIDE 20 MG: 10 INJECTION INTRAVENOUS at 11:01

## 2025-04-09 RX ADMIN — Medication 2.5 MG: at 12:02

## 2025-04-09 RX ADMIN — LIDOCAINE HYDROCHLORIDE 4 ML: 40 SOLUTION TOPICAL at 10:23

## 2025-04-09 RX ADMIN — DEXAMETHASONE SODIUM PHOSPHATE 8 MG: 4 INJECTION, SOLUTION INTRAMUSCULAR; INTRAVENOUS at 10:30

## 2025-04-09 RX ADMIN — GABAPENTIN 600 MG: 300 CAPSULE ORAL at 08:59

## 2025-04-09 RX ADMIN — ACETAMINOPHEN 975 MG: 325 TABLET, FILM COATED ORAL at 08:59

## 2025-04-09 RX ADMIN — FENTANYL CITRATE 50 MCG: 50 INJECTION, SOLUTION INTRAMUSCULAR; INTRAVENOUS at 13:17

## 2025-04-09 RX ADMIN — FENTANYL CITRATE 50 MCG: 50 INJECTION, SOLUTION INTRAMUSCULAR; INTRAVENOUS at 13:12

## 2025-04-09 RX ADMIN — ROCURONIUM BROMIDE 50 MG: 10 INJECTION INTRAVENOUS at 10:20

## 2025-04-09 RX ADMIN — SODIUM CHLORIDE, POTASSIUM CHLORIDE, SODIUM LACTATE AND CALCIUM CHLORIDE: 600; 310; 30; 20 INJECTION, SOLUTION INTRAVENOUS at 10:04

## 2025-04-09 RX ADMIN — CEFAZOLIN 2 G: 330 INJECTION, POWDER, FOR SOLUTION INTRAMUSCULAR; INTRAVENOUS at 10:22

## 2025-04-09 RX ADMIN — PROPOFOL 170 MG: 10 INJECTION, EMULSION INTRAVENOUS at 10:20

## 2025-04-09 RX ADMIN — Medication 100 MCG: at 12:00

## 2025-04-09 RX ADMIN — FENTANYL CITRATE 50 MCG: 50 INJECTION, SOLUTION INTRAMUSCULAR; INTRAVENOUS at 10:30

## 2025-04-09 RX ADMIN — MIDAZOLAM HYDROCHLORIDE 2 MG: 1 INJECTION, SOLUTION INTRAMUSCULAR; INTRAVENOUS at 10:12

## 2025-04-09 RX ADMIN — ROCURONIUM BROMIDE 20 MG: 10 INJECTION INTRAVENOUS at 12:20

## 2025-04-09 RX ADMIN — KETOROLAC TROMETHAMINE 30 MG: 30 INJECTION, SOLUTION INTRAMUSCULAR at 13:25

## 2025-04-09 RX ADMIN — SUGAMMADEX 200 MG: 100 INJECTION, SOLUTION INTRAVENOUS at 13:24

## 2025-04-09 RX ADMIN — FENTANYL CITRATE 50 MCG: 50 INJECTION, SOLUTION INTRAMUSCULAR; INTRAVENOUS at 13:00

## 2025-04-09 RX ADMIN — OXYCODONE HYDROCHLORIDE 5 MG: 5 TABLET ORAL at 14:01

## 2025-04-09 RX ADMIN — ROCURONIUM BROMIDE 20 MG: 10 INJECTION INTRAVENOUS at 11:33

## 2025-04-09 RX ADMIN — HYDROMORPHONE HYDROCHLORIDE 1 MG: 1 INJECTION, SOLUTION INTRAMUSCULAR; INTRAVENOUS; SUBCUTANEOUS at 13:26

## 2025-04-09 RX ADMIN — Medication 7.5 MG: at 12:34

## 2025-04-09 RX ADMIN — ROCURONIUM BROMIDE 20 MG: 10 INJECTION INTRAVENOUS at 11:57

## 2025-04-09 ASSESSMENT — PAIN - FUNCTIONAL ASSESSMENT
PAIN_FUNCTIONAL_ASSESSMENT: 0-10

## 2025-04-09 ASSESSMENT — PAIN SCALES - GENERAL
PAINLEVEL_OUTOF10: 2
PAINLEVEL_OUTOF10: 0 - NO PAIN
PAINLEVEL_OUTOF10: 2
PAINLEVEL_OUTOF10: 2
PAINLEVEL_OUTOF10: 0 - NO PAIN
PAINLEVEL_OUTOF10: 2
PAINLEVEL_OUTOF10: 2
PAINLEVEL_OUTOF10: 0 - NO PAIN

## 2025-04-09 ASSESSMENT — COLUMBIA-SUICIDE SEVERITY RATING SCALE - C-SSRS
1. IN THE PAST MONTH, HAVE YOU WISHED YOU WERE DEAD OR WISHED YOU COULD GO TO SLEEP AND NOT WAKE UP?: NO
6. HAVE YOU EVER DONE ANYTHING, STARTED TO DO ANYTHING, OR PREPARED TO DO ANYTHING TO END YOUR LIFE?: NO
2. HAVE YOU ACTUALLY HAD ANY THOUGHTS OF KILLING YOURSELF?: NO

## 2025-04-09 NOTE — DISCHARGE INSTRUCTIONS
Plastic Surgery Post Discharge Instructions  Activity:  Avoiding strenuous activity is critical to your recovery during the immediate post operative period. No pushing, pulling or lifting objects greater than 5 pounds with the right arm. Avoid raising the right arm above the level of the shoulder. No cooking, cleaning or driving until cleared by Dr. Lira. Try to get up and ambulate short distances in your house once an hour every hour throughout the day to reduce the risk of postoperative blood clots. Avoid walking further distances until cleared at your post operative visit.     Do not shower until your dressings are removed at your first postoperative appointment. You may sponge bath locally.    Surgical Site/Wound Care:  Do not remove your surgical dressings or surgical bra. They will be removed at your first postoperative appointment.     Once your dressings are removed, avoid application of creams, lotions or ointments to surgical site, no soaking or scrubbing of surgical sites.     Drain care:  You are being discharged with 2 drains. To empty the drain, open the cap, tip into cup and squeeze to empty. Squeeze drain flat then replace the cap.  Please empty the drain and record its output 3 times a day and bring these numbers to your follow up appointment.  The drain output should decrease and the color of the drainage should become lighter (red to pink to yellow).  This drain is sutured into place.  Keep the dressing around the drain clean and intact. Avoid letting the drain hang to gravity. Call the office if you notice drastic changes in drain output, bloody drain output, or redness/drainage around insertion site.    Nutrition:  You may resume a regular diet following surgery. Ensure that you are drinking an adequate amount of fluids to maintain hydration.     It is essential to ensure you are eating adequate protein to promote wound healing. We recommend 120g of protein daily while you are recovering.      Medication Instructions:  You may resume use of your home prescribed medications as previously directed following discharge from the hospital. If you were taking medications prior to your surgery and they are not listed on your discharge homegoing instructions medications list, consult your MD before you resume these medications.    Please take your prescribed medications as instructed during your pre-operative visit. If you have any questions regarding your post op medications, please contact our nurses (Jessica Gray [phone], Option 6).     Remember when taking Acetaminophen, do NOT exceed more than 1000 milligrams (mg) per dose or more than 4000 mg total per day. Taking too much Acetaminophen at one time can damage your liver. Call your MD if you have any questions about your medications. To prevent constipation while taking narcotic pain medications, please utilize your prescribed bowel regimen, ensure that you drink plenty of water, eat fiber rich foods (a good source is fruit) and increase activity progressively.    Call Physician If:  Contact the plastic surgery office for any questions and/or concerns regarding the surgical incision/site.  1. [phone], Option 6 if Monday-Friday (8 a.m. - 4:30 p.m.)  2. [phone] and ask for the Plastic Surgery team on call provider if after hours or on weekends    Call your MD or seek immediate medical attention if you experience any of the following symptoms:  1. Fever of 101.5 (38.5 C) or greater  2. Pain not controlled with prescribed pain medications  3. Uncontrolled nausea and/or vomiting  4. Drainage or swelling around your incisions and/or surgical sites   5. Separation of incisions, or tearing of the incision line  6. Large fluid collection under or around the incision or flap sites   7. Flap discoloration (including darkened appearance)  8. Difficulty breathing  9. Swelling, pain, heat and/or redness in your legs and/or calves  10. Inability  to tolerate diet/fluid intake    Follow-up/Post Discharge Appointments:  Follow-up care is a key part of your treatment and safety. It is very important that you maintain follow-up care as directed so that your surgical site heals properly and does not lead to problems. Always carry a current medication list with you and bring it to ALL healthcare Provider visits. Be sure to maintain follow up with plastic surgery at your scheduled appointment. If you are unable to keep your appointment, or need to reschedule please contact our office at [phone].

## 2025-04-09 NOTE — OP NOTE
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] Post-op Diagnosis     * Ductal carcinoma in situ (DCIS) of right breast [D05.11]     Procedures  Right Breast Mastectomy  20453 - [place] MASTECTOMY SIMPLE COMPLETE    Right Breast Tissue Expander;  05980 - [place] TISSUE EXPANDER PLACEMENT BREAST RECONSTRUCTION    Right Breast Adjacent Tissue Rearrangement  37344 - [place] ADJNT TIS TRNSFR/REARGMT ANY AREA 30.1-60 SQ CM      Surgeons   Panel 1:     * Quynh Maguire - Primary  Panel 2:     * Barbara Lira - Primary    Resident/Fellow/Other Assistant:  Surgeons and Role:  Panel 1:     * Barbara Lira MD - Assisting     * CRISTIAN Villaseñor-C - YENIFER First Assist    Staff:   Circulator: Mikki Pitt Person: Nehal  Surgical Assistant: Cheng Zeng Circulator: Noemi Zeng Scrub: Taylor Zeng Scrub: Jessica    Anesthesia Staff: Anesthesiologist: Alli Tarango MD; Matthew Medina MD  C-AA: NANDA Cruz; NANDA España    Procedure Summary  Anesthesia: General  ASA: III  Estimated Blood Loss: 10 mL (my part)  Intra-op Medications:   Administrations occurring from 0935 to 1435 on 25:   Medication Name Total Dose   sodium chloride 0.9 % irrigation solution 1,000 mL   BUPivacaine-EPINEPHrine (Marcaine w/EPI) 54 mL in sodium chloride 0.9% 60 mL syringe 114 mL   ceFAZolin (Ancef) vial 1 g 2 g   dexAMETHasone (Decadron) injection 4 mg/mL 8 mg   fentaNYL (Sublimaze) injection 50 mcg/mL 50 mcg   indocyanine green (IC-Green) injection 25 mg 2.5 mg   LR bolus Cannot be calculated   lidocaine (LTA Kit) for intubation 4 mL   midazolam PF (Versed) injection 1 mg/mL 2 mg   phenylephrine 100 mcg/mL syringe 10 mL (prefilled) 100 mcg   propofol (Diprivan) injection 10 mg/mL 170 mg   rocuronium (ZeMuron)  50 mg/5 mL injection 150 mg              Anesthesia Record               Intraprocedure I/O Totals       None           Specimen:   ID Type Source Tests Collected by Time   1 : RIGHT BREAST SHORT SUPERIOR LONG LATERAL Tissue BREAST MASTECTOMY RIGHT SURGICAL PATHOLOGY EXAM Quynh Maguire MD 4/9/2025 1152   2 : RIGHT BREAST SKIN INFERIOR MASTECTOMY Tissue BREAST MASTECTOMY RIGHT SURGICAL PATHOLOGY EXAM Barbara Lira MD 4/9/2025 1052                 Drains and/or Catheters: * None in log *    Tourniquet Times:         Implants:  Implants       Type Name Action Serial No.       KIM MARTINES PLUS SMOOTH ULTRA HIGH PROFILE Implanted N/A              Findings: no gross disease    Indications: Tori Taylor is an 44 y.o. female who is having surgery for Ductal carcinoma in situ (DCIS) of right breast [D05.11].     The patient was seen in the preoperative area. The risks, benefits, complications, treatment options, non-operative alternatives, expected recovery and outcomes were discussed with the patient. The possibilities of reaction to medication, pulmonary aspiration, injury to surrounding structures, bleeding, recurrent infection, the need for additional procedures, failure to diagnose a condition, and creating a complication requiring transfusion or operation were discussed with the patient. The patient concurred with the proposed plan, giving informed consent.  The site of surgery was properly noted/marked if necessary per policy. The patient has been actively warmed in preoperative area. Preoperative antibiotics have been ordered and given within 1 hours of incision. Venous thrombosis prophylaxis have been ordered including bilateral sequential compression devices    Procedure Details:   Informed consent was obtained. The surgical site marked and the Day of Surgery Update completed. The patient was taken to the operating room and positioned in a supine position on the operating room table. Anesthesia was induced  without difficulty. SCDs were placed for DVT prophylaxis. Antibiotics were administered within 60 minutes prior to incision for surgical prophylaxis. Lower body Cassi Hugger was applied to help maintain normothermia.    I reviewed my note and the appropriate films.  A surgical safety time-out was performed.    The patient was sterilely prepped and draped in the usual fashion.       Dr. Lira started by performing de-epithelization of the inferior flap.  Upon completion of this, I turned my attention to the right breast.  I made an incision containing the nipple areolar complex and medial and lateral extensions.  I used the bovie.  I then dissected superiorly to the clavicle, inferiorly to the inframammary crease, medially to the medial border of the sternum and laterally to the latissimus dorsi muscle.  I then carried my dissection down all way down to the pectoralis major muscle taking pectoralis fascia with the specimen.  The specimen was then marked with a short stitch superiorly and a long stitch laterally. The specimen was passed off the field    I then irrigated and obtained meticulous hemostasis.  Dr. Lira then came in to complete the immediate reconstruction.      Complications:  None; patient tolerated the procedure well.    Disposition:  care transferred to Dr. Lira intraoperatively   Condition: stable         Task Performed by YENIFER First Assist or Physician Assistant:   Carey FOSTER/NP, was necessary to assist on this case due to the nature of the case and difficulty. During the case Carey served as my assist by providing surgical exposure      Additional Details: Skin sparing technique was used and the entire skin envelope with the exception of the nipple areolar complex was preserved increasing the level of difficulty.     Attending Attestation: A qualified resident physician was not available.    Quynh Maguire  Phone Number: [phone]

## 2025-04-09 NOTE — OP NOTE
Right Breast Immediate Reconstruction with Tissue Expander and adjacent tissue rearrangement (R) Operative Note     Date: 2025  OR Location: [facility] OR    Name: Tori Taylor, : 1980, Age: 44 y.o., MRN: [id number], Sex: female    Diagnosis  Pre-op Diagnosis      * Ductal carcinoma in situ (DCIS) of right breast [D05.11] Post-op Diagnosis     * Ductal carcinoma in situ (DCIS) of right breast [D05.11]     Procedures  Right Breast Tissue Expander;  65519 - RI TISSUE EXPANDER PLACEMENT BREAST RECONSTRUCTION    Right Breast Adjacent Tissue Rearrangement 24 cm x 16.5 cm = 412.5 cm2  86370 - RI ADJNT TIS TRNSFR/REARGMT ANY AREA 30.1-60 SQ CM  14302 x 12    Surgeons      * Barbara Lira - Primary  Resident/Fellow/Other Assistant:  Surgeons and Role:  Panel 1:     * Barbara Lira MD - Assisting     * CRISTIAN Villaseñor-WILBERT - YENIFER First Assist    There was no skilled surgical resident assistance available.  The Surgical Assistant was necessary to assist due to the nature of the case and difficulty.  Specifically, the YENIFER was assisted with soft tissue handling, retraction, hemostasis and closure in order to successfully perform and complete the procedure.    Staff:   Circulator: Mikki Pitt Person: Nehal  Surgical Assistant: Cheng Zeng Circulator: Noemi Zeng Scrub: Taylor Zeng Scrub: Jessica    Anesthesia Staff: Anesthesiologist: Alli Tarango MD; Matthew Medina MD  C-AA: NANDA Cruz; NANDA España    Procedure Summary  Anesthesia: General  ASA: III  Estimated Blood Loss: 50 mL  Intra-op Medications:   Administrations occurring from 0935 to 1435 on 25:   Medication Name Total Dose   sodium chloride 0.9 % irrigation solution 1,000 mL   BUPivacaine-EPINEPHrine (Marcaine w/EPI) 54 mL in sodium chloride 0.9% 60 mL syringe 114 mL   oxyCODONE (Roxicodone) immediate release tablet 5 mg 5 mg   ceFAZolin (Ancef) vial 1 g 2 g   dexAMETHasone (Decadron) injection 4 mg/mL 8 mg    fentaNYL (Sublimaze) injection 50 mcg/mL 200 mcg   HYDROmorphone (Dilaudid) injection 1 mg/mL 1 mg   indocyanine green (IC-Green) injection 25 mg 10 mg   ketorolac (Toradol) injection 30 mg 30 mg   LR bolus Cannot be calculated   lidocaine (LTA Kit) for intubation 4 mL   midazolam PF (Versed) injection 1 mg/mL 2 mg   ondansetron (Zofran) 2 mg/mL injection 4 mg   phenylephrine 100 mcg/mL syringe 10 mL (prefilled) 100 mcg   propofol (Diprivan) injection 10 mg/mL 170 mg   rocuronium (ZeMuron) 50 mg/5 mL injection 150 mg   sugammadex (Bridion) 200 mg/2 mL injection 200 mg              Anesthesia Record               Intraprocedure I/O Totals          Intake    LR bolus 1300.00 mL    Total Intake 1300 mL       Output    Est. Blood Loss 50 mL    Total Output 50 mL       Net    Net Volume 1250 mL          Specimen:   ID Type Source Tests Collected by Time   1 : RIGHT BREAST SHORT SUPERIOR LONG LATERAL Tissue BREAST MASTECTOMY RIGHT SURGICAL PATHOLOGY EXAM Quynh Maguire MD 4/9/2025 1152   2 : RIGHT BREAST SKIN INFERIOR MASTECTOMY Tissue SKIN WIDE EXCISION SURGICAL PATHOLOGY EXAM Barbara Lira MD 4/9/2025 1052   3 : RIGHT MASTECTOMY INCISION Tissue SKIN WIDE EXCISION SURGICAL PATHOLOGY EXAM Barbara Lira MD 4/9/2025 1237                 Drains and/or Catheters:   Closed/Suction Drain 1 Right Breast Bulb 19 Fr. (Active)   Dressing Status Dry;Clean 04/09/25 1435   Drainage Appearance Clear;Serosanguineous 04/09/25 1435   Status To bulb suction 04/09/25 1434       Closed/Suction Drain 2 Right Breast Bulb 19 Fr. (Active)   Dressing Status Clean;Dry 04/09/25 1435   Drainage Appearance Clear;Serosanguineous 04/09/25 1435   Status To bulb suction 04/09/25 1434       Tourniquet Times:         Implants:  Implants       Type Name Action Serial No.       MENTOR ARTOURA PLUS SMOOTH ULTRA HIGH PROFILE 350 cc Implanted N/A             Indications: Tori Taylor is an 44 y.o. female who is having surgery for Ductal carcinoma in situ  (DCIS) of right breast [D05.11].  Dr. Quynh Maguire will be performing a right skin sparing mastectomy and the patient will then be getting an alloplastic reconstruction with a combination of prepectoral tissue expander and adjacent tissue rearrangement.    The patient was seen in the preoperative area. The risks, benefits, complications, treatment options, non-operative alternatives, expected recovery and outcomes were discussed with the patient.  These include but are not limited to the risks of anesthesia, infection, bleeding, pain, need for further procedures, hematoma, seroma, wound healing complications, and asymmetry.  The possibilities of reaction to medication, pulmonary aspiration, injury to surrounding structures, bleeding, recurrent infection, the need for additional procedures, failure to diagnose a condition, and creating a complication requiring transfusion or operation were discussed with the patient. The patient concurred with the proposed plan, giving informed consent.  The site of surgery was properly noted/marked if necessary per policy. The patient has been actively warmed in preoperative area. Preoperative antibiotics have been ordered and given within 1 hours of incision. Venous thrombosis prophylaxis have been ordered including bilateral sequential compression devices       Procedure Details: The patient was identified and marked in the upright and standing position.  She was taken to the operative room and placed in the supine position.  A preoperative time was performed identifying the patient, procedeure and laterality.  An atraumatic endotracheal intubation was performed by the anesthesia team.  Her arms were padded and carefully secured to the arm boards, abducted less than 90 degrees. She was prepped and draped in the usual sterile fashion.     I marked and sharply de-epithelialized the inferior flap of the right breast using a 10 blade.  This measured 25 cm x 11 cm.  I then turned the case  over to Dr. Quynh Maguire who performed a right skin-sparing simple mastectomy.  Please see her separate dictated operative note for details of her portion of the procedure.  Called back again once the specimen was out.  The specimen weighed 627 g.  However her anterior chest wall was 10 cm.  The right was also larger than the left breast.  Therefore I decided to use a 350 cc ultrahigh untorsed smooth tissue expander that had a 10 cm diameter.      I obtained hemostasis and irrigated the right breast.  I then assessed the perfusion of the skin flaps with indocyanine green and the SPY.  There was some delayed perfusion along the inferior lateral aspect of the superior mastectomy skin flap.  Therefore I decided to go ahead with tissue expander reconstruction but not inflated with air.  I placed the tissue expander, deflated of all air, on her chest wall.  I sutures all the tabs to the chest wall using 2-0 nylon.      I raised the inferior pole dermal adipose flap medially along the inframammary fold approximately 4 cm creating a 4 cm  x 4 cm flap.  I included in this the medial subcutaneous tissue to decrease dogear formation there and recruit this volume centrally.  I then raised a lateral flap off the lateral inframammary fold by approximately 14 cm.  The lateral chest wall subcutaneous fat was included with this.  Lateral chest wall vessels remained intact and vascularized to this fat pad.  The dissection was carried down to the posterior half of the serratus muscle, including the serratus fascia to help perfuse it as well as the circumflex serratus pedicle.  This adipose fascial and dermal flap measured 14 cm x 11 cm x 12 cm.  Next, I advanced the fat pad in the superior lateral mastectomy skin flap anteriorly and inferiorly by approximately 45 degrees and secured to the chest wall and smoothed out axillary fullness.  This was secured with multiple interrupted 2-0 Vicryl's.  The superior mastectomy flap was also  advanced inferiorly 3 cm and secured to the chest wall with multiple interrupted Vicryl's to decreased shear and dead space.    I advanced the medial flap centrally and slightly superiorly and secured it to the chest wall using 2-0 Vicryl's to drape the inferior medial tissue expander,  I then used multiple 2-0 Vicryl's to secure the lateral adipose fascial flap to the anterior chest wall and to advance the flap superiorly and medially to cover and drape the lateral pole of the tissue expander.  The central portion of the flap was draped superior to cover the inferior portion of the tissue expander.  The superior mastectomy skin flap was advanced inferiorly and the superior edge of the inferior flap was marked on the superior mastectomy skin flap.  Interrupted 2-0 Vicryl's were then used to secure the superior edge of the inferior flap to the parenchyma of the superior mastectomy skin flap and areas where the flap was the thickest.  This created a secure pocket of her own tissue to secure and wrap over her tissue expander.      Next the superior mastectomy skin flap was once more advanced inferiorly to remove any redundancy.  The laxity was all advanced centrally and incisions were tailor tacked together.  A small amount of central skin redundancy was imbricated in the central inferior pole as a vertical ellipse.  I Once more the perfusion of the mastectomy flaps were assessed with indocyanine green and the SPY onto the adequate once the central portion was adjusted and loosened.  The final area of central skin that was marked and de-epithelialized measured 4 x 2 cm.       I placed 2 19 Kazakh Richie Drains.  54 cc of 0.5% Marcaine with epinephrine was diluted with 60 cc of normal saline and this was all used to place a chest wall block on the right side including the pectoralis intercostals field block and drain sites.The incisions were closed with 3-0 Monocryl interrupted buried dermals and a running 4-0 Monocryl.   The total area of rearranged tissue measured 24 cm x 16 cm.      Due to the patient's allergy to Tegaderm, a Prevena Kiki incisional breast dressing was not used.  Instead the incisions were dressed with 1 inch Steri-Strips.  The drains were dressed with Biopatch as covered with DuoDERM.  DuoDERM was placed over the operative site to help keep the mastectomy flaps opposed to the tissue expander construct.  She was then placed in a surgical bra.  The patient tolerated the procedure well she was awoken extubated and taken to the recovery room in good condition.    Complications:  None; patient tolerated the procedure well.    Disposition: PACU - hemodynamically stable.  Condition: stable     Attending Attestation: I performed the procedure.    Barbara Lira MD  Phone Number: [phone]

## 2025-04-09 NOTE — ANESTHESIA POSTPROCEDURE EVALUATION
Patient: Tori Taylor    Procedure Summary       Date: 04/09/25 Room / Location: [facility] OR 09 / [facility] OR    Anesthesia Start: 1010 Anesthesia Stop: 1343    Procedures:       Right Breast Mastectomy (Right: Breast)      Right Breast Tissue Expander; (Right: Breast)      Right Breast Adjacent Tissue Rearrangement (Right: Breast) Diagnosis:       Ductal carcinoma in situ (DCIS) of right breast      (Ductal carcinoma in situ (DCIS) of right breast [D05.11])    Surgeons: Quynh Maguire MD; Barbara Lira MD Responsible Provider: Matthew Medina MD    Anesthesia Type: general ASA Status: 3            Anesthesia Type: general    Vitals Value Taken Time   /69 04/09/25 1400   Temp 36.5 °C (97.7 °F) 04/09/25 1339   Pulse 70 04/09/25 1400   Resp 16 04/09/25 1400   SpO2 94 % 04/09/25 1400       Anesthesia Post Evaluation    Patient location during evaluation: PACU  Patient participation: complete - patient participated  Level of consciousness: awake  Pain management: adequate  Airway patency: patent  Cardiovascular status: acceptable  Respiratory status: acceptable  Hydration status: acceptable  Postoperative Nausea and Vomiting: none    No notable events documented.

## 2025-04-09 NOTE — ANESTHESIA PROCEDURE NOTES
Airway  Date/Time: 4/9/2025 10:24 AM  Urgency: elective      Staffing  Performed: NANDA   Authorized by: Alli Tarango MD    Performed by: NANDA España  Patient location during procedure: OR    Indications and Patient Condition  Indications for airway management: anesthesia  Spontaneous Ventilation: absent  Sedation level: deep  Preoxygenated: yes  Mask difficulty assessment: 1 - vent by mask    Final Airway Details  Final airway type: endotracheal airway      Successful airway: ETT  Cuffed: yes   Successful intubation technique: direct laryngoscopy  Blade: Reji  Blade size: #3  ETT size (mm): 7.5  Cormack-Lehane Classification: grade I - full view of glottis  Placement verified by: chest auscultation   Number of attempts at approach: 1    Additional Comments  ETT tube tied due to adhesive allergy concern

## 2025-04-09 NOTE — ANESTHESIA PREPROCEDURE EVALUATION
Patient: Tori Taylor    Procedure Information       Date/Time: 04/09/25 0935    Procedures:       Right Breast Mastectomy (Right: Breast)      Right Breast Tissue Expander; (Right: Breast)      Right Breast Adjacent Tissue Rearrangement (Right: Breast)    Location: [facility] OR 09 / [facility] OR    Surgeons: Quynh Maguire MD; Barbara Lira MD          History Comments   Seizures (Multi) 9/2020 focal seizure.  MRI more  suggesting of immunotherapy flare rather than progression of disease (missed last 2 doses due to symptoms). Completed Keppra therapy. TESTING NOT INDICATIVE OF ANY SEIZURE ACTIVITY   Obesity    Ductal carcinoma in situ (DCIS) of right breast Plan: Right Breast Mastectomy 4/9/2025   Vitamin B12 deficiency    Acquired hypothyroidism    Melanoma in situ of the skin (Multi) melanoma diagnosed in 2014 s/p local excision of right forearm with partial thickness flap and axillary LND s/p 10 months  of adjuvant interferon alpha therapy, stopped due to thyroiditis.   Metastasis to brain (Multi) Solitary brain metastasis left parietal lobe; s/p left-sided craniotomy for hemorrhagic tumor resection and duraplasty with pericranial graft by Dr. Ruth Veliz  on 6/26/2020   Pulmonary nodule 4 mm solitary RUL; 7/2023: CT C/A/P: Stable pulmonary nodule   H/O echocardiogram CONCLUSIONS:  1. The left ventricular systolic function is normal with a 55% estimated ejection fraction.  2. There is mitral stenosis is not seen.  3. Aortic valve stenosis is not present.  4. The main pulmonary artery is normal in size, and position, with normal bifurcation into the left and right pulmonary arteries.   Anxiety    Lymphedema    Allergy-induced asthma (HHS-HCC)    Vitamin D deficiency        Relevant Problems   Pulmonary   (+) Asthma with acute exacerbation (HHS-HCC)   (+) Mild asthma (HHS-HCC)      Neuro   (+) Anxiety      Liver   (+) Hepatomegaly, not elsewhere classified   (+) Liver disease      Endocrine   (+)  Hypothyroidism   (+) Nontoxic single thyroid nodule      Hematology   (+) Anemia      GYN   (+) Leiomyoma of uterus       Clinical information reviewed:                   NPO Detail:  No data recorded     Physical Exam    Airway  Mallampati: II  TM distance: >3 FB  Neck ROM: full     Cardiovascular   Rhythm: regular  Rate: normal     Dental    Pulmonary   Breath sounds clear to auscultation     Abdominal            Anesthesia Plan    History of general anesthesia?: yes  History of complications of general anesthesia?: no    ASA 3     general     intravenous induction   Anesthetic plan and risks discussed with patient.    Plan discussed with CRNA.

## 2025-04-09 NOTE — BRIEF OP NOTE
Date: 2025  OR Location: [facility] OR    Name: Tori Taylor, : 1980, Age: 44 y.o., MRN: [id number], Sex: female    Diagnosis  Pre-op Diagnosis      * Ductal carcinoma in situ (DCIS) of right breast [D05.11] Post-op Diagnosis     * Ductal carcinoma in situ (DCIS) of right breast [D05.11]     Procedures  Right Breast Mastectomy  45971 - AK MASTECTOMY SIMPLE COMPLETE    Right Breast Tissue Expander;  43503 - AK TISSUE EXPANDER PLACEMENT BREAST RECONSTRUCTION    Right Breast Adjacent Tissue Rearrangement  01182 - AK ADJNT TIS TRNSFR/REARGMT ANY AREA 30.1-60 SQ CM      Surgeons   Panel 1:     * Quynh Maguire - Primary  Panel 2:     * Barbara Lira - Primary    Resident/Fellow/Other Assistant:  Surgeons and Role:  Panel 1:     * Barbara Lira MD - Assisting     * DARIEL VillaseñorC - YENIFER First Assist    Staff:   Circulator: Mikki Crooksub Person: Nehal  Surgical Assistant: Cheng Zeng Circulator: Noemi Zeng Scrub: Taylor Zeng Scrub: Jessica    Anesthesia Staff: Anesthesiologist: Alli Tarango MD; Matthew Medina MD  C-AA: NANDA Cruz; NANDA España    Procedure Summary  Anesthesia: General  ASA: III  Estimated Blood Loss: 10 mL  Intra-op Medications:   Administrations occurring from 0935 to 1435 on 25:   Medication Name Total Dose   sodium chloride 0.9 % irrigation solution 1,000 mL   BUPivacaine-EPINEPHrine (Marcaine w/EPI) 54 mL in sodium chloride 0.9% 60 mL syringe 114 mL   ceFAZolin (Ancef) vial 1 g 2 g   dexAMETHasone (Decadron) injection 4 mg/mL 8 mg   fentaNYL (Sublimaze) injection 50 mcg/mL 200 mcg   HYDROmorphone (Dilaudid) injection 1 mg/mL 1 mg   indocyanine green (IC-Green) injection 25 mg 10 mg   ketorolac (Toradol) injection 30 mg 30 mg   LR bolus Cannot be calculated   lidocaine (LTA Kit) for intubation 4 mL   midazolam PF (Versed) injection 1 mg/mL 2 mg   ondansetron (Zofran) 2 mg/mL injection 4 mg   phenylephrine 100 mcg/mL syringe 10 mL  (prefilled) 100 mcg   propofol (Diprivan) injection 10 mg/mL 170 mg   rocuronium (ZeMuron) 50 mg/5 mL injection 150 mg   sugammadex (Bridion) 200 mg/2 mL injection 200 mg              Anesthesia Record               Intraprocedure I/O Totals          Intake    LR bolus 1300.00 mL    Total Intake 1300 mL       Output    Est. Blood Loss 50 mL    Total Output 50 mL       Net    Net Volume 1250 mL          Specimen:   ID Type Source Tests Collected by Time   1 : RIGHT BREAST SHORT SUPERIOR LONG LATERAL Tissue BREAST MASTECTOMY RIGHT SURGICAL PATHOLOGY EXAM Quynh Maguire MD 4/9/2025 1152   2 : RIGHT BREAST SKIN INFERIOR MASTECTOMY Tissue SKIN WIDE EXCISION SURGICAL PATHOLOGY EXAM Barbara Lira MD 4/9/2025 1052   3 : RIGHT MASTECTOMY INCISION Tissue SKIN WIDE EXCISION SURGICAL PATHOLOGY EXAM Barbara Lira MD 4/9/2025 1237          Findings: Well perfused right mastectomy flap    Complications:  None; patient tolerated the procedure well.     Disposition: PACU - hemodynamically stable.  Condition: stable  Specimens Collected:   ID Type Source Tests Collected by Time   1 : RIGHT BREAST SHORT SUPERIOR LONG LATERAL Tissue BREAST MASTECTOMY RIGHT SURGICAL PATHOLOGY EXAM Quynh Maguire MD 4/9/2025 1152   2 : RIGHT BREAST SKIN INFERIOR MASTECTOMY Tissue SKIN WIDE EXCISION SURGICAL PATHOLOGY EXAM Barbara Lira MD 4/9/2025 1052   3 : RIGHT MASTECTOMY INCISION Tissue SKIN WIDE EXCISION SURGICAL PATHOLOGY EXAM Barbara Lira MD 4/9/2025 1237     Attending Attestation:  Dr. Lira was present and scrubbed in for the entire procedure.

## 2025-04-11 ENCOUNTER — TELEPHONE (OUTPATIENT)
Dept: PLASTIC SURGERY | Facility: CLINIC | Age: 45
End: 2025-04-11
Payer: COMMERCIAL

## 2025-04-11 NOTE — TELEPHONE ENCOUNTER
Outgoing postoperative follow up call. She is doing well overall. Pain is well controlled, HORACE drain output down trending appropriately. She has our office and after hours numbers and will call with any questions or concerns.

## 2025-04-14 NOTE — PROGRESS NOTES
"       PLASTIC SURGERY CLINIC VISIT  POSTOP BREAST RECONSTRUCTION     Date: 4/17/25  Date of Surgery: 4/9/25  Surgical Procedure: Right Mastectomy with Tissue Expander        HPI:   Torirosa BootheTaylor 44 y.o. female is here for post-operative appointment for the above procedure(s).      Interval changes as of this date:   4/17 Doing well overall. Pain is well controlled. Endorses adequate protein intake and activity restrictions. Surgical dressings in place. HORACE drains in place with SS output.     MEDICATIONS    Current Outpatient Medications:     acetaminophen (Tylenol Extra Strength) 500 mg tablet, Take 2 tablets (1,000 mg) by mouth every 8 hours for 14 days., Disp: 84 tablet, Rfl: 0    acetaminophen (Tylenol) 500 mg tablet, Take 1 tablet (500 mg) by mouth., Disp: , Rfl:     albuterol 90 mcg/actuation inhaler, Inhale 2 puffs 4 times a day., Disp: 18 g, Rfl: 3    BD Luer-Juliana Syringe 3 mL 25 gauge x 1\" syringe, USE 1 SYRINGE TWICE WEEKLY FOR B12 INJECTIONS, Disp: 8 each, Rfl: 11    celecoxib (CeleBREX) 200 mg capsule, Take 1 capsule (200 mg) by mouth 2 times a day for 14 days., Disp: 28 capsule, Rfl: 0    cholecalciferol (Vitamin D-3) 5,000 Units tablet, Take 1 tablet (5,000 Units) by mouth once daily., Disp: , Rfl:     copper (ParaGard T 380A) 380 square mm IUD, by intrauterine route., Disp: , Rfl:     cyanocobalamin (Vitamin B-12) 1,000 mcg/mL injection, Inject 1 mL (1,000 mcg) into the muscle 2 times a week., Disp: 10 mL, Rfl: 0    fexofenadine (Allegra) 180 mg tablet, Take 1 tablet (180 mg) by mouth once daily as needed., Disp: , Rfl:     gabapentin (Neurontin) 300 mg capsule, Take 1 capsule (300 mg) by mouth every 8 hours for 14 days., Disp: 42 capsule, Rfl: 0    insulin syringe-needle U-100 1/2 mL 30 gauge syringe, , Disp: , Rfl:     insulin syringe-needle U-100 30G X 1/2\" 0.3 mL syringe, Use Once monthly for b 12 shot, Disp: 15 each, Rfl: 1    mometasone (Nasonex) 50 mcg/actuation nasal spray, Administer 1 " spray into affected nostril(s) once daily., Disp: , Rfl:       OBJECTIVE [x]Expand by Default  There were no vitals taken for this visit.     REVIEW OF SYSTEMS:    Constitutional: negative for fevers, chills, unintentional weight loss  HEENT: negative for changes in vision, headaches, changes in hearing, congestion, sore throat  Cardiovascular: negative for chest pain, palpitations  Respiratory: negative for cough, shortness of breath  Gastrointestinal: negative for nausea, vomiting, diarrhea  Genitourinary: negative for dysuria, hematuria  Musculoskeletal: negative for joint swelling or pain  Skin: negative for rashes or lesions  Psych: negative for depression, anxiety  Endocrine: negative for polyuria, polydipsia, cold/heat intolerance  Hem/Lymph: negative for bleeding disorder     PHYSICAL EXAM  General: alert and oriented, no apparent distress    Focused exam of the breasts:  Right: Incisions C/D/I, healing well. Mastectomy flaps viable and well perfused. Jpx2 with SS drainage.    MENTOR [employer] PLUS SMOOTH ULTRA HIGH PROFILE 350 cc         ASSESSMENT/PLAN  Tori Taylor 44 y.o. female who had Right Mastectomy with TE on 4/9/25 who presents for POV.    Doing well post operatively, pain well controlled. Endorses adequate protein intake. Following activity restrictions.    Incisions healing well. Mastectomy flaps viable and well perfused.     HORACE drain(s) in place. No erythema or edema surrounding the drain site. There is serous output from the drain. Patient recorded output of the drains showed 2 consecutive days of less than 30cc output at time of removal. Patient was educated on purpose of surgical drains and informed of risk for seroma post drain removal.       HORACE drain(s) removed at this visit: 1     Continue activity restrictions  Continue increased protein intake  OK to shower  Steri strips applied, instructed to dry with hair dryer on cool setting   Refilled antibiotic until remaining HORACE is removed       RTC in 1 week for drain removal    Neeta Fonseca PA-C

## 2025-04-16 ENCOUNTER — APPOINTMENT (OUTPATIENT)
Dept: RADIOLOGY | Facility: HOSPITAL | Age: 45
End: 2025-04-16
Payer: COMMERCIAL

## 2025-04-17 ENCOUNTER — APPOINTMENT (OUTPATIENT)
Dept: PLASTIC SURGERY | Facility: CLINIC | Age: 45
End: 2025-04-17
Payer: COMMERCIAL

## 2025-04-17 VITALS — HEIGHT: 68 IN | WEIGHT: 210 LBS | BODY MASS INDEX: 31.83 KG/M2

## 2025-04-17 DIAGNOSIS — D05.11 DUCTAL CARCINOMA IN SITU (DCIS) OF RIGHT BREAST: ICD-10-CM

## 2025-04-17 RX ORDER — DOXYCYCLINE 100 MG/1
100 CAPSULE ORAL 2 TIMES DAILY
Qty: 14 CAPSULE | Refills: 0 | Status: SHIPPED | OUTPATIENT
Start: 2025-04-17 | End: 2025-04-24

## 2025-04-18 ENCOUNTER — APPOINTMENT (OUTPATIENT)
Dept: HEMATOLOGY/ONCOLOGY | Facility: HOSPITAL | Age: 45
End: 2025-04-18
Payer: COMMERCIAL

## 2025-04-18 NOTE — PROGRESS NOTES
"       PLASTIC SURGERY CLINIC VISIT  POSTOP BREAST RECONSTRUCTION     Date: 4/22/25  Date of Surgery: 4/9/25  Surgical Procedure: Right Mastectomy with Tissue Expander        HPI:   Tori R Taylor 44 y.o. female is here for post-operative appointment for the above procedure(s).      Interval changes as of this date:   4/17 Doing well overall. Pain is well controlled. Endorses adequate protein intake and activity restrictions. Surgical dressings in place. HORACE drains in place with SS output.   4/22 Here today for drain removal evaluation. HORACE drain w SS output< 15 ml per day for 3 consecutive days    MEDICATIONS    Current Outpatient Medications:     acetaminophen (Tylenol) 500 mg tablet, Take 1 tablet (500 mg) by mouth., Disp: , Rfl:     albuterol 90 mcg/actuation inhaler, Inhale 2 puffs 4 times a day., Disp: 18 g, Rfl: 3    BD Luer-Juliana Syringe 3 mL 25 gauge x 1\" syringe, USE 1 SYRINGE TWICE WEEKLY FOR B12 INJECTIONS, Disp: 8 each, Rfl: 11    cholecalciferol (Vitamin D-3) 5,000 Units tablet, Take 1 tablet (5,000 Units) by mouth once daily., Disp: , Rfl:     copper (ParaGard T 380A) 380 square mm IUD, by intrauterine route., Disp: , Rfl:     cyanocobalamin (Vitamin B-12) 1,000 mcg/mL injection, Inject 1 mL (1,000 mcg) into the muscle 2 times a week., Disp: 10 mL, Rfl: 0    doxycycline (Vibramycin) 100 mg capsule, Take 1 capsule (100 mg) by mouth 2 times a day for 7 days. Take with at least 8 ounces (large glass) of water, do not lie down for 30 minutes after, Disp: 14 capsule, Rfl: 0    fexofenadine (Allegra) 180 mg tablet, Take 1 tablet (180 mg) by mouth once daily as needed., Disp: , Rfl:     gabapentin (Neurontin) 300 mg capsule, Take 1 capsule (300 mg) by mouth every 8 hours for 14 days., Disp: 42 capsule, Rfl: 0    insulin syringe-needle U-100 1/2 mL 30 gauge syringe, , Disp: , Rfl:     insulin syringe-needle U-100 30G X 1/2\" 0.3 mL syringe, Use Once monthly for b 12 shot, Disp: 15 each, Rfl: 1    mometasone " (Nasonex) 50 mcg/actuation nasal spray, Administer 1 spray into affected nostril(s) once daily., Disp: , Rfl:       OBJECTIVE [x]Expand by Default  There were no vitals taken for this visit.     REVIEW OF SYSTEMS:    Constitutional: negative for fevers, chills, unintentional weight loss  HEENT: negative for changes in vision, headaches, changes in hearing, congestion, sore throat  Cardiovascular: negative for chest pain, palpitations  Respiratory: negative for cough, shortness of breath  Gastrointestinal: negative for nausea, vomiting, diarrhea  Genitourinary: negative for dysuria, hematuria  Musculoskeletal: negative for joint swelling or pain  Skin: negative for rashes or lesions  Psych: negative for depression, anxiety  Endocrine: negative for polyuria, polydipsia, cold/heat intolerance  Hem/Lymph: negative for bleeding disorder     PHYSICAL EXAM  General: alert and oriented, no apparent distress    Focused exam of the breasts:  Right/: Incisions C/D/I, healing well. Mastectomy flaps viable and well perfused. Jpx2 with SS drainage.    MENTOR ARTOURA PLUS SMOOTH ULTRA HIGH PROFILE 350 cc         ASSESSMENT/PLAN  Tori Taylor 44 y.o. female who had Right Mastectomy with TE on 4/9/25 who presents for POV.    Doing well post operatively, pain well controlled. Endorses adequate protein intake. Following activity restrictions.    Incisions healing well. Mastectomy flaps viable and well perfused.     HORACE drain(s) in place. No erythema or edema surrounding the drain site. There is serous output from the drain. Patient recorded output of the drains showed 2 consecutive days of less than 30cc output at time of removal. Patient was educated on purpose of surgical drains and informed of risk for seroma post drain removal.       HORACE drain(s) removed at this visit: 1     Continue activity restrictions  Continue increased protein intake  Bacitracin, gauze and paper tape applied to drain site      RTC 4/29/25    Marina  Colin, RN

## 2025-04-22 ENCOUNTER — OFFICE VISIT (OUTPATIENT)
Dept: PLASTIC SURGERY | Facility: CLINIC | Age: 45
End: 2025-04-22
Payer: COMMERCIAL

## 2025-04-22 ENCOUNTER — APPOINTMENT (OUTPATIENT)
Dept: PLASTIC SURGERY | Facility: CLINIC | Age: 45
End: 2025-04-22
Payer: COMMERCIAL

## 2025-04-22 VITALS — HEIGHT: 68 IN | BODY MASS INDEX: 31.83 KG/M2 | WEIGHT: 210 LBS

## 2025-04-22 DIAGNOSIS — D05.11 DUCTAL CARCINOMA IN SITU (DCIS) OF RIGHT BREAST: ICD-10-CM

## 2025-04-22 PROCEDURE — 3008F BODY MASS INDEX DOCD: CPT | Performed by: SURGERY

## 2025-04-22 PROCEDURE — 99024 POSTOP FOLLOW-UP VISIT: CPT | Performed by: SURGERY

## 2025-04-23 NOTE — PROGRESS NOTES
"       PLASTIC SURGERY CLINIC VISIT  POSTOP BREAST RECONSTRUCTION     Date: 4/29/25  Date of Surgery: 4/9/25  Surgical Procedure: Right Mastectomy with Tissue Expander     HPI:   Tori Taylor 44 y.o. female is here for post-operative appointment for the above procedure(s).      Interval changes as of this date:   4/17 Doing well overall. Pain is well controlled. Endorses adequate protein intake and activity restrictions. Surgical dressings in place. HORACE drains in place with SS output.   4/22 Here today for drain removal evaluation. HORACE drain w SS output< 15 ml per day for 3 consecutive days  4/29 Doing well today. She reports feeling generally well with mild soreness and tenderness at the surgical site, but no significant pain. Notes increased fullness since drain removal. She denies fever, drainage, or systemic symptoms. She is concerned about an upcoming whole-body MRI related to her prior cancer diagnosis and asks whether the presence of the tissue expander will interfere with imaging.    MEDICATIONS    Current Outpatient Medications:     acetaminophen (Tylenol) 500 mg tablet, Take 1 tablet (500 mg) by mouth., Disp: , Rfl:     albuterol 90 mcg/actuation inhaler, Inhale 2 puffs 4 times a day., Disp: 18 g, Rfl: 3    BD Luer-Juliana Syringe 3 mL 25 gauge x 1\" syringe, USE 1 SYRINGE TWICE WEEKLY FOR B12 INJECTIONS, Disp: 8 each, Rfl: 11    cholecalciferol (Vitamin D-3) 5,000 Units tablet, Take 1 tablet (5,000 Units) by mouth once daily., Disp: , Rfl:     copper (ParaGard T 380A) 380 square mm IUD, by intrauterine route., Disp: , Rfl:     cyanocobalamin (Vitamin B-12) 1,000 mcg/mL injection, Inject 1 mL (1,000 mcg) into the muscle 2 times a week., Disp: 10 mL, Rfl: 0    doxycycline (Vibramycin) 100 mg capsule, Take 1 capsule (100 mg) by mouth 2 times a day for 7 days. Take with at least 8 ounces (large glass) of water, do not lie down for 30 minutes after, Disp: 14 capsule, Rfl: 0    fexofenadine (Allegra) 180 mg " "tablet, Take 1 tablet (180 mg) by mouth once daily as needed., Disp: , Rfl:     gabapentin (Neurontin) 300 mg capsule, Take 1 capsule (300 mg) by mouth every 8 hours for 14 days., Disp: 42 capsule, Rfl: 0    insulin syringe-needle U-100 1/2 mL 30 gauge syringe, , Disp: , Rfl:     insulin syringe-needle U-100 30G X 1/2\" 0.3 mL syringe, Use Once monthly for b 12 shot, Disp: 15 each, Rfl: 1    mometasone (Nasonex) 50 mcg/actuation nasal spray, Administer 1 spray into affected nostril(s) once daily., Disp: , Rfl:       OBJECTIVE [x]Expand by Default  Blood pressure 104/71, pulse 67, temperature 36.9 °C (98.4 °F), temperature source Temporal, weight 95.7 kg (210 lb 15.7 oz), SpO2 99%.      REVIEW OF SYSTEMS:    Constitutional: negative for fevers, chills, unintentional weight loss  HEENT: negative for changes in vision, headaches, changes in hearing, congestion, sore throat  Cardiovascular: negative for chest pain, palpitations  Respiratory: negative for cough, shortness of breath  Gastrointestinal: negative for nausea, vomiting, diarrhea  Genitourinary: negative for dysuria, hematuria  Musculoskeletal: negative for joint swelling or pain  Skin: negative for rashes or lesions  Psych: negative for depression, anxiety  Endocrine: negative for polyuria, polydipsia, cold/heat intolerance  Hem/Lymph: negative for bleeding disorder     PHYSICAL EXAM  General: alert and oriented, no apparent distress    Focused exam of the breasts:  Right/: Incisions C/D/I, healing well. Mastectomy flaps viable and well perfused. +fluid wave    MENTOR ARTOURA PLUS SMOOTH ULTRA HIGH PROFILE 350 cc    4/29 Expanded by 100 ml (Total 100) Aspirated 55 ml seroma     ASSESSMENT/PLAN  Tori Taylor 44 y.o. female who had Right Mastectomy with TE on 4/9/25 who presents for POV.    Doing well post operatively, pain well controlled. Endorses adequate protein intake. Following activity restrictions.    Incisions healing well. Mastectomy flaps viable " and well perfused.     Continue activity restrictions  Continue increased protein intake   MRI Precaution: Patient instructed to notify the imaging facility about the presence of a tissue expander with an integrated magnetic port. Radiology will determine MRI compatibility based on equipment specifications.    Patient Questions Addressed: All concerns discussed, including activity restrictions and MRI safety.     RTC: next week    Scribe Attestation  By signing my name below, Shannan LEROY , Scribe   attest that this documentation has been prepared under the direction and in the presence of Barbara Lira MD.    Attending Attestation:  Barbara LEROY MD, personal performed the history, exam, and decision making on this patient.

## 2025-04-24 DIAGNOSIS — C50.412 MALIGNANT NEOPLASM OF UPPER-OUTER QUADRANT OF LEFT BREAST IN FEMALE, ESTROGEN RECEPTOR NEGATIVE: ICD-10-CM

## 2025-04-24 DIAGNOSIS — G89.18 POST-OP PAIN: ICD-10-CM

## 2025-04-24 DIAGNOSIS — Z17.1 MALIGNANT NEOPLASM OF UPPER-OUTER QUADRANT OF LEFT BREAST IN FEMALE, ESTROGEN RECEPTOR NEGATIVE: ICD-10-CM

## 2025-04-24 LAB
LAB AP ASR DISCLAIMER: NORMAL
LAB AP ASR DISCLAIMER: NORMAL
LAB AP BLOCK FOR ADDITIONAL STUDIES: NORMAL
LAB AP BLOCK FOR ADDITIONAL STUDIES: NORMAL
LABORATORY COMMENT REPORT: NORMAL
LABORATORY COMMENT REPORT: NORMAL
PATH REPORT.FINAL DX SPEC: NORMAL
PATH REPORT.FINAL DX SPEC: NORMAL
PATH REPORT.GROSS SPEC: NORMAL
PATH REPORT.GROSS SPEC: NORMAL
PATH REPORT.RELEVANT HX SPEC: NORMAL
PATH REPORT.RELEVANT HX SPEC: NORMAL
PATH REPORT.TOTAL CANCER: NORMAL
PATH REPORT.TOTAL CANCER: NORMAL
PATHOLOGY SYNOPTIC REPORT: NORMAL
PATHOLOGY SYNOPTIC REPORT: NORMAL

## 2025-04-24 PROCEDURE — 88360 TUMOR IMMUNOHISTOCHEM/MANUAL: CPT | Performed by: PATHOLOGY

## 2025-04-24 PROCEDURE — 88341 IMHCHEM/IMCYTCHM EA ADD ANTB: CPT | Performed by: PATHOLOGY

## 2025-04-24 PROCEDURE — 88342 IMHCHEM/IMCYTCHM 1ST ANTB: CPT | Performed by: PATHOLOGY

## 2025-04-24 RX ORDER — GABAPENTIN 300 MG/1
300 CAPSULE ORAL EVERY 8 HOURS
Qty: 42 CAPSULE | Refills: 0 | Status: SHIPPED | OUTPATIENT
Start: 2025-04-24 | End: 2025-05-08

## 2025-04-25 ENCOUNTER — OFFICE VISIT (OUTPATIENT)
Dept: SURGICAL ONCOLOGY | Facility: HOSPITAL | Age: 45
End: 2025-04-25
Payer: COMMERCIAL

## 2025-04-25 VITALS
BODY MASS INDEX: 32.54 KG/M2 | SYSTOLIC BLOOD PRESSURE: 108 MMHG | TEMPERATURE: 97 F | DIASTOLIC BLOOD PRESSURE: 75 MMHG | RESPIRATION RATE: 16 BRPM | WEIGHT: 214 LBS | OXYGEN SATURATION: 100 % | HEART RATE: 66 BPM

## 2025-04-25 DIAGNOSIS — Z85.3 PERSONAL HISTORY OF BREAST CANCER: ICD-10-CM

## 2025-04-25 DIAGNOSIS — Z17.0 MALIGNANT NEOPLASM OF OVERLAPPING SITES OF RIGHT BREAST IN FEMALE, ESTROGEN RECEPTOR POSITIVE: Primary | ICD-10-CM

## 2025-04-25 DIAGNOSIS — D05.11 DUCTAL CARCINOMA IN SITU (DCIS) OF RIGHT BREAST: ICD-10-CM

## 2025-04-25 DIAGNOSIS — C50.811 MALIGNANT NEOPLASM OF OVERLAPPING SITES OF RIGHT BREAST IN FEMALE, ESTROGEN RECEPTOR POSITIVE: Primary | ICD-10-CM

## 2025-04-25 PROCEDURE — 99211 OFF/OP EST MAY X REQ PHY/QHP: CPT | Performed by: SURGERY

## 2025-04-25 NOTE — PROGRESS NOTES
Chief Complaint  Post op    History of Present Illness    PCP Fab    44 year-old   female here for post op      History:  1) No abnormal mammograms, breast biopsies, or breast surgeries.  2) 12/5/2024.  Bilateral screening mammogram.  Left breast negative.  Right breast calcifications BI-RADS 0  3) 12/10/2024.  Right breast diagnostic imaging.  Extensive indeterminate right breast calcifications are confirmed.  Predominantly centered in the lower outer quadrant from the subareolar region to posterior depth.  They extend anteriorly into the lower inner quadrant.  They span over 10 cm.  BI-RADS 4.  2 site biopsy is recommended  4) met with Dr. JO Norton. Advised pre bx MRI  1-20 25.  Bilateral breast MRI.  Left breast negative.  Right breast extensive area of non-mass enhancement centered in the lower outer quadrant.  Non-mass enhancement extends at the subareolar region, far posterior depth.  Enhancement begins at 9:00 and extends to 6:00.  This spans 11 x 7.1 x 4.3 cm.  They correlate to the extent of microcalcifications on mammogram.  There is no enhancement of the chest wall or pectoralis muscle.  No skin enhancement is appreciated though non-mass enhancement is superficial.  Right axilla is not included in the field-of-view but there is no lymphadenopathy.  BI-RADS 4.  2 site stereotactic biopsy  5) January 9, 2025.  Right breast stereotactic biopsy (done at [facility]. Only 1 site) .  Intermediate to high-grade DCIS, ER % clip placed  6) January 22, 2025.  Right axillary ultrasound.  No sonographic findings.  No lymph nodes are seen  7) communicated with Dr. Welch and Dr. Musa who suggest proceeding with treatment for her DCIS  8) negative genetic testing  9) 4/9/2025 right mastectomy 3 mm grade 2 IDC with extensive DCIS (8.4 cm) margins neg pT1a pNx      She presents today for further management and surgical discussion.  Stopped HRT at diagnosis.     2014  personal history of right upper  extremity melanoma status post wide excision and sentinel lymph node biopsy and axillary lymph node dissection.  Adjuvant interferon 3 years.    2020  brain metastasis status post excision, radiation and immunotherapy.      Ob/gyn history:  Menarche 11  Menopause perimenopausal  G 3 P 2, age of first delivery 23  + OCPs, no fertility treatments, recent HRT    No family history of breast or ovarian cancer.     Review of systems  A comprehensive ROS was taken on the patient intake form and reviewed with the patient. This form is scanned into the electronic medical record.    Constitutional symptoms: Denies generalized fatigue. Denies weight change, fevers/chills, difficulty sleeping   Eyes: Denies double vision, glaucoma, cataracts.  Ear/nose/throat/mouth: Denies hearing changes, sore throat, sinus problems.  Cardiovascular: No chest pain. Denies irregular heartbeat. Denies ankle swelling.  Respiratory: No wheezing, cough, or shortness of breath.  Gastrointestinal: No abdominal pain, No nausea/vomiting. No indigestion/heartburn. No change in bowel habits. No constipation or diarrhea.   Genitourinary: No urinary incontinence. No urinary frequency. No painful urination.  Musculoskeletal: No bone pain, no muscle pain, no joint pain.   Integumentary: No rash. No masses. No changes in moles. No easy bruising.  Neurological: No headaches. No tremors. No numbness/tingling.  Psychiatric: No anxiety. No depression.  Endocrine: No excessive thirst. Not too hot or too cold. Not tired or fatigued.    Hematological/lymphatic: No swollen glands or blood clotting problems. No bruising.     Physical exam  A chaperone was offered for all portions of the physical exam. The patient declined.     General appearance: appears stated age, alert and oriented x 3  Head: Normocephalic, atraumatic  Eyes: conjunctivae/corneas clear.  Ears: External ears are normal, hearing is grossly intact.  Lungs: normal breathing  Heart: regular   Abdomen:  Soft, nontender, nondistended.  Neurologic: grossly normal  Lymph nodes: No cervical, supraclavicular or axillary lymphadenopathy bilaterally    Breast: A comprehensive breast exam was performed in the seated and supine positions. Right breast surgically absent There are no skin changes on arm maneuvers. Bra size: 36C   Right: mastectomy flaps pink and healthy    Left: no masses    Results  Imaging  All breast imaging personally reviewed by me.  See HPI    Pathology  January 9, 2025.  Right breast stereotactic biopsy (done at [facility]. Only 1 site) .  Intermediate to high-grade DCIS, ER % clip placed    Impression:   1) 44 year-old   female here with new right breast DCIS , ER positive   Calcs span over 10 cm  Now s/p right TM pT1a pNx margins neg  2) Co-morbidities include history of metastatic melanoma. Non-smoker  3) No family history of breast or ovarian cancer. Neg genetic testing      Plan:   She is here with her family.  She is recovering well from surgery.  She continues her FU with Mily lira  We reviewed her pathology report and she was given a copy.  A small focus of invasive cancer was noted amongst 8 cm of DCIS. Margins neg. Her surgical treatment is complete.  We reviewed the importance of adjuvant therapies.  She has been referred to medical oncology .  There is no routine imaging after mastectomy. Left mammogram is due 12/2025.  She can see me after her mammogram in December. If she plans on left breast surgery with Dr. Lira sooner, she should let us know so her mammogram can be done prior to her surgery. She should call with any sooner concerns.

## 2025-04-25 NOTE — Clinical Note
She may need her mammogram sooner than December if she is having surgery with Dr. Lira on the left side.

## 2025-04-25 NOTE — PATIENT INSTRUCTIONS
Follow up with Dr. Maguire in December with left mammogram    Dr Maguire has referred you to medical oncology.

## 2025-04-28 DIAGNOSIS — Z17.0 MALIGNANT NEOPLASM OF OVERLAPPING SITES OF RIGHT BREAST IN FEMALE, ESTROGEN RECEPTOR POSITIVE: Primary | ICD-10-CM

## 2025-04-28 DIAGNOSIS — C50.811 MALIGNANT NEOPLASM OF OVERLAPPING SITES OF RIGHT BREAST IN FEMALE, ESTROGEN RECEPTOR POSITIVE: Primary | ICD-10-CM

## 2025-04-29 ENCOUNTER — OFFICE VISIT (OUTPATIENT)
Dept: PLASTIC SURGERY | Facility: CLINIC | Age: 45
End: 2025-04-29
Payer: COMMERCIAL

## 2025-04-29 VITALS
OXYGEN SATURATION: 99 % | BODY MASS INDEX: 32.08 KG/M2 | HEART RATE: 67 BPM | TEMPERATURE: 98.4 F | DIASTOLIC BLOOD PRESSURE: 71 MMHG | SYSTOLIC BLOOD PRESSURE: 104 MMHG | WEIGHT: 210.98 LBS

## 2025-04-29 DIAGNOSIS — D05.11 DUCTAL CARCINOMA IN SITU (DCIS) OF RIGHT BREAST: Primary | ICD-10-CM

## 2025-04-29 PROCEDURE — 99211 OFF/OP EST MAY X REQ PHY/QHP: CPT | Performed by: SURGERY

## 2025-04-29 ASSESSMENT — PAIN SCALES - GENERAL: PAINLEVEL_OUTOF10: 0-NO PAIN

## 2025-05-01 ENCOUNTER — TUMOR BOARD CONFERENCE (OUTPATIENT)
Dept: HEMATOLOGY/ONCOLOGY | Facility: HOSPITAL | Age: 45
End: 2025-05-01
Payer: COMMERCIAL

## 2025-05-01 NOTE — TUMOR BOARD NOTE
MULTIDISCIPLINARY BREAST CANCER TUMOR BOARD CONFERENCE NOTE  Tori Taylor was presented at Breast Cancer Tumor Board Conference  Conference date: 5/1/2025  Presenting Provider(s): Dr. Quynh Maguire   Present at Conference: Medical Oncology, Radiation Oncology, Surgical Oncology, Radiology, and Pathology Representatives  Conference Review Type: Pathology Review    National Guidelines discussed: Yes    Surgical Resection: Surgery is complete. Do not recommend axillary surgery.  Radiation therapy: not indicated  Genomic Testing: no    Systemic therapy: Consider endocrine therapy  Clinical Trial Eligible: no    Genetics: meets NCCN criteria, negative    Referral Recommendations:  Medical Oncology    Cancer Staging:  Cancer Staging   Malignant melanoma of arm (Multi)  Staging form: Melanoma of the Skin, AJCC 8th Edition  - Clinical stage from 6/26/2020: Stage IV (rcT2a, cN1a, cM1d(0)) - Signed by Wilmer Welch MD on 2/21/2025  - Pathologic stage from 6/16/2024: Stage IIIA (pT2a, pN1a, cM0) - Signed by Wilmer Welch MD on 2/21/2025    Malignant neoplasm of overlapping sites of right breast in female, estrogen receptor positive  Staging form: Breast, AJCC 8th Edition  - Pathologic stage from 4/25/2025: Stage Unknown (pT1a, pNX, cM0, G2, ER+, FL+, HER2-) - Signed by Quynh Maguire MD on 4/25/2025       Disclaimer  SCC tumor board recommendations represent the consensus opinion of physicians present at a weekly patient care conference. The treating SCC physician is not always present, and many of the physicians formulating the recommendation have not personally seen or examined the patient under discussion. It is understood that the treating SCC physician considers the expertise of the Tumor Board Recommendation in formulating his/her plan for the patient. However, in many situations, based on individualized patient considerations, a different plan is determined by the treating physician to be the optimal medical  management.    Scribe Attestation  By signing my name below, I, Javier Mendez   attest that this documentation has been prepared under the direction and in the presence of BREAST TUMOR BOARD.

## 2025-05-02 NOTE — PROGRESS NOTES
"       PLASTIC SURGERY CLINIC VISIT  POSTOP BREAST RECONSTRUCTION     Date: 5/6/25  Date of Surgery: 4/9/25  Surgical Procedure: Right Mastectomy with Tissue Expander     HPI:   Tori Taylor 44 y.o. female is here for post-operative appointment for the above procedure(s).      Interval changes as of this date:   4/17 Doing well overall. Pain is well controlled. Endorses adequate protein intake and activity restrictions. Surgical dressings in place. HORACE drains in place with SS output.   4/22 Here today for drain removal evaluation. HORACE drain w SS output< 15 ml per day for 3 consecutive days  4/29 Doing well today. She reports feeling generally well with mild soreness and tenderness at the surgical site, but no significant pain. Notes increased fullness since drain removal. She denies fever, drainage, or systemic symptoms. She is concerned about an upcoming whole-body MRI related to her prior cancer diagnosis and asks whether the presence of the tissue expander will interfere with imaging.  5/6 Here today for seroma evaluation and fill  MEDICATIONS    Current Outpatient Medications:     acetaminophen (Tylenol) 500 mg tablet, Take 1 tablet (500 mg) by mouth., Disp: , Rfl:     albuterol 90 mcg/actuation inhaler, Inhale 2 puffs 4 times a day., Disp: 18 g, Rfl: 3    BD Luer-Juliana Syringe 3 mL 25 gauge x 1\" syringe, USE 1 SYRINGE TWICE WEEKLY FOR B12 INJECTIONS, Disp: 8 each, Rfl: 11    cholecalciferol (Vitamin D-3) 5,000 Units tablet, Take 1 tablet (125 mcg) by mouth once daily., Disp: , Rfl:     copper (ParaGard T 380A) 380 square mm IUD, by intrauterine route., Disp: , Rfl:     cyanocobalamin (Vitamin B-12) 1,000 mcg/mL injection, Inject 1 mL (1,000 mcg) into the muscle 2 times a week., Disp: 10 mL, Rfl: 0    fexofenadine (Allegra) 180 mg tablet, Take 1 tablet (180 mg) by mouth once daily as needed., Disp: , Rfl:     gabapentin (Neurontin) 300 mg capsule, Take 1 capsule (300 mg) by mouth every 8 hours for 14 days., " "Disp: 42 capsule, Rfl: 0    insulin syringe-needle U-100 1/2 mL 30 gauge syringe, , Disp: , Rfl:     insulin syringe-needle U-100 30G X 1/2\" 0.3 mL syringe, Use Once monthly for b 12 shot, Disp: 15 each, Rfl: 1    mometasone (Nasonex) 50 mcg/actuation nasal spray, Administer 1 spray into affected nostril(s) once daily., Disp: , Rfl:       OBJECTIVE [x]Expand by Default  There were no vitals taken for this visit.      REVIEW OF SYSTEMS:    Constitutional: negative for fevers, chills, unintentional weight loss  HEENT: negative for changes in vision, headaches, changes in hearing, congestion, sore throat  Cardiovascular: negative for chest pain, palpitations  Respiratory: negative for cough, shortness of breath  Gastrointestinal: negative for nausea, vomiting, diarrhea  Genitourinary: negative for dysuria, hematuria  Musculoskeletal: negative for joint swelling or pain  Skin: negative for rashes or lesions  Psych: negative for depression, anxiety  Endocrine: negative for polyuria, polydipsia, cold/heat intolerance  Hem/Lymph: negative for bleeding disorder     PHYSICAL EXAM  General: alert and oriented, no apparent distress    Focused exam of the breasts:  Right/: Incisions C/D/I, healing well. Mastectomy flaps viable and well perfused.***    MENTOR ARTOURA PLUS SMOOTH ULTRA HIGH PROFILE 350 cc    4/29 Expanded by 100 ml (Total 100) Aspirated 55 ml seroma   5/6 Expanded by     ASSESSMENT/PLAN  Tori Taylor 44 y.o. female who had Right Mastectomy with TE on 4/9/25 who presents for POV.    Doing well post operatively, pain well controlled. Endorses adequate protein intake. Following activity restrictions.    Incisions healing well. Mastectomy flaps viable and well perfused.     Continue activity restrictions  Continue increased protein intake        RTC:     " Expanded by 100 ml (Total 100) Aspirated 55 ml seroma   5/6 Expanded by 75 ml (Total 175 ml) Aspirated 15 ml seroma    ASSESSMENT/PLAN  Tori Taylor 44 y.o. female who had Right Mastectomy with TE on 4/9/25 who presents for POV.    Doing well post operatively, pain well controlled. Endorses adequate protein intake. Following activity restrictions.    Incisions healing well. Mastectomy flaps viable and well perfused.     Continue activity restrictions  Continue increased protein intake   Follow-up scheduled for the following Thursday.  Continue monitoring healing and adjusting treatment as needed.       RTC: 5/15/25       Scribe Attestation  By signing my name below, Shannan LEROY Scribe   attest that this documentation has been prepared under the direction and in the presence of Barbara Lira MD.

## 2025-05-06 ENCOUNTER — APPOINTMENT (OUTPATIENT)
Dept: PLASTIC SURGERY | Facility: CLINIC | Age: 45
End: 2025-05-06
Payer: COMMERCIAL

## 2025-05-06 VITALS — BODY MASS INDEX: 31.83 KG/M2 | HEIGHT: 68 IN | WEIGHT: 210 LBS

## 2025-05-06 DIAGNOSIS — D05.11 DUCTAL CARCINOMA IN SITU (DCIS) OF RIGHT BREAST: Primary | ICD-10-CM

## 2025-05-06 PROCEDURE — 99024 POSTOP FOLLOW-UP VISIT: CPT | Performed by: SURGERY

## 2025-05-06 PROCEDURE — 3008F BODY MASS INDEX DOCD: CPT | Performed by: SURGERY

## 2025-05-07 ENCOUNTER — OFFICE VISIT (OUTPATIENT)
Dept: HEMATOLOGY/ONCOLOGY | Facility: CLINIC | Age: 45
End: 2025-05-07
Payer: COMMERCIAL

## 2025-05-07 VITALS
TEMPERATURE: 97.7 F | RESPIRATION RATE: 16 BRPM | BODY MASS INDEX: 31.83 KG/M2 | HEIGHT: 68 IN | OXYGEN SATURATION: 98 % | WEIGHT: 210 LBS | SYSTOLIC BLOOD PRESSURE: 106 MMHG | DIASTOLIC BLOOD PRESSURE: 68 MMHG | HEART RATE: 70 BPM

## 2025-05-07 DIAGNOSIS — D05.11 DUCTAL CARCINOMA IN SITU (DCIS) OF RIGHT BREAST: ICD-10-CM

## 2025-05-07 PROCEDURE — 1036F TOBACCO NON-USER: CPT | Performed by: STUDENT IN AN ORGANIZED HEALTH CARE EDUCATION/TRAINING PROGRAM

## 2025-05-07 PROCEDURE — 99215 OFFICE O/P EST HI 40 MIN: CPT | Performed by: STUDENT IN AN ORGANIZED HEALTH CARE EDUCATION/TRAINING PROGRAM

## 2025-05-07 PROCEDURE — 3008F BODY MASS INDEX DOCD: CPT | Performed by: STUDENT IN AN ORGANIZED HEALTH CARE EDUCATION/TRAINING PROGRAM

## 2025-05-07 RX ORDER — TAMOXIFEN CITRATE 20 MG/1
20 TABLET ORAL DAILY
Qty: 90 TABLET | Refills: 1 | Status: SHIPPED | OUTPATIENT
Start: 2025-05-07 | End: 2026-05-07

## 2025-05-07 ASSESSMENT — PAIN SCALES - GENERAL: PAINLEVEL_OUTOF10: 0-NO PAIN

## 2025-05-07 ASSESSMENT — COLUMBIA-SUICIDE SEVERITY RATING SCALE - C-SSRS
2. HAVE YOU ACTUALLY HAD ANY THOUGHTS OF KILLING YOURSELF?: NO
6. HAVE YOU EVER DONE ANYTHING, STARTED TO DO ANYTHING, OR PREPARED TO DO ANYTHING TO END YOUR LIFE?: NO
1. IN THE PAST MONTH, HAVE YOU WISHED YOU WERE DEAD OR WISHED YOU COULD GO TO SLEEP AND NOT WAKE UP?: NO

## 2025-05-07 ASSESSMENT — ENCOUNTER SYMPTOMS
LOSS OF SENSATION IN FEET: 0
DEPRESSION: 0
OCCASIONAL FEELINGS OF UNSTEADINESS: 0

## 2025-05-07 NOTE — PROGRESS NOTES
Patient ID: Tori Taylor is a 44 y.o. female.    The patient presents to clinic today for her history of breast cancer.    Cancer Staging   Malignant melanoma of arm (Multi)  Staging form: Melanoma of the Skin, AJCC 8th Edition  - Clinical stage from 6/26/2020: Stage IV (rcT2a, cN1a, cM1d(0)) - Signed by Wilmer Welch MD on 2/21/2025  - Pathologic stage from 6/16/2024: Stage IIIA (pT2a, pN1a, cM0) - Signed by Wilmer Welch MD on 2/21/2025    Malignant neoplasm of overlapping sites of right breast in female, estrogen receptor positive  Staging form: Breast, AJCC 8th Edition  - Pathologic stage from 4/25/2025: Stage Unknown (pT1a, pNX, cM0, G2, ER+, NH+, HER2-) - Signed by Quynh Maguire MD on 4/25/2025        Diagnostic/Therapeutic History:    -12/5/2024. Bilateral screening mammogram. Left breast negative. Right breast calcifications BI-RADS 0     -12/10/2024. Right breast diagnostic imaging. Extensive indeterminate right breast calcifications are confirmed. Predominantly centered in the lower outer quadrant from the subareolar region to posterior depth. They extend anteriorly into the lower inner quadrant. They span over 10 cm. BI-RADS 4. 2 site biopsy is recommended     -01/02/2025: Bilateral breast MRI. Left breast negative. Right breast extensive area of non-mass enhancement centered in the lower outer quadrant. Non-mass enhancement extends at the subareolar region, far posterior depth. Enhancement begins at 9:00 and extends to 6:00. This spans 11 x 7.1 x 4.3 cm. They correlate to the extent of microcalcifications on mammogram. There is no enhancement of the chest wall or pectoralis muscle. No skin enhancement is appreciated though non-mass enhancement is superficial. Right axilla is not included in the field-of-view but there is no lymphadenopathy     - On 1/9/2025 she had a right breast stereotactic guided core needle biopsy that showed ductal carcinoma in situ, intermediate high-grade, solid cribriform,  papillary and micropapillary pattern with necrosis and calcifications ER: %.     -01/22/2025: Right axillary US negative    - On 4/9/2025 Dr. Bach performed a right breast mastectomy with tissue expander path showing a 3 mm grade 2 invasive ductal carcinoma.  DCIS extent 8.4 cm cribriform, micropapillary, papillary, solid, grade 3 and 7 out of 16 blocks all margins were negative final stage pT1aNx  ER 91 to 100%, VA 21 to 30%, HER2 1+, negative    - Discussed in TB- surgery is complete, do not recommend axillary surgery- follow up with Med/Onc to consider endocrine therapy    History of Present Illness (HPI)/Interval History:  Presenting to discuss treatment options    She denies any fevers or chills.    She denies any chest pain or breathing issues.     She denies any vision changes or headache issues, dizziness, loss of balance, neuropathy and no falls.     She denies any new or unexplained bone aches or pains.    She denies any skin lesions or masses, hair loss, nail changes, or oral sores.    She reports a normal appetite and normal bowel movements. Her weight is stable.     She denies any issues with sleep or fatigue.     PMH: hx of stage 3 melanoma in 2014 s/p local excision of right forearm and ax LND  treated with adjuvant interferon therapy (Mariano Brown). Developed solitary mets to the brain in June 202 treated with SBRT followed by Pembro x 2 years     PMH: detailed hx of melanoma (follows with Dr Welch)     - History of stage III melanoma diagnosed in 2014 s/p local excision of right forearm with partial thickness flap and axillary LND s/p 10 months of adjuvant interferon alpha therapy, stopped due to thyroiditis.  - Solitary brain metastasis left parietal lobe, status post left-sided craniotomy for hemorrhagic tumor resection and duraplasty with pericranial graft by Dr. Ruth Veliz on  6/26/2020.  - Completed adjuvant SBRT therapy post-resection site by Dr. Leticia Mendoza--dates 7/27-31/2020  -  "Pending PET/CT for 4 mm solitary RUL pulmonary nodule, sub cm liver lesion, and enlarged left adrenal gland.  - 2020:  Discussion for immunotherapy vs. 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  - 2020: Discussed results of 2020 PET/CT, discuss immunotherapy options after tumor board meeting 2020.  Consent for Pembrolizumab.  - Patient received 5 Pembrolizumab. infusions since 2020.  Hospitalized for confusion episode concerning for focal seizure.  MRI more suggesting of immunotherapy flare rather than progression of disease (missed last 2 doses due to symptoms).  - Plan to continue Keppra and resumed Pembrolizumab 2021  - Restaging scans 2022 with ALMA in brain or PET CT C/A/P  - Completed 2 years of Pembrolizumab, restaging  - Followed with serial imaging.  - 2023: CT C/A/P with an enlarging 1 cm liver lesion, uncertain etiology.  Stable pulmonary nodule.  Cervical cyst for which she will see gynecology.  No changes in MRI brain.  - 23: MRI Liver does not show any pathologic lesion.        Reproductive hx:; Ob/gyn history: Menarche 11 Menopause perimenopausal , age of first delivery 23  + OCPs, no fertility treatments, recent HRT     No family history of breast or ovarian cancer.        Review of Systems:  14-point ROS otherwise negative, as per HPI.    Medical History[1]    Surgical History[2]    Social History[3]    RX Allergies[4]    Current Medications[5]     Objective    BSA: 2.13 meters squared  /68 (BP Location: Left arm, Patient Position: Sitting)   Pulse 70   Temp 36.5 °C (97.7 °F) (Temporal)   Resp 16   Ht (S) 1.719 m (5' 7.68\")   Wt 95.3 kg (210 lb)   SpO2 98%   BMI 32.24 kg/m²     ECOG Performance Status:  ECOG performance status: Asymptomatic    Physical Exam    GA: alert, oriented, cooperative  HEENT: anicteric sclera, well injected conjunctiva  Heart: RRR  Lung: CTAB  Abd:+BS, soft, non tender  Ext: PPP, no LLE       Laboratory " Data:  Lab Results   Component Value Date    WBC 8.5 04/03/2025    HGB 13.5 04/03/2025    HCT 40.6 04/03/2025    MCV 96 04/03/2025     04/03/2025       Chemistry    Lab Results   Component Value Date/Time     04/03/2025 1529    K 4.1 04/03/2025 1529     04/03/2025 1529    CO2 29 04/03/2025 1529    BUN 11 04/03/2025 1529    CREATININE 0.73 04/03/2025 1529    Lab Results   Component Value Date/Time    CALCIUM 9.5 04/03/2025 1529    ALKPHOS 45 02/12/2025 1556    AST 12 02/12/2025 1556    ALT 9 02/12/2025 1556    BILITOT 0.4 02/12/2025 1556             Radiology:  ECG 12 Lead  Normal sinus rhythm  Normal ECG  When compared with ECG of 06-DEC-2020 18:30,  No significant change was found  Confirmed by Bernard David ([id number]) on 4/4/2025 6:47:58 PM       BI US breast limited right 01/22/2025    Narrative  Interpreted By:  Rola Henriquez,  STUDY:  BI US BREAST LIMITED RIGHT; 1/22/2025 3:35 pm    INDICATION:  Signs/Symptoms:right axilla scan for surgery planning. 44-year-old  female recently diagnosed with DCIS on stereotactic biopsy of right  breast calcifications. Personal history of right arm malignant  melanoma with a right axillary lymph node metastasis status post  axillary dissection in July 2024.    COMPARISON:  Prior screening mammograms 06/05/2023, 12/05/2024. Right diagnostic  mammogram 12/10/2024. Breast MRI 01/02/2025. Stereotactic biopsy and  postprocedure mammogram 01/09/2025.    ACCESSION NUMBER(S):  [id number]    ORDERING CLINICIAN:  COLE LOO    FINDINGS:  Multiple grayscale ultrasonographic images were obtained through the  right axilla.    No lymphadenopathy, mass or other focal sonographic abnormality is  seen in the right axilla. No right axillary lymph node is seen, in  keeping with history of right axillary lymph node dissection.    Impression  1. No sonographic evidence of malignancy or lymphadenopathy in the  right axilla. Recommend continued follow-up with Breast  Surgical  Oncology for appropriate management of known extensive right breast  malignancy.    BI-RADS CATEGORY:    BI-RADS Category:  2 Benign.  Recommendation:  Breast Surgery follow-up for treatment of known  right breast cancer. Recommended Date:  Immediate.  Laterality:  Right.    Signed by: Rola Henriquez 1/22/2025 4:01 PM  Dictation workstation:   [id number]          Assessment/Plan:    44 year old female patient with hx of stage 3 melanoma in 2014 s/p local excision of right forearm and ax LND  treated with adjuvant interferon therapy (Mariano Brown). Developed solitary mets to the brain in June 2020 treated with SBRT followed by Pembro x 2 years (follows with Dr Welch and Dr Musa).She recently diagnosed with right-sided breast cancer presenting to discuss treatment options.  Back in December 2024, she had right breast diagnostic imaging that showed extensive right breast calcifications spanning over 10 cm, further confirmed by breast MRI that showed right breast extensive area of non-mass enhancement centered in the lower outer quadrant, it extends at the subareolar region for posterior depth, spanning over 11 cm.  She had a stereotactic guided core needle biopsy that showed ductal carcinoma in situ, intermediate high-grade, solid cribriform, papillary and micropapillary pattern with necrosis and calcifications ER: %. Right axilla US negative.     On 4/9/2025 Dr. Bach performed a right breast mastectomy with tissue expander path showing a 3 mm grade 2 invasive ductal carcinoma.  DCIS extent 8.4 cm cribriform, micropapillary, papillary, solid, grade 3 and 7 out of 16 blocks all margins were negative final stage pT1aNx  ER 91 to 100%, SD 21 to 30%, HER2 1+, negative    - Discussed in TB- surgery is complete, do not recommend axillary surgery- follow up with Med/Onc to consider endocrine therapy    Plan:   - Discussed adjuvant tamoxifen for total of 5 years.  Discussed potential side effects.  Patient  agreeable to start prescription sent.  - Follow-up with Dr. Welch in regards to melanoma.  Plan to see Dr. Welch and get follow-up scans this month.  - Follow-up with Negrita Balderrama in 3-month    At least 60  minutes of direct consultation was spent reviewing the patient's chart as well as discussing and  reviewing the  cancer care plan including educating and answering questions and concerns, greater than 50 percent spent in counseling and coordination of care.           Shagufta Ross MD  Hematology and Medical Oncology  [facility]        [1]   Past Medical History:  Diagnosis Date    Acquired hypothyroidism     Allergy-induced asthma (HHS-HCC)     Anxiety     Ductal carcinoma in situ (DCIS) of right breast     Plan: Right Breast Mastectomy 4/9/2025    H/O echocardiogram 03/23/2021    CONCLUSIONS:  1. The left ventricular systolic function is normal with a 55% estimated ejection fraction.  2. There is mitral stenosis is not seen.  3. Aortic valve stenosis is not present.  4. The main pulmonary artery is normal in size, and position, with normal bifurcation into the left and right pulmonary arteries.    Lymphedema     Melanoma in situ of the skin (Multi)     melanoma diagnosed in 2014 s/p local excision of right forearm with partial thickness flap and axillary LND s/p 10 months  of adjuvant interferon alpha therapy, stopped due to thyroiditis.    Metastasis to brain (Multi)     Solitary brain metastasis left parietal lobe; s/p left-sided craniotomy for hemorrhagic tumor resection and duraplasty with pericranial graft by Dr. Ruth Veliz  on 6/26/2020    Obesity     Pulmonary nodule     4 mm solitary RUL; 7/2023: CT C/A/P: Stable pulmonary nodule    Seizures (Multi)     9/2020 focal seizure.  MRI more  suggesting of immunotherapy flare rather than progression of disease (missed last 2 doses due to symptoms). Completed Keppra therapy. TESTING NOT INDICATIVE OF ANY SEIZURE  "ACTIVITY    Vitamin B12 deficiency     Vitamin D deficiency    [2]   Past Surgical History:  Procedure Laterality Date    BREAST BIOPSY       SECTION, CLASSIC      CHOLECYSTECTOMY      CRANIOTOMY Left 2020    LYMPH NODE BIOPSY      LYMPHADENECTOMY      Axillary Lymphadenectomy    MASTECTOMY COMPLETE / SIMPLE Right 2025    Procedure: Right Breast Mastectomy;  Surgeon: Quynh Maguire MD;  Location: [facility];  Service: General Surgery Breast Oncology;  Laterality: Right;    OVARIAN CYST DRAINAGE Left     SENTINEL LYMPH NODE BIOPSY      SKIN CANCER EXCISION      local excision of right forearm with partial thickness flap    SKIN GRAFT      THYROIDECTOMY, PARTIAL      WISDOM TOOTH EXTRACTION     [3]   Social History  Socioeconomic History    Marital status:    Tobacco Use    Smoking status: Former     Current packs/day: 0.00     Types: Cigarettes     Quit date:      Years since quittin.3    Smokeless tobacco: Never    Tobacco comments:     Light social smoker    Vaping Use    Vaping status: Never Used   Substance and Sexual Activity    Alcohol use: Not Currently    Drug use: Never    Sexual activity: Yes     Partners: Male     Birth control/protection: I.U.D.   [4]   Allergies  Allergen Reactions    Adhesive Tape-Silicones Hives, Itching and Rash     ABLE TO TOLERATE PAPER TAPE ONLY    Levalbuterol Hcl Anaphylaxis    Codeine Hives    Sulfa (Sulfonamide Antibiotics) Nausea/vomiting   [5]   Current Outpatient Medications:     acetaminophen (Tylenol) 500 mg tablet, Take 1 tablet (500 mg) by mouth., Disp: , Rfl:     albuterol 90 mcg/actuation inhaler, Inhale 2 puffs 4 times a day., Disp: 18 g, Rfl: 3    BD Luer-Juliana Syringe 3 mL 25 gauge x 1\" syringe, USE 1 SYRINGE TWICE WEEKLY FOR B12 INJECTIONS, Disp: 8 each, Rfl: 11    cholecalciferol (Vitamin D-3) 5,000 Units tablet, Take 1 tablet (125 mcg) by mouth once daily., Disp: , Rfl:     copper (ParaGard T 380A) 380 square mm IUD, by intrauterine " "route., Disp: , Rfl:     cyanocobalamin (Vitamin B-12) 1,000 mcg/mL injection, Inject 1 mL (1,000 mcg) into the muscle 2 times a week., Disp: 10 mL, Rfl: 0    fexofenadine (Allegra) 180 mg tablet, Take 1 tablet (180 mg) by mouth once daily as needed., Disp: , Rfl:     gabapentin (Neurontin) 300 mg capsule, Take 1 capsule (300 mg) by mouth every 8 hours for 14 days., Disp: 42 capsule, Rfl: 0    insulin syringe-needle U-100 1/2 mL 30 gauge syringe, , Disp: , Rfl:     insulin syringe-needle U-100 30G X 1/2\" 0.3 mL syringe, Use Once monthly for b 12 shot, Disp: 15 each, Rfl: 1    mometasone (Nasonex) 50 mcg/actuation nasal spray, Administer 1 spray into affected nostril(s) once daily., Disp: , Rfl:     "

## 2025-05-08 ENCOUNTER — APPOINTMENT (OUTPATIENT)
Dept: PLASTIC SURGERY | Facility: CLINIC | Age: 45
End: 2025-05-08
Payer: COMMERCIAL

## 2025-05-13 ENCOUNTER — APPOINTMENT (OUTPATIENT)
Dept: HEMATOLOGY/ONCOLOGY | Facility: CLINIC | Age: 45
End: 2025-05-13
Payer: COMMERCIAL

## 2025-05-14 NOTE — PROGRESS NOTES
"       PLASTIC SURGERY CLINIC VISIT  POSTOP BREAST RECONSTRUCTION     Date: 5/15/25  Date of Surgery: 4/9/25  Surgical Procedure: Right Mastectomy with Tissue Expander     HPI:   Tori Taylor 44 y.o. female is here for post-operative appointment for the above procedure(s).      Interval changes as of this date:   4/17 Doing well overall. Pain is well controlled. Endorses adequate protein intake and activity restrictions. Surgical dressings in place. HORACE drains in place with SS output.   4/22 Here today for drain removal evaluation. HORACE drain w SS output< 15 ml per day for 3 consecutive days  4/29 Doing well today. She reports feeling generally well with mild soreness and tenderness at the surgical site, but no significant pain. Notes increased fullness since drain removal. She denies fever, drainage, or systemic symptoms. She is concerned about an upcoming whole-body MRI related to her prior cancer diagnosis and asks whether the presence of the tissue expander will interfere with imaging.  5/6 Here today for seroma evaluation and fill. She is doing overall well and is healing well. Denies any significant increase in size or symptoms and is not experiencing much discomfort. She mentions some frustration with recovery-related inactivity and expresses eagerness to return to normal activity. She notes a reduction in firmness compared to the last visit. Her daughter is currently helping with household tasks.   5/15: Doing well overall. Following activity restrictions. Eager to return to work; works from home at a desk.     MEDICATIONS    Current Outpatient Medications:     acetaminophen (Tylenol) 500 mg tablet, Take 1 tablet (500 mg) by mouth., Disp: , Rfl:     albuterol 90 mcg/actuation inhaler, Inhale 2 puffs 4 times a day., Disp: 18 g, Rfl: 3    BD Luer-Juliana Syringe 3 mL 25 gauge x 1\" syringe, USE 1 SYRINGE TWICE WEEKLY FOR B12 INJECTIONS, Disp: 8 each, Rfl: 11    cholecalciferol (Vitamin D-3) 5,000 Units tablet, " "Take 1 tablet (125 mcg) by mouth once daily., Disp: , Rfl:     copper (ParaGard T 380A) 380 square mm IUD, by intrauterine route., Disp: , Rfl:     cyanocobalamin (Vitamin B-12) 1,000 mcg/mL injection, Inject 1 mL (1,000 mcg) into the muscle 2 times a week., Disp: 10 mL, Rfl: 0    fexofenadine (Allegra) 180 mg tablet, Take 1 tablet (180 mg) by mouth once daily as needed., Disp: , Rfl:     gabapentin (Neurontin) 300 mg capsule, Take 1 capsule (300 mg) by mouth every 8 hours for 14 days., Disp: 42 capsule, Rfl: 0    insulin syringe-needle U-100 1/2 mL 30 gauge syringe, , Disp: , Rfl:     insulin syringe-needle U-100 30G X 1/2\" 0.3 mL syringe, Use Once monthly for b 12 shot, Disp: 15 each, Rfl: 1    mometasone (Nasonex) 50 mcg/actuation nasal spray, Administer 1 spray into affected nostril(s) once daily., Disp: , Rfl:     tamoxifen (Nolvadex) 20 mg tablet, Take 1 tablet (20 mg total) by mouth once daily.  Take with water or any other nonalcoholic drink with or without food at around the same time(s) every day., Disp: 90 tablet, Rfl: 1      OBJECTIVE [x]Expand by Default  There were no vitals taken for this visit.      REVIEW OF SYSTEMS:    Constitutional: negative for fevers, chills, unintentional weight loss  HEENT: negative for changes in vision, headaches, changes in hearing, congestion, sore throat  Cardiovascular: negative for chest pain, palpitations  Respiratory: negative for cough, shortness of breath  Gastrointestinal: negative for nausea, vomiting, diarrhea  Genitourinary: negative for dysuria, hematuria  Musculoskeletal: negative for joint swelling or pain  Skin: negative for rashes or lesions  Psych: negative for depression, anxiety  Endocrine: negative for polyuria, polydipsia, cold/heat intolerance  Hem/Lymph: negative for bleeding disorder     PHYSICAL EXAM  General: alert and oriented, no apparent distress    Focused exam of the breasts:  Right/: Incisions C/D/I, healing well. Mastectomy flaps viable " and well perfused.     KIM LANGSTONA PLUS SMOOTH ULTRA HIGH PROFILE 350 cc    4/29 Expanded by 100 ml (Total 100) Aspirated 55 ml seroma   5/8  Expanded by 75 ml (Total 175 ml) Aspirated  15  ml seroma   5/15 Expanded by 75 ml (Total 250 ml)     ASSESSMENT/PLAN  Tori Taylor 44 y.o. female who had Right Mastectomy with TE on 4/9/25 who presents for POV.    Doing well post operatively, pain well controlled. Endorses adequate protein intake. Following activity restrictions.    Incisions healing well. Mastectomy flaps viable and well perfused. No seroma on exam.    Ok to gradually increase activities including driving short distances  Continue increased protein intake   Can start working from home for short periods at a time   Steri strips re-applied, advised to blow dry on cool setting following shower  Filled with 75ml today, tolerated well   Plan for final fill at next visit, will start looking at surgery dates for her implant exchange    RTC 5/27     Neeta Fonseca PA-C

## 2025-05-15 ENCOUNTER — APPOINTMENT (OUTPATIENT)
Dept: PLASTIC SURGERY | Facility: CLINIC | Age: 45
End: 2025-05-15
Payer: COMMERCIAL

## 2025-05-15 VITALS — BODY MASS INDEX: 32.96 KG/M2 | HEIGHT: 67 IN | WEIGHT: 210 LBS

## 2025-05-15 DIAGNOSIS — Z98.890 S/P BREAST RECONSTRUCTION: Primary | ICD-10-CM

## 2025-05-16 DIAGNOSIS — C43.9 METASTATIC MELANOMA (MULTI): ICD-10-CM

## 2025-05-16 DIAGNOSIS — C77.9 MALIGNANT MELANOMA METASTATIC TO LYMPH NODE (MULTI): ICD-10-CM

## 2025-05-16 DIAGNOSIS — C79.31 MALIGNANT NEOPLASM METASTATIC TO BRAIN (MULTI): ICD-10-CM

## 2025-05-19 ENCOUNTER — LAB (OUTPATIENT)
Dept: LAB | Facility: HOSPITAL | Age: 45
End: 2025-05-19
Payer: COMMERCIAL

## 2025-05-19 ENCOUNTER — APPOINTMENT (OUTPATIENT)
Dept: RADIOLOGY | Facility: CLINIC | Age: 45
End: 2025-05-19
Payer: COMMERCIAL

## 2025-05-20 ENCOUNTER — HOSPITAL ENCOUNTER (OUTPATIENT)
Dept: RADIOLOGY | Facility: HOSPITAL | Age: 45
Discharge: HOME | End: 2025-05-20
Payer: COMMERCIAL

## 2025-05-20 DIAGNOSIS — C77.9 MALIGNANT MELANOMA METASTATIC TO LYMPH NODE (MULTI): ICD-10-CM

## 2025-05-20 DIAGNOSIS — C79.31 MALIGNANT NEOPLASM METASTATIC TO BRAIN (MULTI): ICD-10-CM

## 2025-05-20 DIAGNOSIS — C43.9 METASTATIC MELANOMA (MULTI): ICD-10-CM

## 2025-05-20 LAB
CREAT SERPL-MCNC: 0.7 MG/DL (ref 0.6–1.3)
GFR SERPL CREATININE-BSD FRML MDRD: >90 ML/MIN/1.73M*2

## 2025-05-20 PROCEDURE — 82565 ASSAY OF CREATININE: CPT

## 2025-05-20 PROCEDURE — 2550000001 HC RX 255 CONTRASTS: Performed by: INTERNAL MEDICINE

## 2025-05-20 PROCEDURE — 74177 CT ABD & PELVIS W/CONTRAST: CPT

## 2025-05-20 RX ADMIN — IOHEXOL 75 ML: 350 INJECTION, SOLUTION INTRAVENOUS at 08:12

## 2025-05-21 ENCOUNTER — TELEMEDICINE (OUTPATIENT)
Dept: HEMATOLOGY/ONCOLOGY | Facility: CLINIC | Age: 45
End: 2025-05-21
Payer: COMMERCIAL

## 2025-05-21 DIAGNOSIS — C79.31 MALIGNANT NEOPLASM METASTATIC TO BRAIN (MULTI): ICD-10-CM

## 2025-05-21 DIAGNOSIS — C77.9 MALIGNANT MELANOMA METASTATIC TO LYMPH NODE (MULTI): ICD-10-CM

## 2025-05-21 DIAGNOSIS — C43.9 METASTATIC MELANOMA (MULTI): ICD-10-CM

## 2025-05-21 PROCEDURE — 99214 OFFICE O/P EST MOD 30 MIN: CPT | Performed by: INTERNAL MEDICINE

## 2025-05-21 PROCEDURE — 1036F TOBACCO NON-USER: CPT | Performed by: INTERNAL MEDICINE

## 2025-05-22 ASSESSMENT — ENCOUNTER SYMPTOMS
BACK PAIN: 0
FATIGUE: 0
HEMATURIA: 0
HEMOPTYSIS: 0
DYSURIA: 0
DIFFICULTY URINATING: 0
FLANK PAIN: 0
TROUBLE SWALLOWING: 0
FREQUENCY: 0
SCLERAL ICTERUS: 0
CONSTIPATION: 0
NERVOUS/ANXIOUS: 1
CHEST TIGHTNESS: 0
VOMITING: 0
ABDOMINAL PAIN: 0
CHILLS: 0
SORE THROAT: 0
FEVER: 0
DIARRHEA: 0
SEIZURES: 0
ADENOPATHY: 0
EXTREMITY WEAKNESS: 0
CONFUSION: 0
DIZZINESS: 0
EYE PROBLEMS: 0
MYALGIAS: 0
HOT FLASHES: 0
COUGH: 0
APPETITE CHANGE: 0
NAUSEA: 0
DEPRESSION: 0
SHORTNESS OF BREATH: 0
PALPITATIONS: 0
LEG SWELLING: 0

## 2025-05-22 NOTE — PROGRESS NOTES
Patient ID: Tori Taylor is a 44 y.o. female.  Referring Physician: Wilmer Welch MD  [address] [place] [address]  Primary Care Provider: Silvia Sanon MD     Chief Complaint   Patient presents with    Follow-up     Review CT scan and determine next steps in care    Skin Cancer     Metastatic Melanoma      Oncology History   Malignant melanoma of arm (Multi)   8/25/2014 Initial Diagnosis    Ms. Tori Taylor was first diagnosed with skin melanoma of the right dorsal forearm back in 2014.  At that time he underwent wide local excision and sentinel lymph node biopsy in March 2014.  The final pathology was pT2a N1a M0 melanoma- AJCC 8th Ed. - Stage IIIA.  She had a complete lymph node dissection in which 42 lymph nodes were removed from the right axilla-no additional lymph nodes were found positive. She received 9-months on adjuvant interferon. Unfortunately, she had a relapse in 2020 in the brain as solitary metastasis. She underwent resection and radiation in 06/2020 followed by 2 year of adjuvant pemrolizumab.     6/26/2020 Cancer Staged    Staging form: Melanoma of the Skin, AJCC 8th Edition, Clinical stage from 6/26/2020: Stage IV (rcT2a, cN1a, cM1d(0)) - Signed by Wilmer Welch MD on 2/21/2025 6/16/2024 Cancer Staged    Staging form: Melanoma of the Skin, AJCC 8th Edition, Pathologic stage from 6/16/2024: Stage IIIA (pT2a, pN1a, cM0) - Signed by Wilmer Welch MD on 2/21/2025     Malignant neoplasm of overlapping sites of right breast in female, estrogen receptor positive   4/25/2025 Initial Diagnosis    Malignant neoplasm of overlapping sites of right breast in female, estrogen receptor positive     4/25/2025 Cancer Staged    Staging form: Breast, AJCC 8th Edition, Pathologic stage from 4/25/2025: Stage Unknown (pT1a, pNX, cM0, G2, ER+, OK+, HER2-) - Signed by Quynh Maguire MD on 4/25/2025        Ms. Tori Taylor was diagnosed with stage 3 melanoma in 2014, treated by   Mariano Brown with adjuvant Interferon therapy. 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 Detailed history as below (recorded by Dr. Steve Vega).    - History of stage III melanoma diagnosed in 2014 s/p local excision of right forearm with partial thickness flap and axillary LND s/p 10 months of adjuvant interferon alpha therapy, stopped due to thyroiditis.  - Solitary brain metastasis left parietal lobe, status post left-sided craniotomy for hemorrhagic tumor resection and duraplasty with pericranial graft by Dr. Ruth Veliz on  6/26/2020.  - Completed adjuvant SBRT therapy post-resection site by Dr. Leticia Mendoza--dates 7/27-31/2020  - Pending PET/CT for 4 mm solitary RUL pulmonary nodule, sub cm liver lesion, and enlarged left adrenal gland.  - 8/7/2020:  Discussion for immunotherapy vs. 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  - 8/21/2020: Discussed results of 8/13/2020 PET/CT, discuss immunotherapy options after tumor board meeting 8/17/2020.  Consent for Pembrolizumab.  - Patient received 5 Pembrolizumab. infusions since September 2020.  Hospitalized for confusion episode concerning for focal seizure.  MRI more suggesting of immunotherapy flare rather than progression of disease (missed last 2 doses due to symptoms).  - Plan to continue Keppra and resumed Pembrolizumab 2/1/2021  - Restaging scans 5/2022 with ALMA in brain or PET CT C/A/P  - Completed 2 years of Pembrolizumab, restaging  - Followed with serial imaging.  - 7/2023: CT C/A/P with an enlarging 1 cm liver lesion, uncertain etiology.  Stable pulmonary nodule.  Cervical cyst for which she will see gynecology.  No changes in MRI brain.  - 7/29/23: MRI Liver does not show any pathologic lesion.      She established care with our medical oncology team (Dr. Wilmer Welch) on 8/10/23 and has been under imaging surveillance since then.     Treatment History:  "  3/7/2014: Cancer Related Surgery: OPERATION/PROCEDURE:    Wide excision malignant melanoma of right arm, 10 x 5 cm skin excision.  Full-thickness skin graft to right arm skin and soft tissue defect, 6 x 3 cm.  Right axillary sentinel lymph node biopsy.    09/02/2014- commenced adjuvant high dose IFN alpha 2B     14/9/2020: Pembrolizumab 200mg IV every 3 weeks (5 doses, then missed 2 doses for concern of new findings on MRI)--Resumed #6 2/1/2021. Last dose 09/2022.    In December 2024, Ms. Taylor was found to have extensive calcifications in the right breast which were investigated further with imaging. A stereotactic biopsy on 1/9/25 showed this to be DCIS with intermediate high-grade, solid cribriform, papillary and micropapillary pattern with necrosis and calcifications ER: %. A right breast mastectomy was completed on 4/9/25 which showed a 3 mm grade 2 invasive ductal carcinoma.  DCIS extent 8.4 cm cribriform, micropapillary, papillary, solid, grade 3 and 7 out of 16 blocks all margins were negative final stage pT1aNx  ER 91 to 100%, AR 21 to 30%, HER2 1+, negative. The patient has been on tamoxifen therapy since then.      Subjective   Please refer to \"Notes/Cancer History\" above for complete History of present illness.     Virtual or Telephone Consent    An interactive audio and video telecommunication system which permits real time communications between the patient (at the originating site) and provider (at the distant site) was utilized to provide this telehealth service.   Verbal consent was requested and obtained from Tori Taylor on this date, 5/21/25 for a telehealth visit and the patient's location was confirmed at the time of the visit.     Ms. Tori Taylor is recovering from the surgery. She is currently on the endocrine therapy and is under active care of the breast oncology team. She wants to review the CT scans.      Review of Systems:   Review of Systems   Constitutional: "  Negative for appetite change, chills, fatigue and fever.   HENT:   Negative for hearing loss, lump/mass, mouth sores, sore throat and trouble swallowing.    Eyes:  Negative for eye problems and icterus.   Respiratory:  Negative for chest tightness, cough, hemoptysis and shortness of breath.    Cardiovascular:  Negative for chest pain, leg swelling and palpitations.   Gastrointestinal:  Negative for abdominal pain, constipation, diarrhea, nausea and vomiting.   Endocrine: Negative for hot flashes.   Genitourinary:  Negative for difficulty urinating, dysuria, frequency and hematuria.    Musculoskeletal:  Negative for back pain, flank pain, gait problem and myalgias.   Skin:  Negative for itching and rash.   Neurological:  Negative for dizziness, extremity weakness, gait problem and seizures.   Hematological:  Negative for adenopathy.   Psychiatric/Behavioral:  Negative for confusion and depression. The patient is nervous/anxious.          MEDICAL HISTORY  Past Medical History:   Diagnosis Date    Acquired hypothyroidism     Allergy-induced asthma ([facility]HS-HCC)     Anxiety     Ductal carcinoma in situ (DCIS) of right breast     Plan: Right Breast Mastectomy 4/9/2025    H/O echocardiogram 03/23/2021    CONCLUSIONS:  1. The left ventricular systolic function is normal with a 55% estimated ejection fraction.  2. There is mitral stenosis is not seen.  3. Aortic valve stenosis is not present.  4. The main pulmonary artery is normal in size, and position, with normal bifurcation into the left and right pulmonary arteries.    Lymphedema     Melanoma in situ of the skin (Multi)     melanoma diagnosed in 2014 s/p local excision of right forearm with partial thickness flap and axillary LND s/p 10 months  of adjuvant interferon alpha therapy, stopped due to thyroiditis.    Metastasis to brain (Multi)     Solitary brain metastasis left parietal lobe; s/p left-sided craniotomy for hemorrhagic tumor resection and duraplasty with  "pericranial graft by Dr. Ruth Veliz  on 2020    Obesity     Pulmonary nodule     4 mm solitary RUL; 2023: CT C/A/P: Stable pulmonary nodule    Seizures (Multi)     2020 focal seizure.  MRI more  suggesting of immunotherapy flare rather than progression of disease (missed last 2 doses due to symptoms). Completed Keppra therapy. TESTING NOT INDICATIVE OF ANY SEIZURE ACTIVITY    Vitamin B12 deficiency     Vitamin D deficiency        FAMILY HISTORY  Family History   Problem Relation Name Age of Onset    Deep vein thrombosis Mother Kendra Lane 40    Other (benign breast tumor) Mother Kendra Lane     Blood clot Mother Kendra Lane     Diabetes Father Obed Azevedo     No Known Problems Brother      Hypertension Maternal Grandmother Willow Rizzo ()     Asthma Son Ryland        TOBACCO HISTORY  Tobacco Use: Medium Risk (2025)    Patient History     Smoking Tobacco Use: Former     Smokeless Tobacco Use: Never     Passive Exposure: Not on file       SOCIAL HISTORY  Social Connections: Not on file        Outpatient Medication Profile:  Current Outpatient Medications on File Prior to Visit   Medication Sig Dispense Refill    acetaminophen (Tylenol) 500 mg tablet Take 1 tablet (500 mg) by mouth.      albuterol 90 mcg/actuation inhaler Inhale 2 puffs 4 times a day. 18 g 3    BD Luer-Juliana Syringe 3 mL 25 gauge x 1\" syringe USE 1 SYRINGE TWICE WEEKLY FOR B12 INJECTIONS 8 each 11    cholecalciferol (Vitamin D-3) 5,000 Units tablet Take 1 tablet (125 mcg) by mouth once daily.      copper (ParaGard T 380A) 380 square mm IUD by intrauterine route.      cyanocobalamin (Vitamin B-12) 1,000 mcg/mL injection Inject 1 mL (1,000 mcg) into the muscle 2 times a week. 10 mL 0    fexofenadine (Allegra) 180 mg tablet Take 1 tablet (180 mg) by mouth once daily as needed.      gabapentin (Neurontin) 300 mg capsule Take 1 capsule (300 mg) by mouth every 8 hours for 14 days. 42 capsule 0    insulin syringe-needle " "U-100 1/2 mL 30 gauge syringe       insulin syringe-needle U-100 30G X 1/2\" 0.3 mL syringe Use Once monthly for b 12 shot 15 each 1    mometasone (Nasonex) 50 mcg/actuation nasal spray Administer 1 spray into affected nostril(s) once daily.      tamoxifen (Nolvadex) 20 mg tablet Take 1 tablet (20 mg total) by mouth once daily.  Take with water or any other nonalcoholic drink with or without food at around the same time(s) every day. 90 tablet 1     No current facility-administered medications on file prior to visit.         Performance Status:  Asymptomatic     Vitals and Measurements:   There were no vitals taken for this visit.      Physical Exam:   Physical Exam  Constitutional:       Appearance: Normal appearance.   HENT:      Nose: Nose normal.   Eyes:      Extraocular Movements: Extraocular movements intact.      Pupils: Pupils are equal, round, and reactive to light.   Pulmonary:      Effort: Pulmonary effort is normal.   Musculoskeletal:      Cervical back: Normal range of motion.   Neurological:      General: No focal deficit present.      Mental Status: She is alert and oriented to person, place, and time.   Psychiatric:         Mood and Affect: Mood normal.         Behavior: Behavior normal.         Thought Content: Thought content normal.          Lab Results:  I have reviewed these laboratory results:     Hospital Outpatient Visit on 05/20/2025   Component Date Value Ref Range Status    POCT Creatinine 05/20/2025 0.70  0.60 - 1.30 mg/dL Final    POCT eGFR 05/20/2025 >90  >=60 mL/min/1.73m*2 Final         Radiology Result:  I have reviewed the latest Imaging in PACS and the findings are noted in this note. I discussed the results of the latest imaging with the patient. All previous imaging were reviewed at the time it was completed. Full records are available in the EMR for review as well.     CT chest abdomen pelvis w IV contrast  Result Date: 5/21/2025  Impression: 1. Postsurgical changes status post " right mastectomy with implant placement with postinflammatory change and edema along the surrounding subcutaneous tissues. No right axillary lymphadenopathy demonstrated. Area of nodular density as measured on the prior CT in the right axilla superiorly is not included on this exam. Follow up as clinically indicated   2. Otherwise, stable CT chest, abdomen, and pelvis.     MACRO: None   Signed by: Angela Wilburn 5/21/2025 10:57 AM Dictation workstation:   [id number]    Pathology Results:  I have reviewed the full pathology report recorded in the EMR. The pertinent portions indicating diagnosis are listed here in the note. for details please refer to the full report recorded in the EMR.    Surgical Pathology [Jul 10 2020 6:44PM] ([id number])  Specimens: LEFT BRAIN MASS /Received fresh for intraoperative consultation, labeled with the patient's   Name TORI TEMPLETON     Accession #: [id number]   Pathologist: BREE CAMARGO MD   Date of Procedure: 6/26/2020    Date Received: 6/26/2020   Date Reported 7/1/2020   Submitting Physician: WESTON OBREGON MD   Location: [facility] Other External #     FINAL DIAGNOSIS   A. LEFT BRAIN MASS, REMOVAL:   -METASTATIC MALIGNANT MELANOMA.     Note: SOX10 immunoreactivity is identified within tumor cell nuclei.     The gross and/or microscopic findings were reviewed in conjunction with pathology resident, Cheng Castro MD.      Assessment and Plan:   Assessment/Plan      Ms. Tori Templeton is a 44 y.o. female with a diagnosis of recurrent metastatic melanoma (BRAF V600E positive). Please see the evolution of the case listed above in the cancer history.     Today,   We reviewed the CT scan report and the images together via the screen share mechanism. We showed her the images and discussed the report. There are new changes secondary to the breast cancer surgery, but overall from the melanoma standpoint, there is no evidence of recurrence in the extra-cranial  space. I recommend continued surveillance and the next scan will be in 6-months. She will continue following neuro-oncology for intra-cranial monitoring and will also follow breast oncology team.     # Metastatic Melanoma  - Next CT scan in 6 months with follow up after.     # Melanoma metastasis to the brain  - Follows Dr. John Musa in neurology-Oncology.     # Breast cancer- early stage  - Follows breast cancer team.      DISCLAIMER:   In preparing for this visit and writing this note, I reviewed all the previous electronic medical records (labs, imaging and medical charts) of the patient available in the physician portal. Significant findings which helped in decision making are recorded  in this chart.     The plan was discussed with the patient. We gave her ample opportunities to ask questions. All questions were answered to her satisfaction and she verbalized understanding.      Wilmer Welch MD    Division of Hematology and Oncology  [facility]    Co-Director Sarcoma and Cutaneous Oncology  [facility]    Phone: [phone]  Fax: [phone]

## 2025-05-27 ENCOUNTER — APPOINTMENT (OUTPATIENT)
Dept: PLASTIC SURGERY | Facility: CLINIC | Age: 45
End: 2025-05-27
Payer: COMMERCIAL

## 2025-05-28 NOTE — PROGRESS NOTES
"       PLASTIC SURGERY CLINIC VISIT  POSTOP BREAST RECONSTRUCTION     Date: 5/29/25  Date of Surgery: 4/9/25  Surgical Procedure: Right Mastectomy with Tissue Expander     HPI:   Tori Taylor 44 y.o. female is here for post-operative appointment for the above procedure(s).      Interval changes as of this date:   4/17 Doing well overall. Pain is well controlled. Endorses adequate protein intake and activity restrictions. Surgical dressings in place. HORACE drains in place with SS output.   4/22 Here today for drain removal evaluation. HORACE drain w SS output< 15 ml per day for 3 consecutive days  4/29 Doing well today. She reports feeling generally well with mild soreness and tenderness at the surgical site, but no significant pain. Notes increased fullness since drain removal. She denies fever, drainage, or systemic symptoms. She is concerned about an upcoming whole-body MRI related to her prior cancer diagnosis and asks whether the presence of the tissue expander will interfere with imaging.  5/6 Here today for seroma evaluation and fill. She is doing overall well and is healing well. Denies any significant increase in size or symptoms and is not experiencing much discomfort. She mentions some frustration with recovery-related inactivity and expresses eagerness to return to normal activity. She notes a reduction in firmness compared to the last visit. Her daughter is currently helping with household tasks.   5/15: Doing well overall. Following activity restrictions. Eager to return to work; works from home at a desk.   5/29 Doing well overall. Returning to work is going well. Here for final fill.     MEDICATIONS    Current Outpatient Medications:     acetaminophen (Tylenol) 500 mg tablet, Take 1 tablet (500 mg) by mouth., Disp: , Rfl:     albuterol 90 mcg/actuation inhaler, Inhale 2 puffs 4 times a day., Disp: 18 g, Rfl: 3    BD Luer-Juliana Syringe 3 mL 25 gauge x 1\" syringe, USE 1 SYRINGE TWICE WEEKLY FOR B12 " "INJECTIONS, Disp: 8 each, Rfl: 11    cholecalciferol (Vitamin D-3) 5,000 Units tablet, Take 1 tablet (125 mcg) by mouth once daily., Disp: , Rfl:     copper (ParaGard T 380A) 380 square mm IUD, by intrauterine route., Disp: , Rfl:     cyanocobalamin (Vitamin B-12) 1,000 mcg/mL injection, Inject 1 mL (1,000 mcg) into the muscle 2 times a week., Disp: 10 mL, Rfl: 0    fexofenadine (Allegra) 180 mg tablet, Take 1 tablet (180 mg) by mouth once daily as needed., Disp: , Rfl:     gabapentin (Neurontin) 300 mg capsule, Take 1 capsule (300 mg) by mouth every 8 hours for 14 days., Disp: 42 capsule, Rfl: 0    insulin syringe-needle U-100 1/2 mL 30 gauge syringe, , Disp: , Rfl:     insulin syringe-needle U-100 30G X 1/2\" 0.3 mL syringe, Use Once monthly for b 12 shot, Disp: 15 each, Rfl: 1    mometasone (Nasonex) 50 mcg/actuation nasal spray, Administer 1 spray into affected nostril(s) once daily., Disp: , Rfl:     tamoxifen (Nolvadex) 20 mg tablet, Take 1 tablet (20 mg total) by mouth once daily.  Take with water or any other nonalcoholic drink with or without food at around the same time(s) every day., Disp: 90 tablet, Rfl: 1      OBJECTIVE [x]Expand by Default  There were no vitals taken for this visit.      REVIEW OF SYSTEMS:    Constitutional: negative for fevers, chills, unintentional weight loss  HEENT: negative for changes in vision, headaches, changes in hearing, congestion, sore throat  Cardiovascular: negative for chest pain, palpitations  Respiratory: negative for cough, shortness of breath  Gastrointestinal: negative for nausea, vomiting, diarrhea  Genitourinary: negative for dysuria, hematuria  Musculoskeletal: negative for joint swelling or pain  Skin: negative for rashes or lesions  Psych: negative for depression, anxiety  Endocrine: negative for polyuria, polydipsia, cold/heat intolerance  Hem/Lymph: negative for bleeding disorder     PHYSICAL EXAM  General: alert and oriented, no apparent distress    Focused " exam of the breasts:  Right/: Incisions C/D/I, healing well. Mastectomy flaps viable and well perfused. No seroma.    MENTOR ARTOURA PLUS SMOOTH ULTRA HIGH PROFILE 350 cc    4/29 Expanded by 100 ml (Total 100) Aspirated 55 ml seroma   5/8  Expanded by 75 ml (Total 175 ml) Aspirated  15  ml seroma   5/15 Expanded by 75 ml (Total 250 ml)   5/29 Expanded by 100 ml (Total 350 ml)    ASSESSMENT/PLAN  Tori Taylor 44 y.o. female who had Right Mastectomy with TE on 4/9/25 who presents for POV.    Doing well post operatively, pain well controlled. Endorses adequate protein intake. Following activity restrictions.    Incisions well healed. Mastectomy flaps viable and well perfused. No seroma on exam. Expanded by 100ml today, tolerated well.     Ok to gradually increase activities including driving short distances  Continue increased protein intake   Cleared to return to work   No further steri strips needed  Expanded with 100 ml today, tolerated well. Fills complete   Pt scheduled for implant exchange on 7/28/25    RTC for pre-op appt on 6/24    Neeta Fonseca PA-C

## 2025-05-29 ENCOUNTER — APPOINTMENT (OUTPATIENT)
Dept: PLASTIC SURGERY | Facility: CLINIC | Age: 45
End: 2025-05-29
Payer: COMMERCIAL

## 2025-05-29 VITALS — WEIGHT: 210 LBS | BODY MASS INDEX: 32.96 KG/M2 | HEIGHT: 67 IN

## 2025-05-29 DIAGNOSIS — Z98.890 S/P BREAST RECONSTRUCTION: Primary | ICD-10-CM

## 2025-06-03 DIAGNOSIS — D05.11 DUCTAL CARCINOMA IN SITU (DCIS) OF RIGHT BREAST: Primary | ICD-10-CM

## 2025-06-19 ASSESSMENT — PROMIS GLOBAL HEALTH SCALE
RATE_SOCIAL_SATISFACTION: GOOD
RATE_QUALITY_OF_LIFE: GOOD
CARRYOUT_PHYSICAL_ACTIVITIES: COMPLETELY
RATE_AVERAGE_PAIN: 3
CARRYOUT_SOCIAL_ACTIVITIES: GOOD
RATE_AVERAGE_FATIGUE: MILD
RATE_PHYSICAL_HEALTH: GOOD
RATE_MENTAL_HEALTH: GOOD
EMOTIONAL_PROBLEMS: RARELY
RATE_GENERAL_HEALTH: GOOD

## 2025-06-20 ENCOUNTER — OFFICE VISIT (OUTPATIENT)
Dept: PRIMARY CARE | Facility: CLINIC | Age: 45
End: 2025-06-20
Payer: COMMERCIAL

## 2025-06-20 VITALS
WEIGHT: 208 LBS | HEART RATE: 60 BPM | TEMPERATURE: 98.2 F | BODY MASS INDEX: 32.65 KG/M2 | DIASTOLIC BLOOD PRESSURE: 78 MMHG | HEIGHT: 67 IN | SYSTOLIC BLOOD PRESSURE: 117 MMHG | OXYGEN SATURATION: 98 %

## 2025-06-20 DIAGNOSIS — Z85.820 PERSONAL HISTORY OF MALIGNANT MELANOMA OF SKIN: ICD-10-CM

## 2025-06-20 DIAGNOSIS — G47.9 SLEEP DIFFICULTIES: ICD-10-CM

## 2025-06-20 DIAGNOSIS — Z00.00 WELL ADULT EXAM: Primary | ICD-10-CM

## 2025-06-20 DIAGNOSIS — Z17.1 MALIGNANT NEOPLASM OF UPPER-OUTER QUADRANT OF LEFT BREAST IN FEMALE, ESTROGEN RECEPTOR NEGATIVE: ICD-10-CM

## 2025-06-20 DIAGNOSIS — E03.9 ACQUIRED HYPOTHYROIDISM: ICD-10-CM

## 2025-06-20 DIAGNOSIS — D05.11 DUCTAL CARCINOMA IN SITU (DCIS) OF RIGHT BREAST: ICD-10-CM

## 2025-06-20 DIAGNOSIS — J45.20 MILD INTERMITTENT ASTHMA, UNSPECIFIED WHETHER COMPLICATED (HHS-HCC): ICD-10-CM

## 2025-06-20 DIAGNOSIS — C50.412 MALIGNANT NEOPLASM OF UPPER-OUTER QUADRANT OF LEFT BREAST IN FEMALE, ESTROGEN RECEPTOR NEGATIVE: ICD-10-CM

## 2025-06-20 DIAGNOSIS — G89.18 POST-OP PAIN: ICD-10-CM

## 2025-06-20 DIAGNOSIS — G43.809 OTHER MIGRAINE WITHOUT STATUS MIGRAINOSUS, NOT INTRACTABLE: ICD-10-CM

## 2025-06-20 DIAGNOSIS — E27.8 OTHER SPECIFIED DISORDERS OF ADRENAL GLAND: ICD-10-CM

## 2025-06-20 DIAGNOSIS — E55.9 VITAMIN D DEFICIENCY: ICD-10-CM

## 2025-06-20 PROBLEM — M54.9 DORSALGIA: Status: RESOLVED | Noted: 2019-03-04 | Resolved: 2025-06-20

## 2025-06-20 PROBLEM — R06.00 DYSPNEA: Status: RESOLVED | Noted: 2023-05-18 | Resolved: 2025-06-20

## 2025-06-20 PROBLEM — L81.4 LENTIGINES: Status: RESOLVED | Noted: 2020-08-27 | Resolved: 2025-06-20

## 2025-06-20 PROBLEM — R06.89 DECREASED BREATH SOUNDS: Status: RESOLVED | Noted: 2023-05-18 | Resolved: 2025-06-20

## 2025-06-20 PROBLEM — J45.901 ASTHMA WITH ACUTE EXACERBATION (HHS-HCC): Status: RESOLVED | Noted: 2023-05-18 | Resolved: 2025-06-20

## 2025-06-20 PROBLEM — Z88.2 ALLERGY TO SULFA DRUGS: Status: RESOLVED | Noted: 2019-03-04 | Resolved: 2025-06-20

## 2025-06-20 PROCEDURE — 3008F BODY MASS INDEX DOCD: CPT | Performed by: INTERNAL MEDICINE

## 2025-06-20 PROCEDURE — 99396 PREV VISIT EST AGE 40-64: CPT | Performed by: INTERNAL MEDICINE

## 2025-06-20 PROCEDURE — 1036F TOBACCO NON-USER: CPT | Performed by: INTERNAL MEDICINE

## 2025-06-20 RX ORDER — ALBUTEROL SULFATE 90 UG/1
2 INHALANT RESPIRATORY (INHALATION)
Qty: 18 G | Refills: 3 | Status: SHIPPED | OUTPATIENT
Start: 2025-06-20

## 2025-06-20 RX ORDER — GABAPENTIN 300 MG/1
300 CAPSULE ORAL 2 TIMES DAILY PRN
Qty: 60 CAPSULE | Refills: 0 | Status: SHIPPED | OUTPATIENT
Start: 2025-06-20 | End: 2025-07-04

## 2025-06-20 NOTE — PROGRESS NOTES
"  Over the past 2 weeks, how often have you been bothered by any of the following problems?     Unable to screen due to: --   Little interest or pleasure in doing things Not at all   Feeling down, depressed, or hopeless Not at all   Patient Health Questionnaire-2 Score 0   Subjective       Current Issues:  Current concerns include recent breast ca  Mastectomy no chemo or radiation.  Sleep: all night  No bowel or bladder issues  No cp or sob or depression  Wt up 20 #  Eating more vegetables  Feet hurt  Some joint pain  After tamoxifen  Goal for 5 years on tamoxifen      Review of Nutrition:  Current diet: discussed   Exercise discussed    Gen:  no fever  HEENT:  no trouble swallowing  CV:  no dyspnea, cyanosis  Lungs:  no shortness of breath  GI:  no constipation, no blood in stool  Vascular:  no edema  Neuro:   no weakness  Skin:  no rash  MS:no joint swelling  Gu:  no urinary complaints  All other systems have been reviewed and are negative for complaint         Screening Questions:  Objective   /78   Pulse 60   Temp 36.8 °C (98.2 °F)   Ht 1.702 m (5' 7\")   Wt 94.3 kg (208 lb)   SpO2 98%   BMI 32.58 kg/m²       General:   alert and oriented, in no acute distress   Gait:   normal   Skin:   normal   Oral cavity:   lips, mucosa, and tongue normal; teeth and gums normal   Eyes:   sclerae white, pupils equal and reactive   Ears:   normal bilaterally Tms grey   Neck:   no adenopathy and thyroid not enlarged, symmetric, no tenderness/mass/nodules   Lungs:  clear to auscultation bilaterally   Heart:   regular rate and rhythm, S1, S2 normal, no murmur, click, rub or gallop   Abdomen:  soft, non-tender; bowel sounds normal; no masses, no organomegaly   : ne       Extremities:  extremities normal, warm and well-perfused; no cyanosis, clubbing, or edema,   Neuro:  normal without focal findings and muscle tone and strength normal and symmetric       Tori was seen today for annual exam.  Diagnoses and all orders " for this visit:  Well adult exam (Primary)  Other specified disorders of adrenal gland  Comments:  Adrenal glands demonstrate right adrenal nodularity measuring 2.2 cm  unchanged given differences in slice position measurement stable  Ductal carcinoma in situ (DCIS) of right breast  Acquired hypothyroidism  Other migraine without status migrainosus, not intractable  Personal history of malignant melanoma of skin  Mild intermittent asthma, unspecified whether complicated ([facility])  -     albuterol 90 mcg/actuation inhaler; Inhale 2 puffs 4 times a day.  Malignant neoplasm of upper-outer quadrant of left breast in female, estrogen receptor negative  -     gabapentin (Neurontin) 300 mg capsule; Take 1 capsule (300 mg) by mouth 2 times a day as needed (pain/sleep) for up to 14 days.  Post-op pain  -     gabapentin (Neurontin) 300 mg capsule; Take 1 capsule (300 mg) by mouth 2 times a day as needed (pain/sleep) for up to 14 days.  Sleep difficulties  Comments:  ok to use gabapentin for sleep  worked well , pt has used    Follow up in 3 months or prn.  Expect weight to decrease as she becomes more active   Chronic conditions reviewed in the assessment and plan.    Continue medications unless specified otherwise.  Previous labs reviewed.   Other specialty provider notes reviewed.

## 2025-06-23 ENCOUNTER — PREP FOR PROCEDURE (OUTPATIENT)
Dept: OCCUPATIONAL MEDICINE | Facility: HOSPITAL | Age: 45
End: 2025-06-23
Payer: COMMERCIAL

## 2025-06-23 RX ORDER — CELECOXIB 200 MG/1
200 CAPSULE ORAL 2 TIMES DAILY
Qty: 28 CAPSULE | Refills: 0 | Status: SHIPPED | OUTPATIENT
Start: 2025-06-23 | End: 2025-07-07

## 2025-06-23 RX ORDER — GABAPENTIN 300 MG/1
600 CAPSULE ORAL EVERY 8 HOURS
Qty: 84 CAPSULE | Refills: 0 | Status: SHIPPED | OUTPATIENT
Start: 2025-06-23 | End: 2025-07-07

## 2025-06-23 RX ORDER — DOXYCYCLINE 100 MG/1
100 CAPSULE ORAL 2 TIMES DAILY
Qty: 28 CAPSULE | Refills: 0 | Status: SHIPPED | OUTPATIENT
Start: 2025-06-23 | End: 2025-07-07

## 2025-06-23 RX ORDER — CHLORHEXIDINE GLUCONATE 40 MG/ML
SOLUTION TOPICAL ONCE
Qty: 118 ML | Refills: 0 | Status: SHIPPED | OUTPATIENT
Start: 2025-06-23 | End: 2025-06-23

## 2025-06-23 RX ORDER — ACETAMINOPHEN 500 MG
1000 TABLET ORAL EVERY 8 HOURS
Qty: 84 TABLET | Refills: 0 | Status: SHIPPED | OUTPATIENT
Start: 2025-06-23 | End: 2025-07-07

## 2025-06-23 NOTE — PROGRESS NOTES
PLASTIC SURGERY PRE-OP CLINIC NOTE  Date: 6/24/25  Subjective :  Patient ID: Tori Taylor  is a 44 y.o. female is here for pre-op appointment     Planned Date of Procedure: 7/28/25  Planned Surgical Procedure: Right Tissue Expander Removal with Right Implant Reconstruction; Left Mastopexy    HPI:   Tori Taylor is a 44 y.o. female is here for pre-operative appointment for the above procedure(s).     Currently, the patient states there were no changes in their medications or medical history.     Patient's vitals are Visit Vitals  /79   Pulse 51    today.     Patient is not currently on an ACE, ARB, or Diuretic.     Patient has Good Rx Card.     Patient is not on chronic NSAIDs.       MEDICATIONS  Current Medications[1]     REVIEW OF SYSTEMS:    Constitutional: negative for fevers, chills, unintentional weight loss  HEENT: negative for changes in vision, headaches, changes in hearing, congestion, sore throat  Cardiovascular: negative for chest pain, palpitations  Respiratory: negative for cough, shortness of breath  Gastrointestinal: negative for nausea, vomiting, diarrhea  Genitourinary: negative for dysuria, hematuria  Musculoskeletal: negative for joint swelling or pain  Skin: negative for rashes or lesions  Psych: negative for depression, anxiety  Endocrine: negative for polyuria, polydipsia, cold/heat intolerance  Hem/Lymph: negative for bleeding disorder    PHYSICAL EXAM  General: alert and oriented, no apparent distress  Psych: normal mood and affect, judgement intact.   Eyes: No conjunctival erythema, extraocular movements intact, no scleral icterus, pupils equal and reactive to light  HENT: normocephalic, atraumatic, no rhinorrhea, no trauma to external meatus, no prior surgical scars  CV: hemodynamically stable  Resp: unlabored, no increased work of breathing, equal bilateral chest rise, no cough or stridor  Abdomen: soft, non-tender, non-distended. No surgical scars present. No rashes  noted. No rectus diastasis present   Neuro: Unremarkable without focal findings, AAOx3, CN 2-12 grossly intact/  Extremities/Musculoskeletal: Upper and lower extremities are atraumatic in appearance without tenderness or deformity. No swelling or erythema. Full range of motion is noted to all joints. Steady gait noted.    Skin: Intact, soft and warm; no rashes, lesions, or bruising. No large masses or keloids noted on exam.     Focused exam of the breasts:    LABORATORY  Nutrition  Lab Results   Component Value Date    ALBUMIN 4.5 02/12/2025          ASSESSMENT/PLAN  Tori Taylor is a 44 y.o. female s/p right mastectomy with tissue expander on 4/9/25    The patient will be undergoing : Right Tissue Expander Removal with Right Implant Reconstruction; Left Mastopexy on 7/28/25 at [facility] [place]    She has healed well. She has a 350cc TE with 10 cm width, but because I will need to expand her laterally, will order 13 cm high profile, 475cc - 500cc.     Informed consent discussed with the patient, including: condition, proposed care, treatments and services, alternative forms of treatment, and risks of no treatment.  Details discussed around the procedures to be used, and the risks and hazards involved, potential benefits, and side effects of the patient's proposed care, treatment, and services; the likelihood of the patient achieving his or her goals; and any potential problems that might occur during recuperation. Reasonable alternative also discussed with the patient's proposed care, treatment, and services. The discussion encompasses risks, benefits, and side effects related to the alternative and risks related to not receiving the proposed care, treatment, and services. Written informed consent was obtained.    The patient was counseled to avoid anticoagulation medications and herbals including - but not limited to - ASA, NSAIDs, Plavix, fish oils, and green tea    Patient was counseled to avoid nicotine and areas with  "high amounts of secondhand smoke.     Patient was counseled to increase protein intake after surgery to aid with wound healing with goal of 120g/day.     Patient was counseled on need for compression garments and/or support bras, given information about where to acquire these garments, and instructions to bring with on the day of surgery.     We discussed postoperative follow-up visits, recuperation and return to work.    Advised patient to contact office with any questions or concerns    All findings and diagnosis was discussed with patient at the time of this visit. Patient states they understand and are in agreement with the treatment plan at the time of this visit.     Photos completed 6/3/25    Medications eRx'd:  -Celebrex 200 mg BID with meals- Good Rx card provided to the patient.  -Gabapentin 600 mg Q8H   -Tylenol 1000 mg Q8H  -Doxycycline 100 mg BID for 14 days   -Hibiclens - instructed the patient to wash breast and/or abdomen and margins the night before surgery or the morning of surgery; avoid washing face/eyes     RTC 8/5/25 after surgery          [1]   Current Outpatient Medications:     acetaminophen (Tylenol Extra Strength) 500 mg tablet, Take 2 tablets (1,000 mg) by mouth every 8 hours for 14 days., Disp: 84 tablet, Rfl: 0    albuterol 90 mcg/actuation inhaler, Inhale 2 puffs 4 times a day., Disp: 18 g, Rfl: 3    BD Luer-Juliana Syringe 3 mL 25 gauge x 1\" syringe, USE 1 SYRINGE TWICE WEEKLY FOR B12 INJECTIONS, Disp: 8 each, Rfl: 11    celecoxib (CeleBREX) 200 mg capsule, Take 1 capsule (200 mg) by mouth 2 times a day for 14 days., Disp: 28 capsule, Rfl: 0    cholecalciferol (Vitamin D-3) 5,000 Units tablet, Take 1 tablet (125 mcg) by mouth once daily., Disp: , Rfl:     copper (ParaGard T 380A) 380 square mm IUD, by intrauterine route., Disp: , Rfl:     cyanocobalamin (Vitamin B-12) 1,000 mcg/mL injection, Inject 1 mL (1,000 mcg) into the muscle 2 times a week., Disp: 10 mL, Rfl: 0    doxycycline " "(Vibramycin) 100 mg capsule, Take 1 capsule (100 mg) by mouth 2 times a day for 14 days. Take with at least 8 ounces (large glass) of water, do not lie down for 30 minutes after, Disp: 28 capsule, Rfl: 0    fexofenadine (Allegra) 180 mg tablet, Take 1 tablet (180 mg) by mouth once daily as needed., Disp: , Rfl:     gabapentin (Neurontin) 300 mg capsule, Take 1 capsule (300 mg) by mouth 2 times a day as needed (pain/sleep) for up to 14 days., Disp: 60 capsule, Rfl: 0    gabapentin (Neurontin) 300 mg capsule, Take 2 capsules (600 mg) by mouth every 8 hours for 14 days., Disp: 84 capsule, Rfl: 0    insulin syringe-needle U-100 1/2 mL 30 gauge syringe, , Disp: , Rfl:     insulin syringe-needle U-100 30G X 1/2\" 0.3 mL syringe, Use Once monthly for b 12 shot, Disp: 15 each, Rfl: 1    mometasone (Nasonex) 50 mcg/actuation nasal spray, Administer 1 spray into affected nostril(s) once daily., Disp: , Rfl:     tamoxifen (Nolvadex) 20 mg tablet, Take 1 tablet (20 mg total) by mouth once daily.  Take with water or any other nonalcoholic drink with or without food at around the same time(s) every day., Disp: 90 tablet, Rfl: 1    "

## 2025-06-24 ENCOUNTER — APPOINTMENT (OUTPATIENT)
Dept: PLASTIC SURGERY | Facility: CLINIC | Age: 45
End: 2025-06-24
Payer: COMMERCIAL

## 2025-06-24 VITALS
DIASTOLIC BLOOD PRESSURE: 79 MMHG | SYSTOLIC BLOOD PRESSURE: 120 MMHG | BODY MASS INDEX: 32.65 KG/M2 | WEIGHT: 208 LBS | HEART RATE: 51 BPM | HEIGHT: 67 IN

## 2025-06-24 DIAGNOSIS — G89.18 POST-OP PAIN: ICD-10-CM

## 2025-06-24 DIAGNOSIS — Z17.0 MALIGNANT NEOPLASM OF OVERLAPPING SITES OF RIGHT BREAST IN FEMALE, ESTROGEN RECEPTOR POSITIVE: ICD-10-CM

## 2025-06-24 DIAGNOSIS — C50.811 MALIGNANT NEOPLASM OF OVERLAPPING SITES OF RIGHT BREAST IN FEMALE, ESTROGEN RECEPTOR POSITIVE: ICD-10-CM

## 2025-06-24 PROCEDURE — 99214 OFFICE O/P EST MOD 30 MIN: CPT | Performed by: SURGERY

## 2025-07-08 DIAGNOSIS — Z85.3 PERSONAL HISTORY OF BREAST CANCER: ICD-10-CM

## 2025-07-16 ENCOUNTER — TELEPHONE (OUTPATIENT)
Dept: ADMISSION | Facility: HOSPITAL | Age: 45
End: 2025-07-16
Payer: COMMERCIAL

## 2025-07-16 ENCOUNTER — HOSPITAL ENCOUNTER (OUTPATIENT)
Dept: RADIOLOGY | Facility: CLINIC | Age: 45
Discharge: HOME | End: 2025-07-16
Payer: COMMERCIAL

## 2025-07-16 DIAGNOSIS — R92.8 OTHER ABNORMAL AND INCONCLUSIVE FINDINGS ON DIAGNOSTIC IMAGING OF BREAST: ICD-10-CM

## 2025-07-16 DIAGNOSIS — Z85.3 PERSONAL HISTORY OF BREAST CANCER: ICD-10-CM

## 2025-07-16 PROCEDURE — 76642 ULTRASOUND BREAST LIMITED: CPT | Mod: LT

## 2025-07-16 PROCEDURE — 77061 BREAST TOMOSYNTHESIS UNI: CPT | Mod: LT

## 2025-07-16 PROCEDURE — 76982 USE 1ST TARGET LESION: CPT | Mod: LT

## 2025-07-16 NOTE — TELEPHONE ENCOUNTER
Pt updated to hold tamoxifen until FUV on 7/22.   Pt in agreement with plan, will call with any new/worsening concerns.

## 2025-07-16 NOTE — TELEPHONE ENCOUNTER
Pt is c/o worsening side effects from tamoxifen over the past 2 months.     - Clothing soaking hot flashes at least 5x/day, needs to shower and change clothes.    - Difficulty sleeping   - Rash- mostly on thighs and arms- bumps, itching welts/hives.    - Ongoing joint pain, especially in feet making walking painful   - Wt gain of at least 10 lbs.    - No issues with vision, bowels, urination, dyspnea. No dizziness, unilateral weakness, heart palpitations.     Pt has FUV on 7/22.

## 2025-07-17 ENCOUNTER — TELEPHONE (OUTPATIENT)
Dept: SURGICAL ONCOLOGY | Facility: CLINIC | Age: 45
End: 2025-07-17
Payer: COMMERCIAL

## 2025-07-17 NOTE — TELEPHONE ENCOUNTER
Mg results provided. Cancelled imaging for December as she is proceeding with plastic surgery prior to that. She will follow up with Dr. Maguire in December as planned.

## 2025-07-21 NOTE — PROGRESS NOTES
Patient ID: Tori Taylor is a 44 y.o. female.    The patient presents to clinic today for her history of breast cancer.     Cancer Staging   Malignant melanoma of arm (Multi)  Staging form: Melanoma of the Skin, AJCC 8th Edition  - Clinical stage from 6/26/2020: Stage IV (rcT2a, cN1a, cM1d(0)) - Signed by Wilmer Welch MD on 2/21/2025  - Pathologic stage from 6/16/2024: Stage IIIA (pT2a, pN1a, cM0) - Signed by Wilmer Welch MD on 2/21/2025    Malignant neoplasm of overlapping sites of right breast in female, estrogen receptor positive  Staging form: Breast, AJCC 8th Edition  - Pathologic stage from 4/25/2025: Stage Unknown (pT1a, pNX, cM0, G2, ER+, IA+, HER2-) - Signed by Quynh Maguire MD on 4/25/2025    Diagnostic/Therapeutic History:    -12/5/2024. Bilateral screening mammogram. Left breast negative. Right breast calcifications BI-RADS 0     -12/10/2024. Right breast diagnostic imaging. Extensive indeterminate right breast calcifications are confirmed. Predominantly centered in the lower outer quadrant from the subareolar region to posterior depth. They extend anteriorly into the lower inner quadrant. They span over 10 cm. BI-RADS 4. 2 site biopsy is recommended     -01/02/2025: Bilateral breast MRI. Left breast negative. Right breast extensive area of non-mass enhancement centered in the lower outer quadrant. Non-mass enhancement extends at the subareolar region, far posterior depth. Enhancement begins at 9:00 and extends to 6:00. This spans 11 x 7.1 x 4.3 cm. They correlate to the extent of microcalcifications on mammogram. There is no enhancement of the chest wall or pectoralis muscle. No skin enhancement is appreciated though non-mass enhancement is superficial. Right axilla is not included in the field-of-view but there is no lymphadenopathy     - On 1/9/2025 she had a right breast stereotactic guided core needle biopsy that showed ductal carcinoma in situ, intermediate high-grade, solid cribriform,  papillary and micropapillary pattern with necrosis and calcifications ER: %.     -01/22/2025: Right axillary US negative    - On 4/9/2025 Dr. Bach performed a right breast mastectomy with tissue expander path showing a 3 mm grade 2 invasive ductal carcinoma.  DCIS extent 8.4 cm cribriform, micropapillary, papillary, solid, grade 3 and 7 out of 16 blocks all margins were negative final stage pT1aNx  ER 91 to 100%, IA 21 to 30%, HER2 1+, negative    - Discussed in TB- surgery is complete, do not recommend axillary surgery- follow up with Med/Onc to consider endocrine therapy    - Initiated on tamoxifen 5/2025.     History of Present Illness (HPI)/Interval History:  Ms. Taylor presents today for follow-up, treatment and surveillance. Upon starting tamoxifen she had intense hot flashes, soaking through clothes at least 5 times a day. Difficulty sleeping. Rash on thighs and arms - bumps, itchy like welts/hives. Joint pain in feet. Weight gain of 10 lbs. Since stopping last Wednesday, she has been feeling much better. Some mild discomfort in her feet. Her hot flashes have been better but still there. Describes them as back to her baseline.     Denies any new breast cancer concerns. Having implant placed on 7/28 with left breast mastopexy. Recent left breast mammogram was benign.     PMH: hx of stage 3 melanoma in 2014 s/p local excision of right forearm and ax LND  treated with adjuvant interferon therapy (Mariano Brown). Developed solitary mets to the brain in June 202 treated with SBRT followed by Pembro x 2 years     PMH: detailed hx of melanoma (follows with Dr Welch)     - History of stage III melanoma diagnosed in 2014 s/p local excision of right forearm with partial thickness flap and axillary LND s/p 10 months of adjuvant interferon alpha therapy, stopped due to thyroiditis.  - Solitary brain metastasis left parietal lobe, status post left-sided craniotomy for hemorrhagic tumor resection and duraplasty with  pericranial graft by Dr. Ruth Veliz on  2020.  - Completed adjuvant SBRT therapy post-resection site by Dr. Leticia Mendoza--dates -  - Pending PET/CT for 4 mm solitary RUL pulmonary nodule, sub cm liver lesion, and enlarged left adrenal gland.  - 2020:  Discussion for immunotherapy vs. 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  - 2020: Discussed results of 2020 PET/CT, discuss immunotherapy options after tumor board meeting 2020.  Consent for Pembrolizumab.  - Patient received 5 Pembrolizumab. infusions since 2020.  Hospitalized for confusion episode concerning for focal seizure.  MRI more suggesting of immunotherapy flare rather than progression of disease (missed last 2 doses due to symptoms).  - Plan to continue Keppra and resumed Pembrolizumab 2021  - Restaging scans 2022 with ALMA in brain or PET CT C/A/P  - Completed 2 years of Pembrolizumab, restaging  - Followed with serial imaging.  - 2023: CT C/A/P with an enlarging 1 cm liver lesion, uncertain etiology.  Stable pulmonary nodule.  Cervical cyst for which she will see gynecology.  No changes in MRI brain.  - 23: MRI Liver does not show any pathologic lesion.        Reproductive hx:; Ob/gyn history: Menarche 11 Menopause perimenopausal , age of first delivery 23  + OCPs, no fertility treatments, recent HRT     No family history of breast or ovarian cancer.        Review of Systems:  14-point ROS otherwise negative, as per HPI.    Medical History[1]    Surgical History[2]    Social History[3]    RX Allergies[4]    Current Medications[5]     Objective    BSA: There is no height or weight on file to calculate BSA.  There were no vitals taken for this visit.    ECOG Performance Status:  ECOG performance status: Asymptomatic    Physical Exam  Vitals reviewed.   Constitutional:       General: She is not in acute distress.     Appearance: She is not toxic-appearing.   HENT:      Mouth/Throat:       Mouth: Mucous membranes are moist.      Pharynx: Oropharynx is clear.     Eyes:      General: No scleral icterus.     Extraocular Movements: Extraocular movements intact.      Conjunctiva/sclera: Conjunctivae normal.       Cardiovascular:      Rate and Rhythm: Normal rate and regular rhythm.   Pulmonary:      Effort: Pulmonary effort is normal. No respiratory distress.   Chest:      Comments: Deferred due to recent breast imaging and exam     Musculoskeletal:         General: No swelling or tenderness.     Skin:     General: Skin is warm and dry.      Coloration: Skin is not jaundiced.     Neurological:      General: No focal deficit present.      Mental Status: She is alert and oriented to person, place, and time.     Psychiatric:         Mood and Affect: Mood normal.         Behavior: Behavior normal.         Thought Content: Thought content normal.         Judgment: Judgment normal.         GA: alert, oriented, cooperative  HEENT: anicteric sclera, well injected conjunctiva  Heart: RRR  Lung: CTAB  Abd:+BS, soft, non tender  Ext: PPP, no LLE       Laboratory Data:  Lab Results   Component Value Date    WBC 8.5 04/03/2025    HGB 13.5 04/03/2025    HCT 40.6 04/03/2025    MCV 96 04/03/2025     04/03/2025       Chemistry    Lab Results   Component Value Date/Time     04/03/2025 1529    K 4.1 04/03/2025 1529     04/03/2025 1529    CO2 29 04/03/2025 1529    BUN 11 04/03/2025 1529    CREATININE 0.73 04/03/2025 1529    Lab Results   Component Value Date/Time    CALCIUM 9.5 04/03/2025 1529    ALKPHOS 45 02/12/2025 1556    AST 12 02/12/2025 1556    ALT 9 02/12/2025 1556    BILITOT 0.4 02/12/2025 1556             Radiology:  BI US breast limited left, BI mammo left diagnostic tomosynthesis  Narrative: Interpreted By:  Colby Dumont,   STUDY:  BI MAMMO LEFT DIAGNOSTIC TOMOSYNTHESIS; BI US BREAST LIMITED LEFT;  7/16/2025 3:33 pm; 7/16/2025 3:48 pm      ACCESSION NUMBER(S):  [id number]; [id number]       ORDERING CLINICIAN:  EZEQUIEL JOSEPH      INDICATION:  Annual mammogram. History of right mastectomy.      ,Z85.3 Personal history of malignant neoplasm of breast,R92.8 Other  abnormal and inconclusive findings on diagnostic imaging of breast;  ,R92.8 Other abnormal and inconclusive findings on diagnostic imaging  of breast      COMPARISON:  01/02/2025, 12/05/2024, 06/05/2023      FINDINGS:  MAMMOGRAPHY: 2D and tomosynthesis images were reviewed at 1 mm slice  thickness.      Density:  The breasts are heterogeneously dense, which may obscure  small masses.      An oval circumscribed low-density masses in the central lateral left  breast middle depth. No suspicious masses or calcifications are  identified in the left breast.      ULTRASOUND:  Targeted ultrasound of the left breast was performed by a registered  sonographer with elastography.      At the 3 o'clock position 5 cm from the nipple a benign anechoic  avascular cyst with a thin internal septation is seen measuring 0.2 x  0.5 x 0.3 cm. The cyst is soft on elastography. Findings correspond  to the mammographic mass.      Impression: Benign left breast cyst. No mammographic or targeted sonographic  evidence of malignancy in the left breast.      BI-RADS CATEGORY:      BI-RADS Category:  2 Benign.  Recommendation:  Annual Screening.  Recommended Date:  1 Year.  Laterality:  Left.      For any future breast imaging appointments, please call [phone] ([phone]).          MACRO:  None      Signed by: Colby Dumont 7/16/2025 4:01 PM  Dictation workstation:   [id number]       BI US breast limited right 01/22/2025    Narrative  Interpreted By:  Rola Henriquez,  STUDY:  BI US BREAST LIMITED RIGHT; 1/22/2025 3:35 pm    INDICATION:  Signs/Symptoms:right axilla scan for surgery planning. 44-year-old  female recently diagnosed with DCIS on stereotactic biopsy of right  breast calcifications. Personal history of right arm malignant  melanoma with a right axillary lymph  node metastasis status post  axillary dissection in July 2024.    COMPARISON:  Prior screening mammograms 06/05/2023, 12/05/2024. Right diagnostic  mammogram 12/10/2024. Breast MRI 01/02/2025. Stereotactic biopsy and  postprocedure mammogram 01/09/2025.    ACCESSION NUMBER(S):  [id number]    ORDERING CLINICIAN:  COLE LOO    FINDINGS:  Multiple grayscale ultrasonographic images were obtained through the  right axilla.    No lymphadenopathy, mass or other focal sonographic abnormality is  seen in the right axilla. No right axillary lymph node is seen, in  keeping with history of right axillary lymph node dissection.    Impression  1. No sonographic evidence of malignancy or lymphadenopathy in the  right axilla. Recommend continued follow-up with Breast Surgical  Oncology for appropriate management of known extensive right breast  malignancy.    BI-RADS CATEGORY:    BI-RADS Category:  2 Benign.  Recommendation:  Breast Surgery follow-up for treatment of known  right breast cancer. Recommended Date:  Immediate.  Laterality:  Right.    Signed by: Rola Henriquez 1/22/2025 4:01 PM  Dictation workstation:   [id number]          Assessment/Plan:    44 year old female patient with hx of stage 3 melanoma in 2014 s/p local excision of right forearm and ax LND  treated with adjuvant interferon therapy (Mariano Brown). Developed solitary mets to the brain in June 2020 treated with SBRT followed by Pembro x 2 years (follows with Dr Welch and Dr Musa).She recently diagnosed with right-sided breast cancer presenting to discuss treatment options.  Back in December 2024, she had right breast diagnostic imaging that showed extensive right breast calcifications spanning over 10 cm, further confirmed by breast MRI that showed right breast extensive area of non-mass enhancement centered in the lower outer quadrant, it extends at the subareolar region for posterior depth, spanning over 11 cm.  She had a stereotactic guided core needle  biopsy that showed ductal carcinoma in situ, intermediate high-grade, solid cribriform, papillary and micropapillary pattern with necrosis and calcifications ER: %. Right axilla US negative.     On 4/9/2025 Dr. Bach performed a right breast mastectomy with tissue expander path showing a 3 mm grade 2 invasive ductal carcinoma.  DCIS extent 8.4 cm cribriform, micropapillary, papillary, solid, grade 3 and 7 out of 16 blocks all margins were negative final stage pT1aNx  ER 91 to 100%, AL 21 to 30%, HER2 1+, negative    - Discussed in TB- surgery is complete, do not recommend axillary surgery- follow up with Med/Onc to consider endocrine therapy    Plan:   - Stop tamoxifen for now, will check hormone labs today. If menopausal consider switch to AI. If pre menopausal still will consider trial of 10 mg tamoxifen. Would not consider staring either option until 2 weeks post op. Will touch base in two weeks to discuss plan  - Referral to nutrition and [facility]   - RTC in 4 months        Negrita Balderrama PA-C  Hematology and Medical Oncology  [facility]             [1]   Past Medical History:  Diagnosis Date    Acquired hypothyroidism     Allergic     Allergy-induced asthma (HHS-HCC)     Anemia     Anxiety     Breast cancer 01/2025    Disease of thyroid gland     Ductal carcinoma in situ (DCIS) of right breast     Plan: Right Breast Mastectomy 4/9/2025    H/O echocardiogram 03/23/2021    CONCLUSIONS:  1. The left ventricular systolic function is normal with a 55% estimated ejection fraction.  2. There is mitral stenosis is not seen.  3. Aortic valve stenosis is not present.  4. The main pulmonary artery is normal in size, and position, with normal bifurcation into the left and right pulmonary arteries.    Lymphedema     Melanoma in situ of the skin (Multi)     melanoma diagnosed in 2014 s/p local excision of right forearm with partial thickness flap and axillary LND s/p 10  months  of adjuvant interferon alpha therapy, stopped due to thyroiditis.    Metastasis to brain (Multi)     Solitary brain metastasis left parietal lobe; s/p left-sided craniotomy for hemorrhagic tumor resection and duraplasty with pericranial graft by Dr. Ruth Veliz  on 2020    Obesity     Personal history of irradiation 2020    Pulmonary nodule     4 mm solitary RUL; 2023: CT C/A/P: Stable pulmonary nodule    Seizures (Multi)     2020 focal seizure.  MRI more  suggesting of immunotherapy flare rather than progression of disease (missed last 2 doses due to symptoms). Completed Keppra therapy. TESTING NOT INDICATIVE OF ANY SEIZURE ACTIVITY    Skin cancer 2014    Thyroid nodule     Vitamin B12 deficiency     Vitamin D deficiency    [2]   Past Surgical History:  Procedure Laterality Date    BRAIN SURGERY  2000    BREAST BIOPSY  2025     SECTION, CLASSIC       SECTION, LOW TRANSVERSE  2004    CHOLECYSTECTOMY      CRANIOTOMY Left 2020    LYMPH NODE BIOPSY      LYMPHADENECTOMY      Axillary Lymphadenectomy    MASTECTOMY COMPLETE / SIMPLE Right 2025    Procedure: Right Breast Mastectomy;  Surgeon: Quynh Maguire MD;  Location: [facility];  Service: General Surgery Breast Oncology;  Laterality: Right;    OVARIAN CYST DRAINAGE Left     SENTINEL LYMPH NODE BIOPSY      SKIN CANCER EXCISION      local excision of right forearm with partial thickness flap    SKIN GRAFT      THYROIDECTOMY, PARTIAL      WISDOM TOOTH EXTRACTION     [3]   Social History  Socioeconomic History    Marital status:    Tobacco Use    Smoking status: Former     Current packs/day: 0.00     Types: Cigarettes     Quit date:      Years since quittin.5    Smokeless tobacco: Never    Tobacco comments:     Light social smoker    Vaping Use    Vaping status: Never Used   Substance and Sexual Activity    Alcohol use: Not Currently    Drug use: Never    Sexual activity: Yes     Partners:  "Male     Birth control/protection: I.U.D.   [4]   Allergies  Allergen Reactions    Adhesive Tape-Silicones Hives, Itching and Rash     ABLE TO TOLERATE PAPER TAPE ONLY    Levalbuterol Hcl Anaphylaxis    Codeine Hives    Sulfa (Sulfonamide Antibiotics) Nausea/vomiting   [5]   Current Outpatient Medications:     albuterol 90 mcg/actuation inhaler, Inhale 2 puffs 4 times a day., Disp: 18 g, Rfl: 3    BD Luer-Juliana Syringe 3 mL 25 gauge x 1\" syringe, USE 1 SYRINGE TWICE WEEKLY FOR B12 INJECTIONS, Disp: 8 each, Rfl: 11    cholecalciferol (Vitamin D-3) 5,000 Units tablet, Take 1 tablet (125 mcg) by mouth once daily., Disp: , Rfl:     copper (ParaGard T 380A) 380 square mm IUD, by intrauterine route., Disp: , Rfl:     cyanocobalamin (Vitamin B-12) 1,000 mcg/mL injection, Inject 1 mL (1,000 mcg) into the muscle 2 times a week., Disp: 10 mL, Rfl: 0    fexofenadine (Allegra) 180 mg tablet, Take 1 tablet (180 mg) by mouth once daily as needed., Disp: , Rfl:     gabapentin (Neurontin) 300 mg capsule, Take 1 capsule (300 mg) by mouth 2 times a day as needed (pain/sleep) for up to 14 days., Disp: 60 capsule, Rfl: 0    gabapentin (Neurontin) 300 mg capsule, Take 2 capsules (600 mg) by mouth every 8 hours for 14 days., Disp: 84 capsule, Rfl: 0    insulin syringe-needle U-100 1/2 mL 30 gauge syringe, , Disp: , Rfl:     insulin syringe-needle U-100 30G X 1/2\" 0.3 mL syringe, Use Once monthly for b 12 shot, Disp: 15 each, Rfl: 1    mometasone (Nasonex) 50 mcg/actuation nasal spray, Administer 1 spray into affected nostril(s) once daily., Disp: , Rfl:     tamoxifen (Nolvadex) 20 mg tablet, Take 1 tablet (20 mg total) by mouth once daily.  Take with water or any other nonalcoholic drink with or without food at around the same time(s) every day., Disp: 90 tablet, Rfl: 1    "

## 2025-07-22 ENCOUNTER — LAB (OUTPATIENT)
Dept: LAB | Facility: CLINIC | Age: 45
End: 2025-07-22
Payer: COMMERCIAL

## 2025-07-22 ENCOUNTER — OFFICE VISIT (OUTPATIENT)
Dept: HEMATOLOGY/ONCOLOGY | Facility: CLINIC | Age: 45
End: 2025-07-22
Payer: COMMERCIAL

## 2025-07-22 VITALS
SYSTOLIC BLOOD PRESSURE: 114 MMHG | DIASTOLIC BLOOD PRESSURE: 76 MMHG | OXYGEN SATURATION: 99 % | WEIGHT: 217.3 LBS | TEMPERATURE: 97.9 F | BODY MASS INDEX: 34.03 KG/M2 | HEART RATE: 54 BPM | RESPIRATION RATE: 16 BRPM

## 2025-07-22 DIAGNOSIS — C50.911 MALIGNANT NEOPLASM OF RIGHT BREAST IN FEMALE, ESTROGEN RECEPTOR POSITIVE, UNSPECIFIED SITE OF BREAST: ICD-10-CM

## 2025-07-22 DIAGNOSIS — Z17.0 MALIGNANT NEOPLASM OF RIGHT BREAST IN FEMALE, ESTROGEN RECEPTOR POSITIVE, UNSPECIFIED SITE OF BREAST: ICD-10-CM

## 2025-07-22 DIAGNOSIS — D05.11 DUCTAL CARCINOMA IN SITU (DCIS) OF RIGHT BREAST: ICD-10-CM

## 2025-07-22 DIAGNOSIS — D05.11 DUCTAL CARCINOMA IN SITU (DCIS) OF RIGHT BREAST: Primary | ICD-10-CM

## 2025-07-22 PROCEDURE — 83001 ASSAY OF GONADOTROPIN (FSH): CPT

## 2025-07-22 PROCEDURE — 36415 COLL VENOUS BLD VENIPUNCTURE: CPT

## 2025-07-22 PROCEDURE — 83520 IMMUNOASSAY QUANT NOS NONAB: CPT

## 2025-07-22 PROCEDURE — 82670 ASSAY OF TOTAL ESTRADIOL: CPT

## 2025-07-22 PROCEDURE — 99215 OFFICE O/P EST HI 40 MIN: CPT

## 2025-07-22 ASSESSMENT — PAIN SCALES - GENERAL: PAINLEVEL_OUTOF10: 2

## 2025-07-23 LAB
ESTRADIOL SERPL-MCNC: <19 PG/ML
FSH SERPL-ACNC: 77 IU/L
LH SERPL-ACNC: 34.7 IU/L

## 2025-07-26 LAB — MIS SERPL-MCNC: <0.003 NG/ML

## 2025-07-27 ENCOUNTER — ANESTHESIA EVENT (OUTPATIENT)
Dept: OPERATING ROOM | Facility: HOSPITAL | Age: 45
End: 2025-07-27
Payer: COMMERCIAL

## 2025-07-28 ENCOUNTER — HOSPITAL ENCOUNTER (OUTPATIENT)
Facility: HOSPITAL | Age: 45
Setting detail: OUTPATIENT SURGERY
Discharge: HOME | End: 2025-07-28
Attending: SURGERY | Admitting: SURGERY
Payer: COMMERCIAL

## 2025-07-28 ENCOUNTER — ANESTHESIA (OUTPATIENT)
Dept: OPERATING ROOM | Facility: HOSPITAL | Age: 45
End: 2025-07-28
Payer: COMMERCIAL

## 2025-07-28 VITALS
RESPIRATION RATE: 15 BRPM | WEIGHT: 224.43 LBS | SYSTOLIC BLOOD PRESSURE: 124 MMHG | BODY MASS INDEX: 34.01 KG/M2 | OXYGEN SATURATION: 95 % | DIASTOLIC BLOOD PRESSURE: 75 MMHG | HEIGHT: 68 IN | TEMPERATURE: 97.3 F | HEART RATE: 61 BPM

## 2025-07-28 DIAGNOSIS — D05.11 DUCTAL CARCINOMA IN SITU (DCIS) OF RIGHT BREAST: ICD-10-CM

## 2025-07-28 LAB — PREGNANCY TEST URINE, POC: NEGATIVE

## 2025-07-28 PROCEDURE — A19316 PR SUSPENSION OF BREAST: Performed by: ANESTHESIOLOGIST ASSISTANT

## 2025-07-28 PROCEDURE — 2500000001 HC RX 250 WO HCPCS SELF ADMINISTERED DRUGS (ALT 637 FOR MEDICARE OP): Performed by: PHYSICIAN ASSISTANT

## 2025-07-28 PROCEDURE — 2780000003 HC OR 278 NO HCPCS: Performed by: SURGERY

## 2025-07-28 PROCEDURE — 7100000001 HC RECOVERY ROOM TIME - INITIAL BASE CHARGE: Performed by: SURGERY

## 2025-07-28 PROCEDURE — 3600000009 HC OR TIME - EACH INCREMENTAL 1 MINUTE - PROCEDURE LEVEL FOUR: Performed by: SURGERY

## 2025-07-28 PROCEDURE — 3700000002 HC GENERAL ANESTHESIA TIME - EACH INCREMENTAL 1 MINUTE: Performed by: SURGERY

## 2025-07-28 PROCEDURE — 2720000007 HC OR 272 NO HCPCS: Performed by: SURGERY

## 2025-07-28 PROCEDURE — A4649 SURGICAL SUPPLIES: HCPCS | Performed by: SURGERY

## 2025-07-28 PROCEDURE — 2500000005 HC RX 250 GENERAL PHARMACY W/O HCPCS: Performed by: SURGERY

## 2025-07-28 PROCEDURE — 3700000001 HC GENERAL ANESTHESIA TIME - INITIAL BASE CHARGE: Performed by: SURGERY

## 2025-07-28 PROCEDURE — 2500000001 HC RX 250 WO HCPCS SELF ADMINISTERED DRUGS (ALT 637 FOR MEDICARE OP): Performed by: ANESTHESIOLOGY

## 2025-07-28 PROCEDURE — 2500000004 HC RX 250 GENERAL PHARMACY W/ HCPCS (ALT 636 FOR OP/ED): Performed by: ANESTHESIOLOGIST ASSISTANT

## 2025-07-28 PROCEDURE — 7100000002 HC RECOVERY ROOM TIME - EACH INCREMENTAL 1 MINUTE: Performed by: SURGERY

## 2025-07-28 PROCEDURE — 2500000004 HC RX 250 GENERAL PHARMACY W/ HCPCS (ALT 636 FOR OP/ED): Performed by: SURGERY

## 2025-07-28 PROCEDURE — 88305 TISSUE EXAM BY PATHOLOGIST: CPT | Mod: TC,AHULAB | Performed by: PHYSICIAN ASSISTANT

## 2025-07-28 PROCEDURE — 2500000002 HC RX 250 W HCPCS SELF ADMINISTERED DRUGS (ALT 637 FOR MEDICARE OP, ALT 636 FOR OP/ED): Performed by: PHYSICIAN ASSISTANT

## 2025-07-28 PROCEDURE — 7100000010 HC PHASE TWO TIME - EACH INCREMENTAL 1 MINUTE: Performed by: SURGERY

## 2025-07-28 PROCEDURE — L1789 HC OR 278 NO HCPCS: HCPCS | Performed by: SURGERY

## 2025-07-28 PROCEDURE — 3600000004 HC OR TIME - INITIAL BASE CHARGE - PROCEDURE LEVEL FOUR: Performed by: SURGERY

## 2025-07-28 PROCEDURE — 2500000005 HC RX 250 GENERAL PHARMACY W/O HCPCS: Performed by: ANESTHESIOLOGIST ASSISTANT

## 2025-07-28 PROCEDURE — 19316 MASTOPEXY: CPT | Performed by: SURGERY

## 2025-07-28 PROCEDURE — 19316 MASTOPEXY: CPT | Performed by: PHYSICIAN ASSISTANT

## 2025-07-28 PROCEDURE — C1789 PROSTHESIS, BREAST, IMP: HCPCS | Performed by: SURGERY

## 2025-07-28 PROCEDURE — A19316 PR SUSPENSION OF BREAST: Performed by: ANESTHESIOLOGY

## 2025-07-28 PROCEDURE — 11970 RPLCMT TISS XPNDR PERM IMPLT: CPT | Performed by: SURGERY

## 2025-07-28 PROCEDURE — 7100000009 HC PHASE TWO TIME - INITIAL BASE CHARGE: Performed by: SURGERY

## 2025-07-28 PROCEDURE — 11970 RPLCMT TISS XPNDR PERM IMPLT: CPT | Performed by: PHYSICIAN ASSISTANT

## 2025-07-28 DEVICE — IMPLANTABLE DEVICE: Type: IMPLANTABLE DEVICE | Site: BREAST | Status: FUNCTIONAL

## 2025-07-28 RX ORDER — FENTANYL CITRATE 50 UG/ML
INJECTION, SOLUTION INTRAMUSCULAR; INTRAVENOUS AS NEEDED
Status: DISCONTINUED | OUTPATIENT
Start: 2025-07-28 | End: 2025-07-28

## 2025-07-28 RX ORDER — ONDANSETRON HYDROCHLORIDE 2 MG/ML
INJECTION, SOLUTION INTRAVENOUS AS NEEDED
Status: DISCONTINUED | OUTPATIENT
Start: 2025-07-28 | End: 2025-07-28

## 2025-07-28 RX ORDER — APREPITANT 40 MG/1
40 CAPSULE ORAL ONCE
Status: COMPLETED | OUTPATIENT
Start: 2025-07-28 | End: 2025-07-28

## 2025-07-28 RX ORDER — OXYCODONE HYDROCHLORIDE 5 MG/1
5 TABLET ORAL EVERY 4 HOURS PRN
Status: DISCONTINUED | OUTPATIENT
Start: 2025-07-28 | End: 2025-07-28 | Stop reason: HOSPADM

## 2025-07-28 RX ORDER — MUPIROCIN 20 MG/G
OINTMENT TOPICAL AS NEEDED
Status: DISCONTINUED | OUTPATIENT
Start: 2025-07-28 | End: 2025-07-28 | Stop reason: HOSPADM

## 2025-07-28 RX ORDER — LIDOCAINE HYDROCHLORIDE 40 MG/ML
SOLUTION TOPICAL AS NEEDED
Status: DISCONTINUED | OUTPATIENT
Start: 2025-07-28 | End: 2025-07-28

## 2025-07-28 RX ORDER — DIPHENHYDRAMINE HYDROCHLORIDE 50 MG/ML
INJECTION, SOLUTION INTRAMUSCULAR; INTRAVENOUS AS NEEDED
Status: DISCONTINUED | OUTPATIENT
Start: 2025-07-28 | End: 2025-07-28

## 2025-07-28 RX ORDER — HYDRALAZINE HYDROCHLORIDE 20 MG/ML
5 INJECTION INTRAMUSCULAR; INTRAVENOUS EVERY 30 MIN PRN
Status: DISCONTINUED | OUTPATIENT
Start: 2025-07-28 | End: 2025-07-28 | Stop reason: HOSPADM

## 2025-07-28 RX ORDER — LABETALOL HYDROCHLORIDE 5 MG/ML
5 INJECTION, SOLUTION INTRAVENOUS ONCE AS NEEDED
Status: DISCONTINUED | OUTPATIENT
Start: 2025-07-28 | End: 2025-07-28 | Stop reason: HOSPADM

## 2025-07-28 RX ORDER — ONDANSETRON HYDROCHLORIDE 2 MG/ML
4 INJECTION, SOLUTION INTRAVENOUS ONCE AS NEEDED
Status: DISCONTINUED | OUTPATIENT
Start: 2025-07-28 | End: 2025-07-28 | Stop reason: HOSPADM

## 2025-07-28 RX ORDER — GABAPENTIN 300 MG/1
600 CAPSULE ORAL ONCE
Status: COMPLETED | OUTPATIENT
Start: 2025-07-28 | End: 2025-07-28

## 2025-07-28 RX ORDER — SODIUM CHLORIDE, SODIUM LACTATE, POTASSIUM CHLORIDE, CALCIUM CHLORIDE 600; 310; 30; 20 MG/100ML; MG/100ML; MG/100ML; MG/100ML
100 INJECTION, SOLUTION INTRAVENOUS CONTINUOUS
Status: DISCONTINUED | OUTPATIENT
Start: 2025-07-28 | End: 2025-07-28 | Stop reason: HOSPADM

## 2025-07-28 RX ORDER — LIDOCAINE HYDROCHLORIDE 20 MG/ML
INJECTION, SOLUTION EPIDURAL; INFILTRATION; INTRACAUDAL; PERINEURAL AS NEEDED
Status: DISCONTINUED | OUTPATIENT
Start: 2025-07-28 | End: 2025-07-28

## 2025-07-28 RX ORDER — KETOROLAC TROMETHAMINE 30 MG/ML
INJECTION, SOLUTION INTRAMUSCULAR; INTRAVENOUS AS NEEDED
Status: DISCONTINUED | OUTPATIENT
Start: 2025-07-28 | End: 2025-07-28

## 2025-07-28 RX ORDER — ROCURONIUM BROMIDE 10 MG/ML
INJECTION, SOLUTION INTRAVENOUS AS NEEDED
Status: DISCONTINUED | OUTPATIENT
Start: 2025-07-28 | End: 2025-07-28

## 2025-07-28 RX ORDER — MIDAZOLAM HYDROCHLORIDE 2 MG/2ML
INJECTION, SOLUTION INTRAMUSCULAR; INTRAVENOUS AS NEEDED
Status: DISCONTINUED | OUTPATIENT
Start: 2025-07-28 | End: 2025-07-28

## 2025-07-28 RX ORDER — CEFAZOLIN 1 G/1
INJECTION, POWDER, FOR SOLUTION INTRAVENOUS AS NEEDED
Status: DISCONTINUED | OUTPATIENT
Start: 2025-07-28 | End: 2025-07-28

## 2025-07-28 RX ORDER — LIDOCAINE HYDROCHLORIDE 10 MG/ML
0.1 INJECTION, SOLUTION EPIDURAL; INFILTRATION; INTRACAUDAL; PERINEURAL ONCE
Status: DISCONTINUED | OUTPATIENT
Start: 2025-07-28 | End: 2025-07-28 | Stop reason: HOSPADM

## 2025-07-28 RX ORDER — SODIUM CHLORIDE 0.9 G/100ML
INJECTION, SOLUTION IRRIGATION AS NEEDED
Status: DISCONTINUED | OUTPATIENT
Start: 2025-07-28 | End: 2025-07-28 | Stop reason: HOSPADM

## 2025-07-28 RX ORDER — PROPOFOL 10 MG/ML
INJECTION, EMULSION INTRAVENOUS AS NEEDED
Status: DISCONTINUED | OUTPATIENT
Start: 2025-07-28 | End: 2025-07-28

## 2025-07-28 RX ORDER — ACETAMINOPHEN 325 MG/1
975 TABLET ORAL ONCE
Status: COMPLETED | OUTPATIENT
Start: 2025-07-28 | End: 2025-07-28

## 2025-07-28 RX ADMIN — Medication: at 10:24

## 2025-07-28 RX ADMIN — SODIUM CHLORIDE, SODIUM LACTATE, POTASSIUM CHLORIDE, AND CALCIUM CHLORIDE: .6; .31; .03; .02 INJECTION, SOLUTION INTRAVENOUS at 07:38

## 2025-07-28 RX ADMIN — FENTANYL CITRATE 50 MCG: 50 INJECTION, SOLUTION INTRAMUSCULAR; INTRAVENOUS at 10:06

## 2025-07-28 RX ADMIN — OXYCODONE HYDROCHLORIDE 5 MG: 5 TABLET ORAL at 10:37

## 2025-07-28 RX ADMIN — FENTANYL CITRATE 50 MCG: 50 INJECTION, SOLUTION INTRAMUSCULAR; INTRAVENOUS at 10:22

## 2025-07-28 RX ADMIN — MIDAZOLAM HYDROCHLORIDE 2 MG: 1 INJECTION, SOLUTION INTRAMUSCULAR; INTRAVENOUS at 07:38

## 2025-07-28 RX ADMIN — LIDOCAINE HYDROCHLORIDE 4 ML: 40 SOLUTION TOPICAL at 07:47

## 2025-07-28 RX ADMIN — LIDOCAINE HYDROCHLORIDE 100 MG: 20 INJECTION, SOLUTION EPIDURAL; INFILTRATION; INTRACAUDAL; PERINEURAL at 07:44

## 2025-07-28 RX ADMIN — CEFAZOLIN 2 G: 1 INJECTION, POWDER, FOR SOLUTION INTRAMUSCULAR; INTRAVENOUS at 07:44

## 2025-07-28 RX ADMIN — GABAPENTIN 600 MG: 300 CAPSULE ORAL at 07:18

## 2025-07-28 RX ADMIN — PROPOFOL 25 MCG/KG/MIN: 10 INJECTION, EMULSION INTRAVENOUS at 07:50

## 2025-07-28 RX ADMIN — FENTANYL CITRATE 50 MCG: 50 INJECTION, SOLUTION INTRAMUSCULAR; INTRAVENOUS at 08:23

## 2025-07-28 RX ADMIN — APREPITANT 40 MG: 40 CAPSULE ORAL at 07:18

## 2025-07-28 RX ADMIN — ROCURONIUM BROMIDE 70 MG: 10 INJECTION, SOLUTION INTRAVENOUS at 07:44

## 2025-07-28 RX ADMIN — CARBOXYMETHYLCELLULOSE SODIUM 2 DROP: 5 SOLUTION/ DROPS OPHTHALMIC at 07:44

## 2025-07-28 RX ADMIN — KETOROLAC TROMETHAMINE 30 MG: 30 INJECTION, SOLUTION INTRAMUSCULAR at 09:33

## 2025-07-28 RX ADMIN — FENTANYL CITRATE 50 MCG: 50 INJECTION, SOLUTION INTRAMUSCULAR; INTRAVENOUS at 07:44

## 2025-07-28 RX ADMIN — SUGAMMADEX 200 MG: 100 INJECTION, SOLUTION INTRAVENOUS at 10:14

## 2025-07-28 RX ADMIN — ONDANSETRON 4 MG: 2 INJECTION, SOLUTION INTRAMUSCULAR; INTRAVENOUS at 09:33

## 2025-07-28 RX ADMIN — PROPOFOL 200 MG: 10 INJECTION, EMULSION INTRAVENOUS at 07:44

## 2025-07-28 RX ADMIN — ACETAMINOPHEN 975 MG: 325 TABLET ORAL at 07:18

## 2025-07-28 RX ADMIN — DIPHENHYDRAMINE HYDROCHLORIDE 25 MG: 50 INJECTION, SOLUTION INTRAMUSCULAR; INTRAVENOUS at 07:44

## 2025-07-28 RX ADMIN — DEXAMETHASONE SODIUM PHOSPHATE 8 MG: 4 INJECTION, SOLUTION INTRAMUSCULAR; INTRAVENOUS at 07:44

## 2025-07-28 ASSESSMENT — PAIN - FUNCTIONAL ASSESSMENT
PAIN_FUNCTIONAL_ASSESSMENT: 0-10

## 2025-07-28 ASSESSMENT — PAIN DESCRIPTION - DESCRIPTORS
DESCRIPTORS: BURNING

## 2025-07-28 ASSESSMENT — PAIN SCALES - GENERAL
PAINLEVEL_OUTOF10: 1
PAINLEVEL_OUTOF10: 0 - NO PAIN
PAINLEVEL_OUTOF10: 1
PAINLEVEL_OUTOF10: 1
PAINLEVEL_OUTOF10: 0 - NO PAIN
PAINLEVEL_OUTOF10: 1

## 2025-07-28 ASSESSMENT — PAIN DESCRIPTION - ORIENTATION: ORIENTATION: LEFT;RIGHT

## 2025-07-28 NOTE — ANESTHESIA PREPROCEDURE EVALUATION
Patient: Tori Taylor    Procedure Information       Anesthesia Start Date/Time: 07/28/25 0738    Procedures:       Right Breast Tissue Expander Insertion Removal/ Replacement (Right)      Right Breast Capsulotomy (Right)      Left Breast Mastopexy (Left)    Location: [facility] / [facility]    Surgeons: Barbara Lira MD          45 yo F hx breast CA with recent breast surgery 4/2025 without issues.  Other issues include prior malignant melanoma c/b brain met s/p crani, ?seizure x1 (but pt says workup showed it may not have actually been a seizure), hypothyroid, controlled asthma.  Neg HCG    Relevant Problems   Pulmonary   (+) Mild asthma (HHS-HCC)      Neuro   (+) Anxiety      Liver   (+) Hepatomegaly, not elsewhere classified   (+) Liver disease      Endocrine   (+) Hypothyroidism   (+) Nontoxic single thyroid nodule      Hematology   (+) Anemia      GYN   (+) Leiomyoma of uterus   (+) Malignant neoplasm of overlapping sites of right breast in female, estrogen receptor positive       Clinical information reviewed:   Tobacco  Allergies  Meds   Med Hx  Surg Hx  OB Status  Fam Hx  Soc   Hx        NPO Detail:  NPO/Void Status  Carbohydrate Drink Given Prior to Surgery? : N  Date of Last Liquid: 07/28/25  Time of Last Liquid: 0400  Date of Last Solid: 07/27/25  Time of Last Solid: 2200  Last Intake Type: Clear fluids  Time of Last Void: 0713         Physical Exam    Airway  Mallampati: III  TM distance: >3 FB  Neck ROM: full  Mouth opening: 3 or more finger widths     Cardiovascular Rate: normal     Dental - normal exam     Pulmonary - normal exam   Abdominal            Anesthesia Plan    History of general anesthesia?: yes  History of complications of general anesthesia?: no    ASA 3     general     intravenous induction   Postoperative pain plan includes opioids.  Anesthetic plan and risks discussed with patient.

## 2025-07-28 NOTE — ANESTHESIA PROCEDURE NOTES
Peripheral IV  Date/Time: 7/28/2025 7:48 AM  Inserted by: NANDA Soria    Placement  Needle size: 20 G  Laterality: left  Location: wrist  Local anesthetic: none  Site prep: alcohol  Technique: anatomical landmarks  Attempts: 1

## 2025-07-28 NOTE — BRIEF OP NOTE
Date: 2025  OR Location: [facility] OR    Name: Tori Taylor, : 1980, Age: 44 y.o., MRN: [id number], Sex: female    Diagnosis  Pre-op Diagnosis      * Ductal carcinoma in situ (DCIS) of right breast [D05.11] Post-op Diagnosis     * Ductal carcinoma in situ (DCIS) of right breast [D05.11]     Procedures  Right Breast Tissue Expander Insertion Removal/ Replacement  41699 - MS REPLACEMENT TISSUE EXPANDER W/PERMANENT IMPLANT    Left Breast Mastopexy  19356 - MS MASTOPEXY      Surgeons      * Barbara Lira - Primary    Resident/Fellow/Other Assistant:  Surgeons and Role:     * Neeta Fonseca PA-C - Assisting    Staff:   Surgical Assistant:   Alida: Cuauhtemoc Crooksub Person: Shruthi Crooksub Person: Maya Zeng Circulator: Mary Grace Zeng Scrub: Lety    Anesthesia Staff: Anesthesiologist: Dave Dozier MD  C-AA: NANDA Cruz; NANDA Soria    Procedure Summary  Anesthesia: General  ASA: III  Estimated Blood Loss: 20 mL  Intra-op Medications:   Administrations occurring from 0715 to 1145 on 25:   Medication Name Total Dose   sodium chloride 0.9 % irrigation solution 2,000 mL   BUPivacaine-EPINEPHrine (Marcaine w/EPI) 54 mL in sodium chloride 0.9% 120 mL syringe 174 mL   mupirocin (Bactroban) 2 % ointment 1 Application   acetaminophen (Tylenol) tablet 975 mg 975 mg   aprepitant (Emend) capsule 40 mg 40 mg   gabapentin (Neurontin) capsule 600 mg 600 mg   ceFAZolin (Ancef) vial 1 g 2 g   dexAMETHasone (Decadron) 4 mg/mL IV Syringe 2 mL 8 mg   diphenhydrAMINE (BENADryl) injection 25 mg   fentaNYL (Sublimaze) injection 50 mcg/mL 200 mcg   ketorolac (Toradol) injection 30 mg 30 mg   LR bolus Cannot be calculated   lidocaine (LTA Kit) for intubation 4 mL   lidocaine PF (Xylocaine-MPF) local injection 2 % 100 mg   lubricating eye drops ophthalmic solution 2 drop   midazolam PF (Versed) injection 1 mg/mL 2 mg   ondansetron (Zofran) 2 mg/mL injection 4 mg   propofol (Diprivan)  injection 10 mg/mL 457.05 mg   rocuronium (ZeMuron) 50 mg/5 mL injection 70 mg   sugammadex (Bridion) 200 mg/2 mL injection 200 mg              Anesthesia Record               Intraprocedure I/O Totals          Intake    LR bolus 800.00 mL    Total Intake 800 mL       Output    Urine 0 mL    Est. Blood Loss 20 mL    Total Output 20 mL       Net    Net Volume 780 mL          Specimen:   ID Type Source Tests Collected by Time   1 : Left Breast Tissue Tissue BREAST REDUCTION MAMMOPLASTY LEFT SURGICAL PATHOLOGY EXAM Barbara Lira MD 7/28/2025 0925                  Findings: see full operative report    Complications:  None; patient tolerated the procedure well.     Disposition: PACU - hemodynamically stable.  Condition: stable  Specimens Collected:   ID Type Source Tests Collected by Time   1 : Left Breast Tissue Tissue BREAST REDUCTION MAMMOPLASTY LEFT SURGICAL PATHOLOGY EXAM Barbara Lira MD 7/28/2025 0925     Attending Attestation: A qualified resident physician was not available.    SHAHLA Soto  Phone Number: [phone]

## 2025-07-28 NOTE — OP NOTE
Right Breast Tissue Expander Insertion Removal/ Replacement (R), Left Breast Mastopexy (L) Operative Note     Date: 2025  OR Location: [facility]    Name: Tori Taylor, : 1980, Age: 44 y.o., MRN: [id number], Sex: female    Diagnosis  Pre-op Diagnosis      * Ductal carcinoma in situ (DCIS) of right breast [D05.11] Post-op Diagnosis     * Ductal carcinoma in situ (DCIS) of right breast [D05.11]     Procedures  Right Breast Tissue Expander Insertion Removal/ Replacement  30897 - [place] REPLACEMENT TISSUE EXPANDER W/PERMANENT IMPLANT    Left Breast Mastopexy  17765 - IA MASTOPEXY      Surgeons      * Barbara Lira - Primary    Resident/Fellow/Other Assistant:  Surgeons and Role:     * Neeta Fonseca PA-C - Assisting    There was no skilled surgical resident assistance available.  The Surgical Assistant was necessary to assist due to the nature of the case and difficulty.  Specifically, the YENIFER was assisted with soft tissue handling, retraction, hemostasis and closure in order to successfully perform and complete the procedure.    Staff:   Surgical Assistant:   Alida: Cuauhtemoc Crooksub Person: Shruthi  Scrub Person: Maya Zeng Circulator: Mary Grace Zeng Scrub: Lety    Anesthesia Staff: Anesthesiologist: Dave Dozier MD  C-AA: NANDA Cruz; NANDA Soria    Procedure Summary  Anesthesia: General  ASA: III  Estimated Blood Loss: 20 mL  Intra-op Medications:   Administrations occurring from 0715 to 1145 on 25:   Medication Name Total Dose   sodium chloride 0.9 % irrigation solution 2,000 mL   BUPivacaine-EPINEPHrine (Marcaine w/EPI) 54 mL in sodium chloride 0.9% 120 mL syringe 174 mL   mupirocin (Bactroban) 2 % ointment 1 Application   oxyCODONE (Roxicodone) immediate release tablet 5 mg 5 mg   acetaminophen (Tylenol) tablet 975 mg 975 mg   aprepitant (Emend) capsule 40 mg 40 mg   gabapentin (Neurontin) capsule 600 mg 600 mg   ceFAZolin (Ancef) vial 1 g 2 g    dexAMETHasone (Decadron) 4 mg/mL IV Syringe 2 mL 8 mg   diphenhydrAMINE (BENADryl) injection 25 mg   fentaNYL (Sublimaze) injection 50 mcg/mL 200 mcg   ketorolac (Toradol) injection 30 mg 30 mg   LR bolus Cannot be calculated   lidocaine (LTA Kit) for intubation 4 mL   lidocaine PF (Xylocaine-MPF) local injection 2 % 100 mg   lubricating eye drops ophthalmic solution 2 drop   midazolam PF (Versed) injection 1 mg/mL 2 mg   ondansetron (Zofran) 2 mg/mL injection 4 mg   propofol (Diprivan) injection 10 mg/mL 457.05 mg   rocuronium (ZeMuron) 50 mg/5 mL injection 70 mg   sugammadex (Bridion) 200 mg/2 mL injection 200 mg              Anesthesia Record               Intraprocedure I/O Totals          Intake    LR bolus 800.00 mL    Total Intake 800 mL       Output    Urine 0 mL    Est. Blood Loss 20 mL    Total Output 20 mL       Net    Net Volume 780 mL          Specimen:   ID Type Source Tests Collected by Time   1 : Left Breast Tissue 18 g Tissue BREAST MAMMOPLASTY LEFT SURGICAL PATHOLOGY EXAM Barbara Lira MD 7/28/2025 0925         Implants:  Implants       Type Name Action Serial No.      Breast Implant [place] Sm[employer] Round High Profile Silicone Implant 475 cc Implanted [id number]      Indications: Tori Taylor is an 44 y.o. female who is having surgery for Ductal carcinoma in situ (DCIS) of right breast [D05.11].  She has a history of  ma right stectomy with a prepectoral tissue expander with adjacent tissue rearrangement.  She is now ready for her second stage of her reconstruction which include replacement of her tissue expander with permanent implant.    The patient was seen in the preoperative area. The risks, benefits, complications, treatment options, non-operative alternatives, expected recovery and outcomes were discussed with the patient. The risks of the procedure were discussed with her prior to surgery.  These include but are not limited to the risks of anesthesia, infection, bleeding, pain, need  for further procedures, hematoma, seroma, wound healing complications, and asymmetry.  The possibilities of reaction to medication, pulmonary aspiration, injury to surrounding structures, bleeding, recurrent infection, the need for additional procedures, failure to diagnose a condition, and creating a complication requiring transfusion or operation were discussed with the patient. The patient concurred with the proposed plan, giving informed consent.  The site of surgery was properly noted/marked if necessary per policy. The patient has been actively warmed in preoperative area. Preoperative antibiotics have been ordered and given within 1 hours of incision. Venous thrombosis prophylaxis have been ordered including bilateral sequential compression devices    Procedure Details: The patient was identified and marked in the upright and standing position.  She was taken to the operative room and placed in the supine position.  A preoperative time was performed identifying the patient, procedeure and laterality.  An atraumatic general anesthesia was in initiated with an LMA by the anesthesia team.  Her arms were padded and carefully secured to the arm boards, abducted less than 90 degrees. She was prepped and draped in the usual sterile fashion.     First attention was turned to the left breast to get a sense of the volume after the mastopexy.  Using a combination of 2-0 nylon and staples, I tailor tacked the left breast in a modified vertical mastopexy.  Once I established her transverse with I then established her vertical height by imbricating along the inframammary fold.  This brought her point of maximal projection to the central portion of the breast mound and matched her expanded right breast nicely.  The inferior lateral portion of her breast had more ptosis than the medial pole and I marked a small tail in the inferior lateral pole that I plan to excise in order to make the inferior medial and lateral poles of  the breasts more symmetric.  After I was done tailor tacking the breast I marked the new nipple areolar complex with a 42 mm cookie cutter.  Assessing the overall volume I decided to use a 475 cc high-profile sizer.    I turned my attention to the right breast.  The medial inframammary fold incision was made once more.  The following dissection was carried down through the subcutaneous tissue to the capsule which was then opened the expander was then evacuated of its saline and removed from the pocket.  The footprint of the right breast did not appear to need any modification and the capsule was pliable.  I placed the 475 cc high-profile sizer into the pocket and tailor tacked the incision closed.  I set the patient up once more and was happy with the symmetry.      I then completed the left mastopexy.  I reinforced my marks and released all of my tailor tacking sutures and staples.  I marked the nipple areolar complex with a 42 mm cookie cutter.  I completely de-epithelialized all of the skin that I had marked.  I released the dermis full-thickness to create a superior pedicle and advanced the nipple areolar complex superiorly.  I then released full-thickness dermis to create modified medial and lateral pillars which I then neutralized by imbricating the tissue in between with interrupted 2-0 Vicryl's.  The lateral inferior tissue that I had previously marked was excised and sent to pathology.  This weighed 18 g.  The vertical height was 7 cm.  The incisions were closed in multiple layers with interrupted buried 2-0 Vicryl's to modulate the soft tissue followed by interrupted buried 3-0 Monocryl followed by a running 4-0 Monocryl subcuticular.      Going back to the right breast, I irrigated the pocket with normal saline and performed hemostasis.  54 cc of 0.5% Marcaine with epinephrine was diluted with 120 cc of normal saline and was used to place bilateral chest wall blocks for postoperative pain control.  I  irrigated the pocket with Irrisept.  I then exchanged gloves and prepped the right side with Irrisept and sterile blue towels.   I opened up a 475 cc [employer] Hand smooth round high profile implant and bathed it with Irrisept.  I used a Alanis funnel to place it into the pocket on the right side.     I excised the previous right mastectomy scar sharply.  I then closed the incision with 3-0 Monocryl in the parenchyma as well as placed as interrupted buried dermals followed by running 4-0 Monocryl subcuticular.  The incision was dressed with mupirocin, xeroform, kerlix, ABD's, and a 6 inch ortho ACE wrap.  The patient was then woken, extubated and taken to the recovery room in good condition.    Evidence of Infection: No   Complications:  None; patient tolerated the procedure well.    Disposition: PACU - hemodynamically stable.  Condition: stable     Attending Attestation: I performed the procedure.     Barbara Lira  Phone Number: [phone]

## 2025-07-28 NOTE — ANESTHESIA PROCEDURE NOTES
Airway  Date/Time: 7/28/2025 7:47 AM  Reason: elective    Airway not difficult    Staffing  Performed: NANDA   Authorized by: Dave Dozier MD    Performed by: NANDA Soria  Patient location during procedure: OR    Patient Condition  Indications for airway management: airway protection and anesthesia  Patient position: sniffing  MILS maintained throughout  Sedation level: deep     Final Airway Details   Preoxygenated: yes  Final airway type: endotracheal airway  Successful airway: ETT  Cuffed: yes   Successful intubation technique: direct laryngoscopy  Adjuncts used in placement: intubating stylet  Endotracheal tube insertion site: oral  Blade: Reji  Blade size: #3  ETT size (mm): 7.5  Cormack-Lehane Classification: grade I - full view of glottis  Placement verified by: chest auscultation and capnometry   Measured from: lips  ETT to lips (cm): 22  Number of attempts at approach: 1  Number of other approaches attempted: 0    Additional Comments  Easy grade I view. Teeth and lips in pre-op condition. LTA used. Done by AA-S1.

## 2025-07-28 NOTE — DISCHARGE INSTRUCTIONS
Plastic Surgery Post Discharge Instructions  Activity:  Avoiding strenuous activity is critical to your recovery during the immediate post operative period. No pushing, pulling or lifting objects greater than 5 pounds with bilateral arms.  Avoid raising the arms above the level of the shoulder. No cooking, cleaning or driving until cleared by Dr. Lira. No yard work. Try to get up and ambulate short distances in your house once an hour every hour throughout the day to reduce swelling and the risk of postoperative blood clots. Avoid walking further distances until cleared at your post operative visit.     No showering. You may sponge bath following discharge but do not get your surgical dressings wet. Once your surgical dressings are removed, avoid soaking or submerging surgical incisions/sites until they are fully healed.    Surgical Site/Wound Care:  Do not remove your surgical dressings including the ace wrap. They will be removed at your first postop appointment. Keep your dressings clean and dry.      Nutrition:  You may resume a regular diet following surgery. Ensure that you are drinking an adequate amount of fluids to maintain hydration.     It is essential to ensure you are eating adequate protein to promote wound healing. We recommend 120g of protein daily.     Medication Instructions:  You may resume use of your home prescribed medications as previously directed following discharge from the hospital. If you were taking medications prior to your surgery and they are not listed on your discharge homegoing instructions medications list, consult your MD before you resume these medications.    Please take your prescribed medications as instructed during your pre-operative visit. If you have any questions regarding your post op medications, please contact our Care Coordinator, Marina [phone], Option 6).     Remember when taking Acetaminophen, do NOT exceed more than 1000 milligrams (mg) per dose or more  than 4000 mg total per day. Taking too much Acetaminophen at one time can damage your liver. Call your MD if you have any questions about your medications. To prevent constipation while taking narcotic pain medications, please utilize your prescribed bowel regimen, ensure that you drink plenty of water, eat fiber rich foods (a good source is fruit) and increase activity progressively.    Call Physician If:  Contact the plastic surgery office for any questions and/or concerns regarding the surgical incision/site.  1. [phone], Option 6 if Monday-Friday (8 a.m. - 4:30 p.m.)  2. [phone] and ask for the Plastic Surgery team on call provider if after hours or on weekends    Call your MD or seek immediate medical attention if you experience any of the following symptoms:  1. Fever of 101.5 (38.5 C) or greater  2. Pain not controlled with prescribed pain medications  3. Uncontrolled nausea and/or vomiting  4. Drainage or swelling around your incisions and/or surgical sites   5. Separation of incisions, or tearing of the incision line  6. Large fluid collection under or around the incision or flap sites   7. Flap discoloration (including darkened appearance)  8. Difficulty breathing  9. Swelling, pain, heat and/or redness in your legs and/or calves  10. Inability to tolerate diet/fluid intake      Follow-up/Post Discharge Appointments:  Follow-up care is a key part of your treatment and safety. It is very important that you maintain follow-up care as directed so that your surgical site heals properly and does not lead to problems. Always carry a current medication list with you and bring it to ALL healthcare Provider visits. Be sure to maintain follow up with plastic surgery at your scheduled appointment. If you are unable to keep your appointment, or need to reschedule please contact our office at [phone].     You are scheduled for follow-up on: 8/5/2025 . Please bring your pink surgical bra to your  follow-up appointment.

## 2025-07-28 NOTE — PERIOPERATIVE NURSING NOTE
1024 Assuming care of pt at this time. Bedside report received from outgoing RN. Pt arrived to pac[facility] at this time, report received from OR and anesthesia staff. Phase 1 care started. Message sent to family via text at this time.   1030 see changes   1045 no changes   1100 no changes, Pt meets discharge criteria from phase 1 at this time

## 2025-07-28 NOTE — H&P
History Of Present Illness  Tori Taylor is a 44 y.o. female who had right mastectomy on 4/9/2025 who presents for Right Tissue Expander Removal with Right Implant Reconstruction and Left Mastopexy. She denies changes in health since prior visit. Denies fevers, chills, CP, SOB, palpitations, N/V/D, abd pain.      Past Medical History  Medical History[1]    Surgical History  Surgical History[2]     Social History  She reports that she quit smoking about 13 years ago. Her smoking use included cigarettes. She has never used smokeless tobacco. She reports current alcohol use. She reports that she does not use drugs.    Family History  Family History[3]     Allergies  Adhesive tape-silicones, Levalbuterol hcl, Codeine, and Sulfa (sulfonamide antibiotics)    Review of Systems   ROS: All 10 systems were reviewed and are unremarkable except for those mentioned in HPI.    Physical Exam   General: alert and oriented, no apparent distress  Psych: normal mood and affect, judgement intact.   Eyes: No conjunctival erythema, extraocular movements intact, no scleral icterus, pupils equal and reactive to light  HENT: normocephalic, atraumatic, no rhinorrhea, no trauma to external meatus, no prior surgical scars  CV: hemodynamically stable  Resp: unlabored, no increased work of breathing, equal bilateral chest rise, no cough or stridor  Abdomen: soft, non-tender, non-distended. No surgical scars present. No rashes noted. No rectus diastasis present   Neuro: Unremarkable without focal findings, AAOx3, CN 2-12 grossly intact/  Extremities/Musculoskeletal: Upper and lower extremities are atraumatic in appearance without tenderness or deformity. No swelling or erythema. Full range of motion is noted to all joints. Steady gait noted.    Skin: Intact, soft and warm; no rashes, lesions, or bruising. No large masses or keloids noted on exam.     Focused exam of breasts:  Right: Right TE intact, incision well healed. Breast flaps  viable and well perfused.    Last Recorded Vitals  There were no vitals taken for this visit.    Relevant Results  No results found. However, due to the size of the patient record, not all encounters were searched. Please check Results Review for a complete set of results.    Assessment & Plan  Ductal carcinoma in situ (DCIS) of right breast    Tori Taylor is a 44 y.o. female who had right mastectomy on 4/9/2025 who presents for Right Tissue Expander Removal with Right Implant Reconstruction and Left Mastopexy. She denies changes in health since prior visit.      Proceed with scheduled procedure. All questions answered to patient's satisfaction.    Neeta Fonseca PA-C         [1]   Past Medical History:  Diagnosis Date    Acquired hypothyroidism     Allergic     Allergy-induced asthma (HHS-HCC)     Anemia ?    Anxiety     Breast cancer 01/2025    Disease of thyroid gland     Ductal carcinoma in situ (DCIS) of right breast     Plan: Right Breast Mastectomy 4/9/2025    H/O echocardiogram 03/23/2021    CONCLUSIONS:  1. The left ventricular systolic function is normal with a 55% estimated ejection fraction.  2. There is mitral stenosis is not seen.  3. Aortic valve stenosis is not present.  4. The main pulmonary artery is normal in size, and position, with normal bifurcation into the left and right pulmonary arteries.    Lymphedema     Melanoma in situ of the skin (Multi)     melanoma diagnosed in 2014 s/p local excision of right forearm with partial thickness flap and axillary LND s/p 10 months  of adjuvant interferon alpha therapy, stopped due to thyroiditis.    Metastasis to brain (Multi)     Solitary brain metastasis left parietal lobe; s/p left-sided craniotomy for hemorrhagic tumor resection and duraplasty with pericranial graft by Dr. Ruth Veliz  on 6/26/2020    Obesity     Personal history of irradiation 6/2020    Pulmonary nodule     4 mm solitary RUL; 7/2023: CT C/A/P: Stable pulmonary nodule     Seizures (Multi)     2020 focal seizure.  MRI more  suggesting of immunotherapy flare rather than progression of disease (missed last 2 doses due to symptoms). Completed Keppra therapy. TESTING NOT INDICATIVE OF ANY SEIZURE ACTIVITY    Skin cancer 2014    Thyroid nodule     Vitamin B12 deficiency     Vitamin D deficiency    [2]   Past Surgical History:  Procedure Laterality Date    BRAIN SURGERY  2000    BREAST BIOPSY  2025     SECTION, CLASSIC       SECTION, LOW TRANSVERSE  2004    CHOLECYSTECTOMY  16 years ago?    CRANIOTOMY Left 2020    LYMPH NODE BIOPSY      LYMPHADENECTOMY      Axillary Lymphadenectomy    MASTECTOMY COMPLETE / SIMPLE Right 2025    Procedure: Right Breast Mastectomy;  Surgeon: Quynh Maguire MD;  Location: [facility];  Service: General Surgery Breast Oncology;  Laterality: Right;    OVARIAN CYST DRAINAGE Left     SENTINEL LYMPH NODE BIOPSY      SKIN CANCER EXCISION      local excision of right forearm with partial thickness flap    SKIN GRAFT      THYROIDECTOMY, PARTIAL      WISDOM TOOTH EXTRACTION     [3]   Family History  Problem Relation Name Age of Onset    Deep vein thrombosis Mother Kendra Lane 40    Other (benign breast tumor) Mother Kendra Lane     Blood clot Mother Kendra Lane     Diabetes Father Obed Azevedo     No Known Problems Brother      Asthma Son Ryland     Hypertension Maternal Grandmother Willow Rizzo ()     Breast cancer Other          paternal aunt 60'sand cousin 46    Hypertension Maternal Grandmother Willow Rizzo ()     Alzheimer's disease Maternal Grandmother Willow Rizzo     Alzheimer's disease Maternal Grandfather Vicente Rizzo     Alzheimer's disease Paternal Grandmother Kristi Amaya

## 2025-07-28 NOTE — PROGRESS NOTES
"       PLASTIC SURGERY CLINIC VISIT  POSTOP BREAST RECONSTRUCTION     Date: 8/5/25  Date of Surgery: 7/28/25  Surgical Procedure: Right TE Removal/Exchange for Implant; Left Mastopexy for Symmetry         HPI:   Tori Taylor 44 y.o. female is here for post-operative appointment for the above procedure(s).      Interval changes as of this date:   8/5 Doing well overall, pain well controlled on current regimen, adhering to activity restrictions.        MEDICATIONS  Current Medications[1]      OBJECTIVE [x]Expand by Default  Blood pressure 112/78, pulse 66, height 1.727 m (5' 8\"), weight 102 kg (225 lb).       REVIEW OF SYSTEMS:    Constitutional: negative for fevers, chills, unintentional weight loss  HEENT: negative for changes in vision, headaches, changes in hearing, congestion, sore throat  Cardiovascular: negative for chest pain, palpitations  Respiratory: negative for cough, shortness of breath  Gastrointestinal: negative for nausea, vomiting, diarrhea  Genitourinary: negative for dysuria, hematuria  Musculoskeletal: negative for joint swelling or pain  Skin: negative for rashes or lesions  Psych: negative for depression, anxiety  Endocrine: negative for polyuria, polydipsia, cold/heat intolerance  Hem/Lymph: negative for bleeding disorder     PHYSICAL EXAM  General: alert and oriented, no apparent distress    Focused exam of the breasts:  Incisions healing well on both breasts.  Some soft tissue edema on the left     ASSESSMENT/PLAN  Tori Taylor 44 y.o. female who had Right TE Removal/Exchange for Implant; Left Mastopexy for Symmetry on 7/28/25 who presents for POV.    Dermabond and Steristrips placed.  May shower    RTC 8/19    Attending Attestation:  Barbara LEROY MD, personal performed the history, exam, and decision making on this patient.         [1] No current facility-administered medications for this visit.  No current outpatient medications on file.    Facility-Administered " Medications Ordered in Other Visits:     ceFAZolin (Ancef) injection, , intravenous, PRN, Trevor Del Real, CAA, 2 g at 07/28/25 0744    dexAMETHasone (Decadron) injection, , intravenous, PRN, Trevor Dyeete, CAA, 8 mg at 07/28/25 0744    diphenhydrAMINE (BENADryl) injection, , intravenous, PRN, Trevor Dyeete, CAA, 25 mg at 07/28/25 0744    fentaNYL PF (Sublimaze) injection, , intravenous, PRN, Trevor Dyeete, CAA, 50 mcg at 07/28/25 1022    hydrALAZINE (Apresoline) injection 5 mg, 5 mg, intravenous, q30 min PRN, Dave Dozier MD    HYDROmorphone (Dilaudid) injection 0.2 mg, 0.2 mg, intravenous, q5 min PRN, Dave Dozier MD    HYDROmorphone (Dilaudid) injection 0.5 mg, 0.5 mg, intravenous, q5 min PRN, Dave Dozier MD    ketorolac (Toradol) injection, , intravenous, PRN, Trevor Del Real, CAA, 30 mg at 07/28/25 0933    labetaloL (Normodyne,Trandate) injection 5 mg, 5 mg, intravenous, Once PRN, Dave Dozier MD    lactated Ringer's bolus, , intravenous, Continuous PRN, Trevor Del Real CAA, New Bag at 07/28/25 0738    lactated Ringer's infusion, 100 mL/hr, intravenous, Continuous, Dave Dozier MD    lidocaine HCl (LTA Kit) 4 % vial, , intratracheal, PRN, Trevor Del Real, CAA, 4 mL at 07/28/25 0747    lidocaine PF (Xylocaine) 10 mg/mL (1 %) injection 1 mg, 0.1 mL, subcutaneous, Once, Dave Dozier MD    lidocaine PF (Xylocaine) 20 mg/mL (2 %) injection, , intravenous, PRN, Trevor Dyeete, CAA, 100 mg at 07/28/25 0744    lubricating eye drops ophthalmic solution, , Both Eyes, PRN, Trevor Del Real, CAA, 2 drop at 07/28/25 0744    midazolam (Versed) injection, , intravenous, PRN, NANDA Soria, 2 mg at 07/28/25 0738    ondansetron (Zofran) injection 4 mg, 4 mg, intravenous, Once PRN, Dave Dozier MD    ondansetron (Zofran) injection, , intravenous, PRN, NANDA Soria, 4 mg at 07/28/25 0933    oxyCODONE (Roxicodone) immediate release tablet 5 mg, 5 mg, oral, q4h PRN, Dave STEEL  MD Tasia    oxygen (O2) therapy, , inhalation, Continuous - O2/gases, Dave Dozier MD    propofol (Diprivan) injection, , intravenous, PRN, NANDA Soria, 25 mcg/kg/min at 07/28/25 0750    rocuronium (ZeMuron) injection, , intravenous, PRN, NANDA Soria, 70 mg at 07/28/25 0744    sugammadex (Bridion) injection, , intravenous, PRN, NANDA Soria, 200 mg at 07/28/25 1014

## 2025-07-28 NOTE — ANESTHESIA POSTPROCEDURE EVALUATION
Patient: Tori Taylor    Procedure Summary       Date: 07/28/25 Room / Location: [facility] / [facility]    Anesthesia Start: 0738 Anesthesia Stop: 1030    Procedures:       Right Breast Tissue Expander Insertion Removal/ Replacement (Right)      Left Breast Mastopexy (Left) Diagnosis:       Ductal carcinoma in situ (DCIS) of right breast      (Ductal carcinoma in situ (DCIS) of right breast [D05.11])    Surgeons: Barbara Lira MD Responsible Provider: Dave Dozier MD    Anesthesia Type: general ASA Status: 3            Anesthesia Type: general    Vitals Value Taken Time   /77 07/28/25 11:00   Temp 36 °C (96.8 °F) 07/28/25 11:00   Pulse 66 07/28/25 11:00   Resp 16 07/28/25 11:00   SpO2 98 % 07/28/25 11:00       Anesthesia Post Evaluation    Patient participation: complete - patient participated  Level of consciousness: awake  Pain management: satisfactory to patient  Airway patency: patent  Cardiovascular status: acceptable and hemodynamically stable  Respiratory status: acceptable and nonlabored ventilation  Hydration status: balanced  Postoperative Nausea and Vomiting: none        No notable events documented.

## 2025-07-29 ENCOUNTER — TELEPHONE (OUTPATIENT)
Dept: PREOP | Facility: HOSPITAL | Age: 45
End: 2025-07-29

## 2025-07-29 ENCOUNTER — EDUCATION (OUTPATIENT)
Dept: HEMATOLOGY/ONCOLOGY | Facility: CLINIC | Age: 45
End: 2025-07-29
Payer: COMMERCIAL

## 2025-07-29 ASSESSMENT — PAIN SCALES - GENERAL: PAINLEVEL_OUTOF10: 0 - NO PAIN

## 2025-08-04 LAB
LABORATORY COMMENT REPORT: NORMAL
PATH REPORT.FINAL DX SPEC: NORMAL
PATH REPORT.GROSS SPEC: NORMAL
PATH REPORT.RELEVANT HX SPEC: NORMAL
PATH REPORT.TOTAL CANCER: NORMAL

## 2025-08-04 NOTE — PROGRESS NOTES
Patient ID: Tori Taylor is a 44 y.o. female.  Referring Physician: No referring provider defined for this encounter.  Primary Care Provider: Silvia Sanon MD    CANCER HISTORY:   No ref. provider found   For:  [Associated problem not found]   Integrative Oncology History:  - 1/9/2025 right breast stereotactic guided core needle biopsy that showed ductal carcinoma in situ, intermediate high-grade, solid cribriform, papillary and micropapillary pattern with necrosis and calcifications ER: %.   -01/22/2025: Right axillary US negative     - On 4/9/2025 Dr. Bach performed a right breast mastectomy with tissue expander path showing a 3 mm grade 2 invasive ductal carcinoma.  DCIS extent 8.4 cm cribriform, micropapillary, papillary, solid, grade 3 and 7 out of 16 blocks all margins were negative final stage pT1aNx  ER 91 to 100%, MO 21 to 30%, HER2 1+, negative  - Discussed in TB- surgery is complete, do not recommend axillary surgery- follow up with Med/Onc to consider endocrine therapy     - Initiated on tamoxifen 5/2025.     - 7/28/25 - Right Breast Tissue Expander Insertion Removal/ Replacement     INTEGRATIVE HISTORY:  Hot flashes - stopped tamoxifen, due to see oncology on 8/12 to determine adjuvant therapy   Insomnia r/t hot flashes   Joint pain - resolving since stopping tamoxifen   Weight gain - despite watching what she eats. Recent surgery with now limited mobility   Fatigue - r/t sleep disturbance     Nutrition:  Breakfast: eating at 10-11 AM. Yogurt   Lunch: usually skips   Dinner: eating around 5 PM. Grilled pork chops, asparagus, rice, salad   Snack(s): Nutri-grain bar, granola bar, meal replacement shake   Soda and sugar: No soda. Sweets periodically   Eating out: 0-1x/week      Food Intake:   Food Intolerances/Allergies: sensitivity to broccoli after cholecystectomy   Dietary restrictions or preferences: none     Caffeine: coffee - with creamer, 2 cups/day   Water: 48-64 oz/day       Physical Activity:  Current Activity Level: sedentary   Barriers to Activity: recent surgery and surgical restrictions   Goals: once medically cleared,  activity level and reduce weight     Restorative Sleep:  Sleep Duration and Quality: issues staying asleep r/t hot flashes. Sleeping 6-7 hours/night, not feeling refreshed   Sleep Environment: sleeping in bed   Sleep Hygiene: none   Sleep Goals: more restful and consistent sleep      Stress Management:  Stress Level: 2.5/10   Stress Triggers: hot flashes, children   Stress Management Techniques: furniture refinishing, yard work   Stress Goals: none      Substance Use:  Tobacco Use: past use, quit 15 years ago  Alcohol Use: social, couple times a year   Other Substance Use: denies      Social Support: children, , family, close friends     Patient's Goals:   Addressing hot flashes        Supplements:   Vitamin B12 1000 mcg injection twice weekly  Vitamin D3 5000 units       ROS:  no ha, visual symptoms, hearing loss  no sob, chest pain, palp  ROS o/w non contributory, please see HPI    Objective    BSA: There is no height or weight on file to calculate BSA.  There were no vitals taken for this visit.    PHYSICAL EXAM:  NAD, awake/alert  HEENT, NCAT, OP clear, no oral lesions  CTA bilat  RRR no mgr  Abd soft/nt/nd+bs  No c/c/e/ttp  Motor/sensory intact, CN 2-12 intact     RESULTS:  Lab Results   Component Value Date    WBC 8.5 04/03/2025    HGB 13.5 04/03/2025    HCT 40.6 04/03/2025     04/03/2025    CHOL 158 08/15/2024    TRIG 105 08/15/2024    HDL 43.4 08/15/2024    ALT 9 02/12/2025    AST 12 02/12/2025     04/03/2025    K 4.1 04/03/2025     04/03/2025    CREATININE 0.73 04/03/2025    BUN 11 04/03/2025    CO2 29 04/03/2025    TSH 2.31 08/15/2024    INR 0.9 04/20/2021    HGBA1C 5.2 08/15/2024     Lab Results   Component Value Date    [id number] 08/15/2024         Labs 7/22/25:  Estradiol: < 19  FSH: 77  LH: 34.7  AMH: <  0.003      CT chest/abd/pelvis 5/20/25:  IMPRESSION:  1. Postsurgical changes status post right mastectomy with implant  placement with postinflammatory change and edema along the  surrounding subcutaneous tissues. No right axillary lymphadenopathy  demonstrated. Area of nodular density as measured on the prior CT in  the right axilla superiorly is not included on this exam. Follow up  as clinically indicated      2. Otherwise, stable CT chest, abdomen, and pelvis.    Assessment/Plan   Cancer Staging   Malignant melanoma of arm (Multi)  Staging form: Melanoma of the Skin, AJCC 8th Edition  - Clinical stage from 6/26/2020: Stage IV (rcT2a, cN1a, cM1d(0)) - Signed by Wilmer Welch MD on 2/21/2025  - Pathologic stage from 6/16/2024: Stage IIIA (pT2a, pN1a, cM0) - Signed by Wilmer Welch MD on 2/21/2025    Malignant neoplasm of overlapping sites of right breast in female, estrogen receptor positive  Staging form: Breast, AJCC 8th Edition  - Pathologic stage from 4/25/2025: Stage Unknown (pT1a, pNX, cM0, G2, ER+, TX+, HER2-) - Signed by Quynh Maguire MD on 4/25/2025      CANCER SPECIFIC RECCS:      SYMPTOMS Experienced:  Hot flashes  Weight gain     Plan:  Recommend acupuncture for hot flashes; call your insurance to determine if this is covered. If not, we offer group acupuncture at $42/session that is an out-of-pocket cost. Recommend weekly for 4-8 weeks depending on availability and logistics. Call the office to schedule: [phone]  Recommend utilizing nutritional resources through [facility] and the Anticancer Lifestyle Program - there's also information regarding intermittent fasting  Recommend tracking food in an miko such as [employer] to get information on daily intake and look at macronutrients (protein, fats, carbs)   Recommend HEALTH tracks for stress management - see handout to sign up  Defer to surgeon for medical clearance of physical activity   Supplements to consider - will be added to your  "Fullscript account  Fish oil - for metabolic support - please hold off on starting until cleared by surgeon   Probiotic - for gut microbiome support  Vitamin D3 + vitamin K2 - vitamin K2 is helpful for increased absorption of vitamin D  Magnesium glycinate for sleep   Follow up in 4 months   See below for lifestyle guidelines, as well as handouts provided in today's visit     Recommendations:  Diet:  General Guidelines:  The goal of an anti-inflammatory diet is to increase more nutrient dense, whole foods with reduction in processed/packaged foods. It is also important to reduce alcohol, juice, soda, and other sugar-sweetened beverages. Keep in mind, food changes occur through small steps to have a lasting success.     Recommend 25 to 30 grams fiber per day, with 6 to 8 grams from soluble fiber.  - Include a wide variety of vegetables, beans, lentils, berries, emelyn seeds, and flax seeds.  - Soluble fiber sources: black beans, kidney beans, [place] sprouts, sweet potatoes, broccoli, turnips, carrots, avocado, pears, apricots, nectarines, apples, flax seeds, sunflower seeds, hazelnuts, oats.     2. Recommend 5-9 servings of vegetables and 1-2 servings of fruits per day. Incorporate the idea of \"Eat The [place]\" when it comes to fruits and vegetables - these ingredients include phytonutrients that are essential to supporting a healthy gut microbiome, fiber to support healthy blood sugar and inflammation balance, and can supplement key vitamins and minerals to our diet.    3. Recommend at least 30 grams of protein with each meal.   Protein shakes to consider:  - Orgain protein powder  - OWYN protein shakes    4. Add in fermented foods: raw sauerkraut, raw brined pickles, yogurt, kefir, kombucha (low sugar)     5. Drink plenty of water and anti-inflammatory herbal teas (turmeric, brandie, peppermint, chamomile). Recommend omega 3 either through food (SMASH fish, emelyn seed, ground flax seed, walnuts) or supplement.  "     -----------------------------------  Gut Health and Nutrition Tips:   1.Reduce exposure to gut disruptors:   Processed/packaged foods high in refined carbohydrates, artificial sweeteners, processed oils, thickeners, emulsifiers, sodium, artificial dyes, etc.   Too much caffeine, alcohol, and sugar  Stress and poor sleep quality   Sedentary lifestyle   NSAIDs (Ibuprofen, Advil, Aleve)   Frequent antibiotic use     2.Reduce intake of inflammatory oils high in omega-6 fatty acids:   Corn, sunflower, safflower, soybean, canola, grapeseed  Preferred oils are olive or avocado    3. Reduce intake of inflammatory food groups:  Dairy, gluten, processed meat, refined carbohydrates, fried foods, and sugary foods and drinks -- these are highly inflammatory, and processed meat can act as a group 1a carcinogen    -------------------------------------  How to Incorporate Diet Changes:  Consider using pre-made meals, delivered to your home to offer more variety in your diet.  Recommended companies: Unrefined [employer], Factor, [employer], [employer], [employer], [employer], [employer].  All of these options offer vegetarian/vegan meal options as well.    Favorite websites for healthy recipes:  Miriam’s Pantry  Nom Nom Paleo  Real Simple Good  Paleo Running Mama  Minimalist Romano  Cooking Matters  Budget Bytes  [place] Over Knives  Nourishing Meals  Against All Grains  Eating Bird Food    3. Refer to handouts regarding Food As Medicine diet plan. This includes recipes and grocery lists.     Stress management:     Common Mindfulness Practices:  Mindful eating  Mindful breathing  Mindful movement - MINDBODY miko to see  [employer] offerings  Yoga  Lorenzo Chi  Qi Gong  Expressive therapies  HEALTH tracks  Music and/or art therapy group sessions (handout provided)  Music therapy consult   Intentional time in nature  Gratitude  Journaling  Meditation  Click here for  [employer] Free Guided Meditations  Apps: Calm, Insight  Timer   Body scan  Mantra meditation  Guided Imagery      Breathing Techniques:  4.7.8 Breath- inhale through the nose for 4 seconds, hold for 7 seconds, exhale through pursed lips for 8; repeat 4 breath cycles, 3-5x daily   Square breathing: Visualize all 4 sides of a square for equal breaths in and out  Mindful breathing: Focus on your breath without changing the way you breath. Next, inhale and exhale through the nose, with the mouth closed  Nostril breathing: Breathe through one nostril at a time. Manually close right nostril and inhale through left. Close left, exhale, then inhale through right. Close right, exhale then inhale through left.       Acupuncture  Offered in group or individual sessions  For more information -- Acupuncture at [facility]       Integrative Therapies:  Acupuncture ($42/session group acupuncture rate)  Massage  REIKI (Free) - at [place]     For Further Information:  Reading: Anticancer Living, Cancer Fighting Kitchen  Websites: Cancerchoices.org, Anticancer Lifestyle Program  Erik: ONCIO  Podcast: Integrative Oncology Talk      Support: [facility] (exercise programs, dietitian, support groups, financial support and services)  [employer] - passport for integrative services including vegetables/virtual wellness    Follow up: Integrative Oncology 4 months    Thank you for consulting Integrative Oncology  I spent 60 minutes in direct patient care during this clinical encounter with the patient    DAPHNE Akbar-CNP

## 2025-08-05 ENCOUNTER — APPOINTMENT (OUTPATIENT)
Dept: PLASTIC SURGERY | Facility: CLINIC | Age: 45
End: 2025-08-05
Payer: COMMERCIAL

## 2025-08-05 ENCOUNTER — APPOINTMENT (OUTPATIENT)
Dept: HEMATOLOGY/ONCOLOGY | Facility: CLINIC | Age: 45
End: 2025-08-05
Payer: COMMERCIAL

## 2025-08-05 VITALS
HEART RATE: 66 BPM | DIASTOLIC BLOOD PRESSURE: 78 MMHG | WEIGHT: 225 LBS | SYSTOLIC BLOOD PRESSURE: 112 MMHG | HEIGHT: 68 IN | BODY MASS INDEX: 34.1 KG/M2

## 2025-08-05 DIAGNOSIS — D05.11 DUCTAL CARCINOMA IN SITU (DCIS) OF RIGHT BREAST: Primary | ICD-10-CM

## 2025-08-05 PROCEDURE — 3008F BODY MASS INDEX DOCD: CPT | Performed by: SURGERY

## 2025-08-05 PROCEDURE — 99024 POSTOP FOLLOW-UP VISIT: CPT | Performed by: SURGERY

## 2025-08-05 SDOH — SOCIAL STABILITY: SOCIAL NETWORK: I HAVE TROUBLE DOING ALL OF THE FAMILY ACTIVITIES THAT I WANT TO DO: NEVER

## 2025-08-05 SDOH — ECONOMIC STABILITY: FOOD INSECURITY: WITHIN THE PAST 12 MONTHS, THE FOOD YOU BOUGHT JUST DIDN'T LAST AND YOU DIDN'T HAVE MONEY TO GET MORE.: NEVER TRUE

## 2025-08-05 SDOH — SOCIAL STABILITY: SOCIAL NETWORK: I HAVE TROUBLE DOING ALL OF THE ACTIVITIES WITH FRIENDS THAT I WANT TO DO: NEVER

## 2025-08-05 SDOH — SOCIAL STABILITY: SOCIAL NETWORK: PROMIS ABILITY TO PARTICIPATE IN SOCIAL ROLES & ACTIVITIES T-SCORE: 64

## 2025-08-05 SDOH — ECONOMIC STABILITY: FOOD INSECURITY: WITHIN THE PAST 12 MONTHS, YOU WORRIED THAT YOUR FOOD WOULD RUN OUT BEFORE YOU GOT MONEY TO BUY MORE.: NEVER TRUE

## 2025-08-05 SDOH — SOCIAL STABILITY: SOCIAL NETWORK

## 2025-08-05 SDOH — SOCIAL STABILITY: SOCIAL NETWORK: I HAVE TROUBLE DOING ALL OF MY USUAL WORK (INCLUDE WORK AT HOME): NEVER

## 2025-08-05 SDOH — SOCIAL STABILITY: SOCIAL NETWORK: I HAVE TROUBLE DOING ALL OF MY REGULAR LEISURE ACTIVITIES WITH OTHERS: NEVER

## 2025-08-05 ASSESSMENT — PROMIS GLOBAL HEALTH SCALE
RATE_MENTAL_HEALTH: VERY GOOD
RATE_QUALITY_OF_LIFE: VERY GOOD
CARRYOUT_PHYSICAL_ACTIVITIES: MODERATELY
RATE_PHYSICAL_HEALTH: GOOD
RATE_AVERAGE_FATIGUE: MILD
RATE_GENERAL_HEALTH: GOOD
EMOTIONAL_PROBLEMS: NEVER
RATE_AVERAGE_PAIN: 2
CARRYOUT_SOCIAL_ACTIVITIES: GOOD
RATE_SOCIAL_SATISFACTION: VERY GOOD

## 2025-08-05 ASSESSMENT — ANXIETY QUESTIONNAIRES
PAST MONTH HOW OFTEN HAVE YOU FELT CONFIDENT ABOUT YOUR ABILITY TO HANDLE YOUR PROBLEMS: 4 - VERY OFTEN
PAST MONTH HOW OFTEN HAVE YOU FELT THAT YOU WERE UNABLE TO CONTROL THE IMPORTANT THINGS IN YOUR LIFE: 0 - NEVER
PAST MONTH HOW OFTEN HAVE YOU FELT THAT THINGS WERE GOING YOUR WAY: 3 - FAIRLY OFTEN
PAST MONTH HOW OFTEN HAVE YOU FELT CONFIDENT ABOUT YOUR ABILITY TO HANDLE YOUR PROBLEMS: 4 - VERY OFTEN
PAST MONTH HOW OFTEN HAVE YOU FELT DIFFICULTIES WERE PILING UP SO HIGH THAT YOU COULD NOT OVERCOME THEM: 1 - ALMOST NEVER
PAST MONTH HOW OFTEN HAVE YOU FELT THAT YOU WERE UNABLE TO CONTROL THE IMPORTANT THINGS IN YOUR LIFE: 0 - NEVER

## 2025-08-05 ASSESSMENT — PAIN SCALES - GENERAL: PAINLEVEL_OUTOF10: 0-NO PAIN

## 2025-08-06 ENCOUNTER — APPOINTMENT (OUTPATIENT)
Dept: INTEGRATIVE MEDICINE | Facility: CLINIC | Age: 45
End: 2025-08-06
Payer: COMMERCIAL

## 2025-08-06 DIAGNOSIS — R23.2 HOT FLASHES: Primary | ICD-10-CM

## 2025-08-06 PROCEDURE — 99205 OFFICE O/P NEW HI 60 MIN: CPT

## 2025-08-06 NOTE — PATIENT INSTRUCTIONS
Plan:  Recommend acupuncture for hot flashes; call your insurance to determine if this is covered. If not, we offer group acupuncture at $42/session that is an out-of-pocket cost. Recommend weekly for 4-8 weeks depending on availability and logistics. Call the office to schedule: [phone]  Recommend utilizing nutritional resources through [facility] and the Anticancer Lifestyle Program - there's also information regarding intermittent fasting  Recommend tracking food in an miko such as [employer] to get information on daily intake and look at macronutrients (protein, fats, carbs)   Recommend HEALTH tracks for stress management - see handout to sign up  Defer to surgeon for medical clearance of physical activity   Supplements to consider - will be added to your Fullscript account  Fish oil - for metabolic support - please hold off on starting until cleared by surgeon   Probiotic - for gut microbiome support  Vitamin D3 + vitamin K2 - vitamin K2 is helpful for increased absorption of vitamin D  Magnesium glycinate for sleep   Follow up in 4 months   See below for lifestyle guidelines, as well as handouts provided in today's visit     Recommendations:  Diet:  General Guidelines:  The goal of an anti-inflammatory diet is to increase more nutrient dense, whole foods with reduction in processed/packaged foods. It is also important to reduce alcohol, juice, soda, and other sugar-sweetened beverages. Keep in mind, food changes occur through small steps to have a lasting success.     Recommend 25 to 30 grams fiber per day, with 6 to 8 grams from soluble fiber.  - Include a wide variety of vegetables, beans, lentils, berries, emelyn seeds, and flax seeds.  - Soluble fiber sources: black beans, kidney beans, [place] sprouts, sweet potatoes, broccoli, turnips, carrots, avocado, pears, apricots, nectarines, apples, flax seeds, sunflower seeds, hazelnuts, oats.     2. Recommend 5-9 servings of vegetables and 1-2  "servings of fruits per day. Incorporate the idea of \"Eat The [place]\" when it comes to fruits and vegetables - these ingredients include phytonutrients that are essential to supporting a healthy gut microbiome, fiber to support healthy blood sugar and inflammation balance, and can supplement key vitamins and minerals to our diet.    3. Recommend at least 30 grams of protein with each meal.   Protein shakes to consider:  - Orgain protein powder  - OWYN protein shakes    4. Add in fermented foods: raw sauerkraut, raw brined pickles, yogurt, kefir, kombucha (low sugar)     5. Drink plenty of water and anti-inflammatory herbal teas (turmeric, brandie, peppermint, chamomile). Recommend omega 3 either through food (SMASH fish, emelyn seed, ground flax seed, walnuts) or supplement.      -----------------------------------  Gut Health and Nutrition Tips:   1.Reduce exposure to gut disruptors:   Processed/packaged foods high in refined carbohydrates, artificial sweeteners, processed oils, thickeners, emulsifiers, sodium, artificial dyes, etc.   Too much caffeine, alcohol, and sugar  Stress and poor sleep quality   Sedentary lifestyle   NSAIDs (Ibuprofen, Advil, Aleve)   Frequent antibiotic use     2.Reduce intake of inflammatory oils high in omega-6 fatty acids:   Corn, sunflower, safflower, soybean, canola, grapeseed  Preferred oils are olive or avocado    3. Reduce intake of inflammatory food groups:  Dairy, gluten, processed meat, refined carbohydrates, fried foods, and sugary foods and drinks -- these are highly inflammatory, and processed meat can act as a group 1a carcinogen    -------------------------------------  How to Incorporate Diet Changes:  Consider using pre-made meals, delivered to your home to offer more variety in your diet.  Recommended companies: Unrefined TIGRE, Factor, Tr[employer], [employer], codetag, BlueYield, ipnexus.  All of these options offer vegetarian/vegan meal options as well.    Favorite " websites for healthy recipes:  Miriam’s Pantry  Nom Nom Paleo  Real Simple Good  Paleo Running Mama  Minimalist Romano  Cooking Matters  Budget Bytes  [place] Over [employer]ives  Nourishing Meals  Against All Grains  Eating Bird Food    3. Refer to handouts regarding Food As Medicine diet plan. This includes recipes and grocery lists.     Stress management:     Common Mindfulness Practices:  Mindful eating  Mindful breathing  Mindful movement - MINDBODY erik to see [facility] offerings  Yoga  Lorenzo Chi  Qi Gong  Expressive therapies  HEALTH tracks  Music and/or art therapy group sessions (handout provided)  Music therapy consult   Intentional time in nature  Gratitude  Journaling  Meditation  Click here for [facility] Free Guided Meditations  Apps: Calm, Insight Timer   Body scan  Mantra meditation  Guided Imagery      Breathing Techniques:  4.7.8 Breath- inhale through the nose for 4 seconds, hold for 7 seconds, exhale through pursed lips for 8; repeat 4 breath cycles, 3-5x daily   Square breathing: Visualize all 4 sides of a square for equal breaths in and out  Mindful breathing: Focus on your breath without changing the way you breath. Next, inhale and exhale through the nose, with the mouth closed  Nostril breathing: Breathe through one nostril at a time. Manually close right nostril and inhale through left. Close left, exhale, then inhale through right. Close right, exhale then inhale through left.       Acupuncture  Offered in group or individual sessions  For more information -- Acupuncture at [facility]       Integrative Therapies:  Acupuncture ($42/session group acupuncture rate)  Massage  REIKI (Free) - at [place]     For Further Information:  Reading: Anticancer Living, Cancer Fighting Kitchen  Websites: Cancerchoices.org, Anticancer Lifestyle Program  Erik: ONCIO  Podcast: Integrative Oncology Talk      Support: [facility] (exercise programs, dietitian, support groups,  financial support and services)  [employer] - passport for integrative services including vegetables/virtual wellness    Follow up: Integrative Oncology 4 months    Thank you for consulting Integrative Oncology  I spent 60 minutes in direct patient care during this clinical encounter with the patient

## 2025-08-12 ENCOUNTER — APPOINTMENT (OUTPATIENT)
Dept: HEMATOLOGY/ONCOLOGY | Facility: CLINIC | Age: 45
End: 2025-08-12
Payer: COMMERCIAL

## 2025-08-12 ENCOUNTER — OFFICE VISIT (OUTPATIENT)
Dept: HEMATOLOGY/ONCOLOGY | Facility: CLINIC | Age: 45
End: 2025-08-12
Payer: COMMERCIAL

## 2025-08-12 VITALS
DIASTOLIC BLOOD PRESSURE: 60 MMHG | SYSTOLIC BLOOD PRESSURE: 107 MMHG | TEMPERATURE: 97.3 F | BODY MASS INDEX: 35.4 KG/M2 | RESPIRATION RATE: 16 BRPM | WEIGHT: 232.81 LBS | OXYGEN SATURATION: 97 %

## 2025-08-12 DIAGNOSIS — Z78.0 MENOPAUSE: ICD-10-CM

## 2025-08-12 DIAGNOSIS — D05.11 DUCTAL CARCINOMA IN SITU (DCIS) OF RIGHT BREAST: Primary | ICD-10-CM

## 2025-08-12 PROCEDURE — 99214 OFFICE O/P EST MOD 30 MIN: CPT

## 2025-08-12 RX ORDER — ANASTROZOLE 1 MG/1
1 TABLET ORAL DAILY
Qty: 90 TABLET | Refills: 1 | Status: SHIPPED | OUTPATIENT
Start: 2025-08-12

## 2025-08-12 ASSESSMENT — PAIN SCALES - GENERAL: PAINLEVEL_OUTOF10: 0-NO PAIN

## 2025-08-13 VITALS — BODY MASS INDEX: 35.28 KG/M2 | WEIGHT: 232.81 LBS | HEIGHT: 68 IN

## 2025-08-15 ENCOUNTER — ALLIED HEALTH (OUTPATIENT)
Dept: INTEGRATIVE MEDICINE | Facility: CLINIC | Age: 45
End: 2025-08-15

## 2025-08-15 DIAGNOSIS — G89.18 POST-OP PAIN: ICD-10-CM

## 2025-08-15 DIAGNOSIS — C50.412 MALIGNANT NEOPLASM OF UPPER-OUTER QUADRANT OF LEFT BREAST IN FEMALE, ESTROGEN RECEPTOR NEGATIVE: ICD-10-CM

## 2025-08-15 DIAGNOSIS — M25.572 ARTHRALGIA OF BOTH ANKLES: ICD-10-CM

## 2025-08-15 DIAGNOSIS — M25.571 ARTHRALGIA OF BOTH ANKLES: ICD-10-CM

## 2025-08-15 DIAGNOSIS — Z17.1 MALIGNANT NEOPLASM OF UPPER-OUTER QUADRANT OF LEFT BREAST IN FEMALE, ESTROGEN RECEPTOR NEGATIVE: ICD-10-CM

## 2025-08-15 DIAGNOSIS — R23.2 HOT FLASHES: Primary | ICD-10-CM

## 2025-08-15 PROCEDURE — ACUGR GROUP ACUPUNCTURE

## 2025-08-15 RX ORDER — GABAPENTIN 300 MG/1
300 CAPSULE ORAL 2 TIMES DAILY PRN
Qty: 60 CAPSULE | Refills: 2 | Status: SHIPPED | OUTPATIENT
Start: 2025-08-15 | End: 2026-08-15

## 2025-08-18 ENCOUNTER — HOSPITAL ENCOUNTER (OUTPATIENT)
Dept: RADIOLOGY | Facility: CLINIC | Age: 45
Discharge: HOME | End: 2025-08-18
Payer: COMMERCIAL

## 2025-08-18 ENCOUNTER — TELEPHONE (OUTPATIENT)
Dept: PRIMARY CARE | Facility: CLINIC | Age: 45
End: 2025-08-18

## 2025-08-18 ENCOUNTER — RESULTS FOLLOW-UP (OUTPATIENT)
Dept: HEMATOLOGY/ONCOLOGY | Facility: HOSPITAL | Age: 45
End: 2025-08-18
Payer: COMMERCIAL

## 2025-08-18 DIAGNOSIS — M79.672 PAIN IN BOTH FEET: ICD-10-CM

## 2025-08-18 DIAGNOSIS — C79.31 MALIGNANT MELANOMA METASTATIC TO BRAIN (MULTI): ICD-10-CM

## 2025-08-18 DIAGNOSIS — M79.671 PAIN IN BOTH FEET: ICD-10-CM

## 2025-08-18 PROCEDURE — 70553 MRI BRAIN STEM W/O & W/DYE: CPT

## 2025-08-18 PROCEDURE — A9575 INJ GADOTERATE MEGLUMI 0.1ML: HCPCS | Mod: JW

## 2025-08-18 PROCEDURE — 2550000001 HC RX 255 CONTRASTS: Mod: JW

## 2025-08-18 PROCEDURE — 70553 MRI BRAIN STEM W/O & W/DYE: CPT | Performed by: RADIOLOGY

## 2025-08-18 RX ORDER — GADOTERATE MEGLUMINE 376.9 MG/ML
0.2 INJECTION INTRAVENOUS
Status: COMPLETED | OUTPATIENT
Start: 2025-08-18 | End: 2025-08-18

## 2025-08-18 RX ADMIN — GADOTERATE MEGLUMINE 17 ML: 376.9 INJECTION INTRAVENOUS at 09:34

## 2025-08-19 ENCOUNTER — OFFICE VISIT (OUTPATIENT)
Dept: PLASTIC SURGERY | Facility: CLINIC | Age: 45
End: 2025-08-19
Payer: COMMERCIAL

## 2025-08-19 ENCOUNTER — TELEPHONE (OUTPATIENT)
Dept: HEMATOLOGY/ONCOLOGY | Facility: HOSPITAL | Age: 45
End: 2025-08-19
Payer: COMMERCIAL

## 2025-08-19 VITALS
DIASTOLIC BLOOD PRESSURE: 72 MMHG | RESPIRATION RATE: 18 BRPM | OXYGEN SATURATION: 100 % | SYSTOLIC BLOOD PRESSURE: 139 MMHG | HEART RATE: 81 BPM | BODY MASS INDEX: 33.9 KG/M2 | WEIGHT: 222.88 LBS | TEMPERATURE: 97.8 F

## 2025-08-19 DIAGNOSIS — Z98.890 S/P BREAST RECONSTRUCTION: Primary | ICD-10-CM

## 2025-08-19 DIAGNOSIS — C79.31 MALIGNANT MELANOMA METASTATIC TO BRAIN (MULTI): ICD-10-CM

## 2025-08-19 PROCEDURE — 99211 OFF/OP EST MAY X REQ PHY/QHP: CPT | Performed by: SURGERY

## 2025-08-19 ASSESSMENT — PAIN SCALES - GENERAL: PAINLEVEL_OUTOF10: 0-NO PAIN

## 2025-08-20 ENCOUNTER — TUMOR BOARD CONFERENCE (OUTPATIENT)
Dept: HEMATOLOGY/ONCOLOGY | Facility: HOSPITAL | Age: 45
End: 2025-08-20
Payer: COMMERCIAL

## 2025-08-20 DIAGNOSIS — C79.31 MALIGNANT MELANOMA METASTATIC TO BRAIN (MULTI): Primary | ICD-10-CM

## 2025-08-21 ENCOUNTER — OFFICE VISIT (OUTPATIENT)
Dept: HEMATOLOGY/ONCOLOGY | Facility: HOSPITAL | Age: 45
End: 2025-08-21
Payer: COMMERCIAL

## 2025-08-21 VITALS
RESPIRATION RATE: 18 BRPM | TEMPERATURE: 97.5 F | HEART RATE: 58 BPM | DIASTOLIC BLOOD PRESSURE: 80 MMHG | OXYGEN SATURATION: 100 % | SYSTOLIC BLOOD PRESSURE: 122 MMHG

## 2025-08-21 DIAGNOSIS — C79.31 MALIGNANT MELANOMA METASTATIC TO BRAIN (MULTI): ICD-10-CM

## 2025-08-21 PROCEDURE — 99215 OFFICE O/P EST HI 40 MIN: CPT | Performed by: PSYCHIATRY & NEUROLOGY

## 2025-08-21 ASSESSMENT — PAIN SCALES - GENERAL: PAINLEVEL_OUTOF10: 0-NO PAIN

## 2025-08-25 ENCOUNTER — OFFICE VISIT (OUTPATIENT)
Facility: CLINIC | Age: 45
End: 2025-08-25
Payer: COMMERCIAL

## 2025-08-25 ENCOUNTER — HOSPITAL ENCOUNTER (OUTPATIENT)
Dept: RADIOLOGY | Facility: CLINIC | Age: 45
Discharge: HOME | End: 2025-08-25
Payer: COMMERCIAL

## 2025-08-25 ENCOUNTER — HOSPITAL ENCOUNTER (OUTPATIENT)
Dept: RADIOLOGY | Facility: HOSPITAL | Age: 45
Discharge: HOME | End: 2025-08-25
Payer: COMMERCIAL

## 2025-08-25 DIAGNOSIS — M79.671 BILATERAL FOOT PAIN: ICD-10-CM

## 2025-08-25 DIAGNOSIS — M79.672 PAIN IN BOTH FEET: Primary | ICD-10-CM

## 2025-08-25 DIAGNOSIS — M76.821 POSTERIOR TIBIAL TENDON DYSFUNCTION (PTTD) OF RIGHT LOWER EXTREMITY: ICD-10-CM

## 2025-08-25 DIAGNOSIS — M79.672 BILATERAL FOOT PAIN: ICD-10-CM

## 2025-08-25 DIAGNOSIS — M76.822 POSTERIOR TIBIALIS TENDINITIS OF BOTH LOWER EXTREMITIES: ICD-10-CM

## 2025-08-25 DIAGNOSIS — M79.671 PAIN IN BOTH FEET: Primary | ICD-10-CM

## 2025-08-25 DIAGNOSIS — M76.821 POSTERIOR TIBIALIS TENDINITIS OF BOTH LOWER EXTREMITIES: ICD-10-CM

## 2025-08-25 PROCEDURE — 73630 X-RAY EXAM OF FOOT: CPT | Mod: 50

## 2025-08-25 PROCEDURE — 73630 X-RAY EXAM OF FOOT: CPT | Mod: BILATERAL PROCEDURE | Performed by: RADIOLOGY

## 2025-08-25 PROCEDURE — 99203 OFFICE O/P NEW LOW 30 MIN: CPT | Performed by: PODIATRIST

## 2025-08-25 RX ORDER — CYCLOBENZAPRINE HCL 10 MG
10 TABLET ORAL 2 TIMES DAILY PRN
Qty: 30 TABLET | Refills: 0 | Status: SHIPPED | OUTPATIENT
Start: 2025-08-25 | End: 2025-09-09

## 2025-08-26 ENCOUNTER — APPOINTMENT (OUTPATIENT)
Dept: PLASTIC SURGERY | Facility: CLINIC | Age: 45
End: 2025-08-26
Payer: COMMERCIAL

## 2025-08-28 ENCOUNTER — HOSPITAL ENCOUNTER (OUTPATIENT)
Dept: RADIOLOGY | Facility: HOSPITAL | Age: 45
Discharge: HOME | End: 2025-08-28
Payer: COMMERCIAL

## 2025-08-28 DIAGNOSIS — C79.31 MALIGNANT MELANOMA METASTATIC TO BRAIN (MULTI): ICD-10-CM

## 2025-08-28 PROCEDURE — 3430000001 HC RX 343 DIAGNOSTIC RADIOPHARMACEUTICALS: Performed by: PSYCHIATRY & NEUROLOGY

## 2025-08-28 PROCEDURE — A9597 PET, DX, FOR TUMOR ID, NOC: HCPCS | Performed by: PSYCHIATRY & NEUROLOGY

## 2025-08-28 PROCEDURE — 78815 PET IMAGE W/CT SKULL-THIGH: CPT | Mod: PS

## 2025-08-28 RX ADMIN — Medication 5.8 MILLICURIE: at 12:26

## 2025-09-05 ENCOUNTER — TELEMEDICINE (OUTPATIENT)
Dept: HEMATOLOGY/ONCOLOGY | Facility: HOSPITAL | Age: 45
End: 2025-09-05
Payer: COMMERCIAL

## 2025-09-05 DIAGNOSIS — C79.31 MALIGNANT MELANOMA METASTATIC TO BRAIN (MULTI): ICD-10-CM

## 2025-09-05 PROCEDURE — 1036F TOBACCO NON-USER: CPT | Performed by: PSYCHIATRY & NEUROLOGY

## 2025-09-05 PROCEDURE — 99214 OFFICE O/P EST MOD 30 MIN: CPT | Performed by: PSYCHIATRY & NEUROLOGY

## 2025-09-10 ENCOUNTER — APPOINTMENT (OUTPATIENT)
Dept: HEMATOLOGY/ONCOLOGY | Facility: HOSPITAL | Age: 45
End: 2025-09-10
Payer: COMMERCIAL

## 2025-09-12 ENCOUNTER — APPOINTMENT (OUTPATIENT)
Dept: INTEGRATIVE MEDICINE | Facility: CLINIC | Age: 45
End: 2025-09-12

## 2025-09-18 ENCOUNTER — APPOINTMENT (OUTPATIENT)
Dept: PLASTIC SURGERY | Facility: CLINIC | Age: 45
End: 2025-09-18
Payer: COMMERCIAL

## 2025-10-17 ENCOUNTER — APPOINTMENT (OUTPATIENT)
Dept: INTEGRATIVE MEDICINE | Facility: CLINIC | Age: 45
End: 2025-10-17
Payer: COMMERCIAL

## 2025-11-12 ENCOUNTER — APPOINTMENT (OUTPATIENT)
Dept: INTEGRATIVE MEDICINE | Facility: CLINIC | Age: 45
End: 2025-11-12
Payer: COMMERCIAL

## (undated) DEVICE — POSITIONING KIT, NUBLUE TRENDELENBURG, W/ARM PROTECTOR DRAWSHEET HEADREST, SINGLE USE

## (undated) DEVICE — COVER, MAYO STAND, W/PAD, 23 IN, DISPOSABLE, PLASTIC, LF, STERILE

## (undated) DEVICE — TOWEL, SURGICAL, NEURO, O/R, 16 X 26, BLUE, STERILE

## (undated) DEVICE — Device

## (undated) DEVICE — EVACUATOR, WOUND, SUCTION, CLOSED, JACKSON-PRATT, 100 CC, SILICONE

## (undated) DEVICE — ELECTRODE, ELECTROSURGICAL, BLADE EXT 4 INCH, INSULATED

## (undated) DEVICE — GLOVE, SURGICAL, BIOGEL PI ULTRA TOUCH SYN PF LF SZ 6.5

## (undated) DEVICE — DRAPE, SHEET, THREE QUARTER, FAN FOLD, 57 X 77 IN

## (undated) DEVICE — DRAPE, INCISE, ANTIMICROBIAL, IOBAN 2, LARGE, 17 X 23 IN, DISPOSABLE, STERILE

## (undated) DEVICE — STAPLER, SKIN PROXIMATE, 35 WIDE

## (undated) DEVICE — GLOVE, SURGICAL, BIOGEL PI MICRO INDICATOR UNDERGLOVE PF LF SZ 7 BLUE

## (undated) DEVICE — SUTURE, VICRYL PLUS 3-0, SH, 27IN

## (undated) DEVICE — DRESSING, GAUZE, SPONGE, 12 PLY, CURITY, 4 X 4 IN, STERILE

## (undated) DEVICE — SPONGE, LAP, XRAY DECT, 18IN X 18IN, W/MASTER DMT, STERILE

## (undated) DEVICE — MARKER, SKIN, DUAL TIP, W/RULER

## (undated) DEVICE — SYRINGE, 50 CC, LUER LOCK

## (undated) DEVICE — NEEDLE, HYPODERMIC, 23 GA X 1.5 IN

## (undated) DEVICE — STRIP, SKIN CLOSURE, STERI STRIP, REINFORCED, 0.5 X 4 IN

## (undated) DEVICE — DRAPE, INSTRUMENT, W/POUCH, STERI DRAPE, 7 X 11 IN, DISPOSABLE, STERILE

## (undated) DEVICE — MARKER, SKIN, DUAL TIP INK W/9 LABEL AND REMOVABLE TIME OUT SLEEVE

## (undated) DEVICE — SUTURE, PROLENE, 5-0, 18 IN, PS3, BLUE

## (undated) DEVICE — BANDAGE, GAUZE, CONFORMING, KERLIX, 6 PLY, 4.5 IN X 4.1 YD

## (undated) DEVICE — SUTURE, ETHILON, 2-0, 18 IN, FS, BLACK, BX/36

## (undated) DEVICE — WOUND SYSTEM, DEBRIDEMENT & CLEANING, O.R DUOPAK

## (undated) DEVICE — ELECTRODE, ELECTROSURGICAL, BLADE, INSULATED, ENT/IMA, STERILE

## (undated) DEVICE — SUTURE, MONOCRYL, 3-0, 27 IN, PS-2, UNDYED

## (undated) DEVICE — DRESSING, GAUZE, PETROLATUM, XEROFORM, 5 X 9 IN, STERILE

## (undated) DEVICE — SUTURE, MONOCRYL, 4-0, 27 IN, PS-2, UNDYED

## (undated) DEVICE — SYRINGE, 10 CC, LUER LOCK

## (undated) DEVICE — POSITIONING KIT, PAGAZZI, PINK PAD XL, W/ ARM AND HEAD REST

## (undated) DEVICE — SUTURE, SILK, 2-0, 30 IN, SH, BLACK

## (undated) DEVICE — RETRACTOR, CORDLESS, ONETRAC LX LIGHTED, 135MM X 30MM

## (undated) DEVICE — SOLUTION, INJECTION, USP, NACL, SODIUM CHLORIDE 0.45%, 1000 ML, BAG

## (undated) DEVICE — SUTURE, VICRYL, 2-0, 36 IN, CT-1, UNDYED

## (undated) DEVICE — DRESSING, ANTIMICROBIAL, W/7 MM CENTER HOLE, W/CHLORHEXIDINE GLUCONATE, BIOPATCH, 1 IN DISK